# Patient Record
Sex: MALE | Race: WHITE | Employment: FULL TIME | URBAN - METROPOLITAN AREA
[De-identification: names, ages, dates, MRNs, and addresses within clinical notes are randomized per-mention and may not be internally consistent; named-entity substitution may affect disease eponyms.]

---

## 2017-11-11 ENCOUNTER — APPOINTMENT (OUTPATIENT)
Dept: CT IMAGING | Facility: CLINIC | Age: 62
DRG: 515 | End: 2017-11-11
Attending: STUDENT IN AN ORGANIZED HEALTH CARE EDUCATION/TRAINING PROGRAM
Payer: COMMERCIAL

## 2017-11-11 ENCOUNTER — HOSPITAL ENCOUNTER (INPATIENT)
Facility: CLINIC | Age: 62
LOS: 11 days | Discharge: HOME | DRG: 515 | End: 2017-11-22
Attending: NEUROLOGICAL SURGERY | Admitting: NEUROLOGICAL SURGERY
Payer: COMMERCIAL

## 2017-11-11 DIAGNOSIS — D49.6 BRAIN TUMOR (H): Primary | ICD-10-CM

## 2017-11-11 DIAGNOSIS — G93.9 BRAIN LESION: ICD-10-CM

## 2017-11-11 DIAGNOSIS — I10 HYPERTENSION, UNSPECIFIED TYPE: ICD-10-CM

## 2017-11-11 LAB
ABO + RH BLD: NORMAL
ABO + RH BLD: NORMAL
ANION GAP SERPL CALCULATED.3IONS-SCNC: 10 MMOL/L (ref 3–14)
APTT PPP: 23 SEC (ref 22–37)
BLD GP AB SCN SERPL QL: NORMAL
BLOOD BANK CMNT PATIENT-IMP: NORMAL
BUN SERPL-MCNC: 57 MG/DL (ref 7–30)
CALCIUM SERPL-MCNC: 7.9 MG/DL (ref 8.5–10.1)
CHLORIDE SERPL-SCNC: 113 MMOL/L (ref 94–109)
CO2 SERPL-SCNC: 17 MMOL/L (ref 20–32)
CREAT SERPL-MCNC: 2.33 MG/DL (ref 0.66–1.25)
ERYTHROCYTE [DISTWIDTH] IN BLOOD BY AUTOMATED COUNT: 12.6 % (ref 10–15)
GFR SERPL CREATININE-BSD FRML MDRD: 29 ML/MIN/1.7M2
GLUCOSE BLDC GLUCOMTR-MCNC: 210 MG/DL (ref 70–99)
GLUCOSE SERPL-MCNC: 234 MG/DL (ref 70–99)
HCT VFR BLD AUTO: 38.4 % (ref 40–53)
HGB BLD-MCNC: 12.8 G/DL (ref 13.3–17.7)
INR PPP: 1.03 (ref 0.86–1.14)
MCH RBC QN AUTO: 30.3 PG (ref 26.5–33)
MCHC RBC AUTO-ENTMCNC: 33.3 G/DL (ref 31.5–36.5)
MCV RBC AUTO: 91 FL (ref 78–100)
MRSA DNA SPEC QL NAA+PROBE: NEGATIVE
PLATELET # BLD AUTO: 245 10E9/L (ref 150–450)
POTASSIUM SERPL-SCNC: 4.4 MMOL/L (ref 3.4–5.3)
RADIOLOGIST FLAGS: ABNORMAL
RBC # BLD AUTO: 4.22 10E12/L (ref 4.4–5.9)
SODIUM SERPL-SCNC: 141 MMOL/L (ref 133–144)
SPECIMEN EXP DATE BLD: NORMAL
SPECIMEN SOURCE: NORMAL
WBC # BLD AUTO: 17.9 10E9/L (ref 4–11)

## 2017-11-11 PROCEDURE — 74176 CT ABD & PELVIS W/O CONTRAST: CPT

## 2017-11-11 PROCEDURE — 25000128 H RX IP 250 OP 636: Performed by: STUDENT IN AN ORGANIZED HEALTH CARE EDUCATION/TRAINING PROGRAM

## 2017-11-11 PROCEDURE — 85610 PROTHROMBIN TIME: CPT | Performed by: STUDENT IN AN ORGANIZED HEALTH CARE EDUCATION/TRAINING PROGRAM

## 2017-11-11 PROCEDURE — 20000004 ZZH R&B ICU UMMC

## 2017-11-11 PROCEDURE — 00000146 ZZHCL STATISTIC GLUCOSE BY METER IP

## 2017-11-11 PROCEDURE — 86900 BLOOD TYPING SEROLOGIC ABO: CPT | Performed by: STUDENT IN AN ORGANIZED HEALTH CARE EDUCATION/TRAINING PROGRAM

## 2017-11-11 PROCEDURE — 25000125 ZZHC RX 250: Performed by: STUDENT IN AN ORGANIZED HEALTH CARE EDUCATION/TRAINING PROGRAM

## 2017-11-11 PROCEDURE — 40000141 ZZH STATISTIC PERIPHERAL IV START W/O US GUIDANCE

## 2017-11-11 PROCEDURE — 87641 MR-STAPH DNA AMP PROBE: CPT | Performed by: INTERNAL MEDICINE

## 2017-11-11 PROCEDURE — 70450 CT HEAD/BRAIN W/O DYE: CPT

## 2017-11-11 PROCEDURE — 86901 BLOOD TYPING SEROLOGIC RH(D): CPT | Performed by: STUDENT IN AN ORGANIZED HEALTH CARE EDUCATION/TRAINING PROGRAM

## 2017-11-11 PROCEDURE — 36415 COLL VENOUS BLD VENIPUNCTURE: CPT | Performed by: STUDENT IN AN ORGANIZED HEALTH CARE EDUCATION/TRAINING PROGRAM

## 2017-11-11 PROCEDURE — 80048 BASIC METABOLIC PNL TOTAL CA: CPT | Performed by: STUDENT IN AN ORGANIZED HEALTH CARE EDUCATION/TRAINING PROGRAM

## 2017-11-11 PROCEDURE — 86850 RBC ANTIBODY SCREEN: CPT | Performed by: STUDENT IN AN ORGANIZED HEALTH CARE EDUCATION/TRAINING PROGRAM

## 2017-11-11 PROCEDURE — 87640 STAPH A DNA AMP PROBE: CPT | Performed by: INTERNAL MEDICINE

## 2017-11-11 PROCEDURE — 85027 COMPLETE CBC AUTOMATED: CPT | Performed by: STUDENT IN AN ORGANIZED HEALTH CARE EDUCATION/TRAINING PROGRAM

## 2017-11-11 PROCEDURE — 85730 THROMBOPLASTIN TIME PARTIAL: CPT | Performed by: STUDENT IN AN ORGANIZED HEALTH CARE EDUCATION/TRAINING PROGRAM

## 2017-11-11 RX ORDER — HYDRALAZINE HYDROCHLORIDE 20 MG/ML
5-10 INJECTION INTRAMUSCULAR; INTRAVENOUS EVERY 30 MIN PRN
Status: DISCONTINUED | OUTPATIENT
Start: 2017-11-11 | End: 2017-11-12

## 2017-11-11 RX ORDER — LABETALOL HYDROCHLORIDE 5 MG/ML
10-20 INJECTION, SOLUTION INTRAVENOUS EVERY 30 MIN PRN
Status: DISCONTINUED | OUTPATIENT
Start: 2017-11-11 | End: 2017-11-13

## 2017-11-11 RX ORDER — SODIUM CHLORIDE 9 MG/ML
INJECTION, SOLUTION INTRAVENOUS CONTINUOUS
Status: DISCONTINUED | OUTPATIENT
Start: 2017-11-11 | End: 2017-11-13

## 2017-11-11 RX ORDER — LIDOCAINE 40 MG/G
CREAM TOPICAL
Status: DISCONTINUED | OUTPATIENT
Start: 2017-11-11 | End: 2017-11-22 | Stop reason: HOSPADM

## 2017-11-11 RX ADMIN — Medication 10 MG/HR: at 16:01

## 2017-11-11 RX ADMIN — Medication 14 MG/HR: at 20:08

## 2017-11-11 RX ADMIN — HYDRALAZINE HYDROCHLORIDE 10 MG: 20 INJECTION INTRAMUSCULAR; INTRAVENOUS at 23:43

## 2017-11-11 RX ADMIN — SODIUM CHLORIDE: 9 INJECTION, SOLUTION INTRAVENOUS at 13:24

## 2017-11-11 RX ADMIN — Medication 15 MG/HR: at 23:32

## 2017-11-11 RX ADMIN — NICARDIPINE HYDROCHLORIDE 2.5 MG/HR: 25 INJECTION INTRAVENOUS at 11:44

## 2017-11-11 ASSESSMENT — ACTIVITIES OF DAILY LIVING (ADL)
FALL_HISTORY_WITHIN_LAST_SIX_MONTHS: YES
RETIRED_EATING: 0-->INDEPENDENT
AMBULATION: 0-->INDEPENDENT
COGNITION: 0 - NO COGNITION ISSUES REPORTED
DRESS: 0-->INDEPENDENT
SWALLOWING: 0-->SWALLOWS FOODS/LIQUIDS WITHOUT DIFFICULTY
TRANSFERRING: 0-->INDEPENDENT
BATHING: 0-->INDEPENDENT
RETIRED_COMMUNICATION: 0-->UNDERSTANDS/COMMUNICATES WITHOUT DIFFICULTY
NUMBER_OF_TIMES_PATIENT_HAS_FALLEN_WITHIN_LAST_SIX_MONTHS: 1
TOILETING: 0-->INDEPENDENT

## 2017-11-11 ASSESSMENT — VISUAL ACUITY
OU: NORMAL ACUITY

## 2017-11-11 NOTE — IP AVS SNAPSHOT
[address] [address]    Phone:  [phone]                                       After Visit Summary   11/11/2017    Boy Aguirre    MRN: [id number]           After Visit Summary Signature Page     I have received my discharge instructions, and my questions have been answered. I have discussed any challenges I see with this plan with the nurse or doctor.    ..........................................................................................................................................  Patient/Patient Representative Signature      ..........................................................................................................................................  Patient Representative Print Name and Relationship to Patient    ..................................................               ................................................  Date                                            Time    ..........................................................................................................................................  Reviewed by Signature/Title    ...................................................              ..............................................  Date                                                            Time

## 2017-11-11 NOTE — IP AVS SNAPSHOT
MRN:[id number]                      After Visit Summary   11/11/2017    Boy Aguirre    MRN: [id number]           Thank you!     Thank you for choosing [facility] for your care. Our goal is always to provide you with excellent care. Hearing back from our patients is one way we can continue to improve our services. Please take a few minutes to complete the written survey that you may receive in the mail after you visit with us. Thank you!        Patient Information     Date Of Birth          1955        Designated Caregiver       Most Recent Value    Caregiver    Will someone help with your care after discharge? yes    Name of designated caregiver Sheri (daughter)    Phone number of caregiver [phone]    Caregiver address [address]  [place] [id number]      About your hospital stay     You were admitted on:  November 11, 2017 You last received care in the:  [address] [facility]    You were discharged on:  November 22, 2017        Reason for your hospital stay       S/P right frontal brain biopsy                  Who to Call     For medical emergencies, please call 911.  For non-urgent questions about your medical care, please call your primary care provider or clinic, None  For questions related to your surgery, please call your surgery clinic        Attending Provider     Provider Specialty    Dami Lafleur MD Neurosurgery    [facility], Alexandr Sims MD Neurosurgery       Primary Care Provider Fax #    Provider Not In System [phone]      After Care Instructions     Activity       Your activity upon discharge: {  Do not do any bending, twisting, strenuous exercise, or heavy lifting (greater than 10 pounds) for 4-6 weeks. Be careful and ask for assistance when walking or going up and down stairs. Avoid any activities that could result in trauma to the surgical wound. Do not drive within 3 months of having your last seizure or while using narcotics or other sedating  medications, such as sleep aids, muscle relaxants, etc.            Diet       Follow this diet upon discharge: Orders Placed This Encounter      Regular Diet Adult            Discharge Instructions       You underwent surgery to have a brain tumor removed by Dami Lafleur MD   - If you have not received the results of the pathology (diagnosis) at the time of discharge, our office will call you with these results as soon as they become available, or we will discuss them with you during your clinic visit, whichever is first.     - If you are on a steroid medication (decadron or dexamethasone) please follow the detailed instructions with the prescription to slowly decrease the amount you are taking.         - You will have follow up scheduled with the physician assistants and/or nurse practitioners in our clinic 2 weeks after your surgery.  If you live far away, you may see your primary care doctor for a wound check at 2 weeks.     - If you have not heard from our clinic about your follow up visit by 3-4 days following your discharge, please call our clinic at [phone] to schedule an appointment with the Neurosurgery teams.     After discharge, your activity restrictions are:   -We encourage short frequent walks, increasing as tolerated.  - No driving until you are seen in clinic and cleared by your neurosurgeon.  If you have had a seizure, you may not drive for at least 3 months according to [place] law.    - No strenuous activity.  - No lifting more than 10 pounds until you are seen in clinic (a gallon of milk weighs approximately 8 pounds)    Wound cares after surgery  - You are ok to shower, but do not soak your incisions. Pat them dry if they get damp.   - Avoid coloring your hair or permanent styling until cleared by your surgeon  - No baths, hot tubs or pools for 4-6 weeks after surgery.   - No strenuous activity.  - No lifting more than 10 pounds until you are seen in clinic (a gallon of milk weighs  approximately 8 pounds)  - You are ok to shower, but do not soak your incisions. Pat them dry if they get damp.   - Avoid coloring your hair or permanent styling until cleared by your surgeon  - No baths, hot tubs or pools for 4-6 weeks after surgery.       Call if you have any of the followin. Temperature greater than 101.5 F.   2. Any redness, swelling or discharge from the wound.   3. Any new weakness, numbness or altered mental status.  4. Worsening pain that is not improving with the pain medications you were prescribed.     Call [phone] or after 5:00 pm or on weekends call [phone] and ask for the neurosurgery resident on call. Thank You.            Wound care and dressings       Instructions to care for your wound at home:   You should remove your dressings and bandages on post-operative day #2. You should then keep the wound undressed and open to air. You are allowed to take showers and get the wound wet starting on post-operative day #3 but you may not scrub or soak the wound or keep it submerged under water. If you do happen to get the wound wet, be sure to pat dry it rather than scrubbing it with a towel.                  Follow-up Appointments     Adult [facility] Follow-up and recommended labs and tests       Follow up with Dr Lafleur in Neurosurgery clinic in one month with MRI brain w/wo contrast    Follow up with Dr Jon of Neurology Stroke in one month    Follow up with your primary care provider within 7 days for blood pressure recheck and hospital follow up.  You will need labs drawn at that time as well in order to evaluate your renal function.     Follow up with Primary care provider in 2 weeks for wound check and suture removal    Appointments on [place] and/or [facility] (with [facility] provider or service). Call [phone] if you haven't heard regarding these appointments within 7 days of discharge.                  Additional Services     Neurology Adult Referral     "   Please schedule patient with Dr Jon in Neurology stroke, please )attempt to coordinate with visit with Dr Lafleur in one month (approx 12//                  Future tests that were ordered for you     MRI Brain w & w/o contrast                 Pending Results     Date and Time Order Name Status Description    11/21/2017 0607 Paraneoplastic antibody In process     11/19/2017 0917 Surgical pathology exam In process     11/15/2017 0005 US Renal Complete w Duplex Complete Preliminary     11/13/2017 1215 Anaerobic CSF culture Preliminary     11/13/2017 1119 Freeze Sample CSF Tube 3: Laboratory Miscellaneous Order In process     11/13/2017 1047 Anti-MOG (send to [facility]): Laboratory Miscellaneous Order In process             Statement of Approval     Ordered          11/22/17 0626  I have reviewed and agree with all the recommendations and orders detailed in this document.  EFFECTIVE NOW     Approved and electronically signed by:  Kym Rosen APRN CNP             Admission Information     Date & Time Provider Department Dept. Phone    11/11/2017 Alexandr Hauser MD [address] [facility] [place] [phone]      Your Vitals Were     Blood Pressure Pulse Temperature Respirations Height Weight    136/78 (BP Location: Left arm) 65 97.9  F (36.6  C) (Oral) 16 1.765 m (5' 9.5\") 79 kg (174 lb 2.6 oz)    Pulse Oximetry BMI (Body Mass Index)                96% 25.35 kg/m2          [employer] Information     [employer] lets you send messages to your doctor, view your test results, renew your prescriptions, schedule appointments and more. To sign up, go to [email or web address] . Click on \"Log in\" on the left side of the screen, which will take you to the Welcome page. Then click on \"Sign up Now\" on the right side of the page.     You will be asked to enter the access code listed below, as well as some personal information. Please follow the directions to create your username and password.     Your access code is: " [email or web address]  Expires: 2/15/2018  4:51 PM     Your access code will  in 90 days. If you need help or a new code, please call your [facility] clinic or [phone].        Care EveryWhere ID     This is your Care EveryWhere ID. This could be used by other organizations to access your [facility] medical records  [id number]        Equal Access to Services     [facility] GRACIE : Hadii saeed ku hadasho Soomaali, waaxda luqadaha, qaybta kaalmada adeegyada, waxviktoriya reyes haylyricn parminder estrellagillian rivera . So [facility] [phone].    ATENCIÓN: Si habla espdave, tiene a herman disposición servicios gratuitos de asistencia lingüística. Bertram al [phone].    We comply with applicable federal civil rights laws and [place] laws. We do not discriminate on the basis of race, color, national origin, age, disability, sex, sexual orientation, or gender identity.               Review of your medicines      START taking        Dose / Directions    aMILoride 5 MG tablet   Commonly known as:  MIDAMOR   Used for:  Hypertension, unspecified type        Dose:  10 mg   Take 2 tablets (10 mg) by mouth daily   Quantity:  30 tablet   Refills:  1       carvedilol 25 MG tablet   Commonly known as:  COREG   Used for:  Hypertension, unspecified type        Dose:  25 mg   Take 1 tablet (25 mg) by mouth 2 times daily (with meals)   Quantity:  60 tablet   Refills:  1       NIFEdipine ER 60 MG 24 hr tablet   Commonly known as:  ADALAT CC   Used for:  Hypertension, unspecified type        Dose:  60 mg   Take 1 tablet (60 mg) by mouth 2 times daily   Quantity:  60 tablet   Refills:  1       oxyCODONE IR 5 MG tablet   Commonly known as:  ROXICODONE        Dose:  5 mg   Take 1 tablet (5 mg) by mouth every 4 hours as needed for moderate to severe pain   Quantity:  30 tablet   Refills:  0       senna-docusate 8.6-50 MG per tablet   Commonly known as:  SENOKOT-S;PERICOLACE        Dose:  3 tablet   3 tablets by Oral or Feeding Tube route 2 times daily   Quantity:  30  tablet   Refills:  0            Where to get your medicines      These medications were sent to [facility] [place] - [address]  [address] [place] [address]     Phone:  [phone]     aMILoride 5 MG tablet    carvedilol 25 MG tablet    NIFEdipine ER 60 MG 24 hr tablet    senna-docusate 8.6-50 MG per tablet         Some of these will need a paper prescription and others can be bought over the counter. Ask your nurse if you have questions.     Bring a paper prescription for each of these medications     oxyCODONE IR 5 MG tablet                Protect others around you: Learn how to safely use, store and throw away your medicines at www.disposemymeds.org.             Medication List: This is a list of all your medications and when to take them. Check marks below indicate your daily home schedule. Keep this list as a reference.      Medications           Morning Afternoon Evening Bedtime As Needed    aMILoride 5 MG tablet   Commonly known as:  MIDAMOR   Take 2 tablets (10 mg) by mouth daily   Last time this was given:  10 mg on 11/21/2017  8:51 AM                                carvedilol 25 MG tablet   Commonly known as:  COREG   Take 1 tablet (25 mg) by mouth 2 times daily (with meals)   Last time this was given:  25 mg on 11/21/2017  6:27 PM                                NIFEdipine ER 60 MG 24 hr tablet   Commonly known as:  ADALAT CC   Take 1 tablet (60 mg) by mouth 2 times daily   Last time this was given:  60 mg on 11/21/2017  8:47 PM                                oxyCODONE IR 5 MG tablet   Commonly known as:  ROXICODONE   Take 1 tablet (5 mg) by mouth every 4 hours as needed for moderate to severe pain                                senna-docusate 8.6-50 MG per tablet   Commonly known as:  SENOKOT-S;PERICOLACE   3 tablets by Oral or Feeding Tube route 2 times daily   Last time this was given:  3 tablets on 11/16/2017  7:43 AM

## 2017-11-11 NOTE — PROGRESS NOTES
"SPIRITUAL HEALTH SERVICES  SPIRITUAL ASSESSMENT Progress Note  [facility]A     REFERRAL SOURCE: Hospital  Request    Met with Boy to offer spiritual and emotional support. Boy said \"i'm very tired right now maybe tomorrow.\"     PLAN: I will make the on call  for 11/12 aware of this request     Margo Chaudhary  Chaplain Resident  Pager [phone]    "

## 2017-11-12 LAB
ALBUMIN UR-MCNC: NEGATIVE MG/DL
ANION GAP SERPL CALCULATED.3IONS-SCNC: 11 MMOL/L (ref 3–14)
ANION GAP SERPL CALCULATED.3IONS-SCNC: 13 MMOL/L (ref 3–14)
APPEARANCE UR: CLEAR
BILIRUB UR QL STRIP: NEGATIVE
BUN SERPL-MCNC: 73 MG/DL (ref 7–30)
BUN SERPL-MCNC: 73 MG/DL (ref 7–30)
CALCIUM SERPL-MCNC: 8.1 MG/DL (ref 8.5–10.1)
CALCIUM SERPL-MCNC: 8.1 MG/DL (ref 8.5–10.1)
CHLORIDE SERPL-SCNC: 111 MMOL/L (ref 94–109)
CHLORIDE SERPL-SCNC: 112 MMOL/L (ref 94–109)
CO2 SERPL-SCNC: 13 MMOL/L (ref 20–32)
CO2 SERPL-SCNC: 16 MMOL/L (ref 20–32)
COLOR UR AUTO: ABNORMAL
CREAT SERPL-MCNC: 2.39 MG/DL (ref 0.66–1.25)
CREAT SERPL-MCNC: 2.54 MG/DL (ref 0.66–1.25)
CREAT UR-MCNC: 90 MG/DL
CRP SERPL-MCNC: <2.9 MG/L (ref 0–8)
ERYTHROCYTE [DISTWIDTH] IN BLOOD BY AUTOMATED COUNT: 12.9 % (ref 10–15)
ERYTHROCYTE [SEDIMENTATION RATE] IN BLOOD BY WESTERGREN METHOD: 15 MM/H (ref 0–20)
FRACT EXCRET NA UR+SERPL-RTO: 0.8 %
GFR SERPL CREATININE-BSD FRML MDRD: 26 ML/MIN/1.7M2
GFR SERPL CREATININE-BSD FRML MDRD: 28 ML/MIN/1.7M2
GLUCOSE BLDC GLUCOMTR-MCNC: 189 MG/DL (ref 70–99)
GLUCOSE SERPL-MCNC: 213 MG/DL (ref 70–99)
GLUCOSE SERPL-MCNC: 217 MG/DL (ref 70–99)
GLUCOSE UR STRIP-MCNC: 300 MG/DL
HCT VFR BLD AUTO: 37.1 % (ref 40–53)
HGB BLD-MCNC: 11.8 G/DL (ref 13.3–17.7)
HGB UR QL STRIP: NEGATIVE
KETONES UR STRIP-MCNC: NEGATIVE MG/DL
LACTATE BLD-SCNC: 2 MMOL/L (ref 0.7–2)
LEUKOCYTE ESTERASE UR QL STRIP: NEGATIVE
MCH RBC QN AUTO: 29.8 PG (ref 26.5–33)
MCHC RBC AUTO-ENTMCNC: 31.8 G/DL (ref 31.5–36.5)
MCV RBC AUTO: 94 FL (ref 78–100)
MUCOUS THREADS #/AREA URNS LPF: PRESENT /LPF
NITRATE UR QL: NEGATIVE
PH UR STRIP: 5 PH (ref 5–7)
PLATELET # BLD AUTO: 225 10E9/L (ref 150–450)
POTASSIUM SERPL-SCNC: 4.2 MMOL/L (ref 3.4–5.3)
POTASSIUM SERPL-SCNC: 4.4 MMOL/L (ref 3.4–5.3)
PROCALCITONIN SERPL-MCNC: 0.1 NG/ML
RBC # BLD AUTO: 3.96 10E12/L (ref 4.4–5.9)
RBC #/AREA URNS AUTO: 0 /HPF (ref 0–2)
SODIUM SERPL-SCNC: 138 MMOL/L (ref 133–144)
SODIUM SERPL-SCNC: 138 MMOL/L (ref 133–144)
SODIUM UR-SCNC: 37 MMOL/L
SOURCE: ABNORMAL
SP GR UR STRIP: 1.01 (ref 1–1.03)
UROBILINOGEN UR STRIP-MCNC: NORMAL MG/DL (ref 0–2)
WBC # BLD AUTO: 15.3 10E9/L (ref 4–11)
WBC #/AREA URNS AUTO: 1 /HPF (ref 0–2)

## 2017-11-12 PROCEDURE — 80048 BASIC METABOLIC PNL TOTAL CA: CPT | Performed by: STUDENT IN AN ORGANIZED HEALTH CARE EDUCATION/TRAINING PROGRAM

## 2017-11-12 PROCEDURE — 85027 COMPLETE CBC AUTOMATED: CPT | Performed by: STUDENT IN AN ORGANIZED HEALTH CARE EDUCATION/TRAINING PROGRAM

## 2017-11-12 PROCEDURE — 12000008 ZZH R&B INTERMEDIATE UMMC

## 2017-11-12 PROCEDURE — 81001 URINALYSIS AUTO W/SCOPE: CPT | Performed by: STUDENT IN AN ORGANIZED HEALTH CARE EDUCATION/TRAINING PROGRAM

## 2017-11-12 PROCEDURE — 86140 C-REACTIVE PROTEIN: CPT | Performed by: STUDENT IN AN ORGANIZED HEALTH CARE EDUCATION/TRAINING PROGRAM

## 2017-11-12 PROCEDURE — 25000128 H RX IP 250 OP 636: Performed by: STUDENT IN AN ORGANIZED HEALTH CARE EDUCATION/TRAINING PROGRAM

## 2017-11-12 PROCEDURE — 25000132 ZZH RX MED GY IP 250 OP 250 PS 637: Performed by: STUDENT IN AN ORGANIZED HEALTH CARE EDUCATION/TRAINING PROGRAM

## 2017-11-12 PROCEDURE — 84300 ASSAY OF URINE SODIUM: CPT | Performed by: STUDENT IN AN ORGANIZED HEALTH CARE EDUCATION/TRAINING PROGRAM

## 2017-11-12 PROCEDURE — 36415 COLL VENOUS BLD VENIPUNCTURE: CPT | Performed by: NEUROLOGICAL SURGERY

## 2017-11-12 PROCEDURE — 83605 ASSAY OF LACTIC ACID: CPT | Performed by: NEUROLOGICAL SURGERY

## 2017-11-12 PROCEDURE — 85652 RBC SED RATE AUTOMATED: CPT | Performed by: STUDENT IN AN ORGANIZED HEALTH CARE EDUCATION/TRAINING PROGRAM

## 2017-11-12 PROCEDURE — 36415 COLL VENOUS BLD VENIPUNCTURE: CPT | Performed by: STUDENT IN AN ORGANIZED HEALTH CARE EDUCATION/TRAINING PROGRAM

## 2017-11-12 PROCEDURE — 84145 PROCALCITONIN (PCT): CPT | Performed by: STUDENT IN AN ORGANIZED HEALTH CARE EDUCATION/TRAINING PROGRAM

## 2017-11-12 PROCEDURE — 83036 HEMOGLOBIN GLYCOSYLATED A1C: CPT | Performed by: STUDENT IN AN ORGANIZED HEALTH CARE EDUCATION/TRAINING PROGRAM

## 2017-11-12 PROCEDURE — 25000125 ZZHC RX 250: Performed by: STUDENT IN AN ORGANIZED HEALTH CARE EDUCATION/TRAINING PROGRAM

## 2017-11-12 PROCEDURE — 00000146 ZZHCL STATISTIC GLUCOSE BY METER IP

## 2017-11-12 PROCEDURE — 82570 ASSAY OF URINE CREATININE: CPT | Performed by: STUDENT IN AN ORGANIZED HEALTH CARE EDUCATION/TRAINING PROGRAM

## 2017-11-12 RX ORDER — AMOXICILLIN 250 MG
1 CAPSULE ORAL AT BEDTIME
Status: DISCONTINUED | OUTPATIENT
Start: 2017-11-12 | End: 2017-11-13

## 2017-11-12 RX ADMIN — SODIUM CHLORIDE 500 ML: 9 INJECTION, SOLUTION INTRAVENOUS at 12:56

## 2017-11-12 RX ADMIN — Medication 15 MG/HR: at 01:59

## 2017-11-12 RX ADMIN — Medication 10 MG: at 00:56

## 2017-11-12 RX ADMIN — Medication 20 MG: at 04:38

## 2017-11-12 RX ADMIN — SODIUM CHLORIDE: 9 INJECTION, SOLUTION INTRAVENOUS at 14:40

## 2017-11-12 RX ADMIN — Medication 15 MG/HR: at 03:54

## 2017-11-12 RX ADMIN — Medication 15 MG/HR: at 06:45

## 2017-11-12 RX ADMIN — SODIUM CHLORIDE: 9 INJECTION, SOLUTION INTRAVENOUS at 00:46

## 2017-11-12 RX ADMIN — SENNOSIDES AND DOCUSATE SODIUM 1 TABLET: 8.6; 5 TABLET ORAL at 22:33

## 2017-11-12 RX ADMIN — Medication 20 MG: at 02:05

## 2017-11-12 RX ADMIN — Medication 20 MG: at 03:50

## 2017-11-12 ASSESSMENT — VISUAL ACUITY
OU: NORMAL ACUITY

## 2017-11-12 NOTE — PROGRESS NOTES
[facility]  NeuroICU Daily ICU Note:          Assessment   Boy Aguirre is a 62 year old male who presented with Lt sided weakness LL>UL on 11/6 with headache, MRI Brain shows Rt sided Mass enhancing lesion with Lt hemispheric 2 small enhancing lesions. Went to outside hospital, has worsened headache & vomiting with new suspected bleed on CT head that is why he was transferred to us.          Subjective (24 hours events)     No acute events overnight.           Plan 1.   Neuro:    Continue frequent neuro exams while in ICU. Notify MD for acute changes in exam.    Pain control    Do CT chest abdomen & pelvis to exclude any primary mass    Repeat MRI head w/wo contrust  2. CVS: hemodynamically stable    Maintain SBP < 140    Continue nicardine drip    Hydralazine and labetolol PRN    Continuous cardiac monitoring while in ICU  3. Pulmonary: no issues    Continuous pulse oximetry    Supplemental oxygen PRN    Incentive spirometry Q1H while awake    Daily chest X ray as needed   4. GI:     Advance diet as tolerated to regular    Bowel regimen. PRN anti-emetics.    Protonix for ulcer ppx  5. Renal: CKD    mIVF -- TKO when patient is tolerating good PO intake    Electrolyte replacement protocol    Continue to monitor intake/output    Daily BMP  6. ID: afebrile, normal WBC count    Continue to monitor for fevers and/or signs of infection  7. Endocrine:    Continue glucose checks  8. Heme: no issues    Platelets > 100,000    INR < 1.5    Hemoglobin > 7    Daily CBC  9. Prophylaxis    DVT: SCDs while in bed    GI: Protonix  10. Disposition: Surgical ICU    PT/OT    Above patient and plan has been discussed with the neuro ICU Attending.                Objective   Temp:  [98.4  F (36.9  C)-98.8  F (37.1  C)] 98.8  F (37.1  C)  Pulse:  [94] 94  Heart Rate:  [] 93  Resp:  [14-22] 20  BP: (134-161)/() 147/79  SpO2:  [90 %-93 %] 92 %    No Data Recorded    Resp: 20    I/O last 3 completed  shifts:  In: 175 [I.V.:175]  Out: 300 [Urine:300]    Physical Exam  General: NAD, lying comfortably in bed  Incision: clean, dry, dressing intact  Neurologic    Mental Status:       -- Awake; Alert; oriented x 3      -- Follows commands       -- Speech fluent, spontaneous. No aphasia or dysarthria.      -- no gaze preference. No apparent hemineglect.    Cranial Nerves:      -- visual fields full to confrontation, PERRL 3-2mm bilat and brisk      -- extraocular movements intact      -- face symmetrical, tongue midline      -- sensory V1-V3 intact bilaterally      -- palate elevates symmetrically, uvula midline      -- hearing grossly intact bilat    Motor:    Delt Bi Tri WE WF    R 5 5 5 5 5 5   L 4 4 4 4 4 5    IP Quad Ham DF PF EHL   R 5 5 5 5 5 5   L 4 4 4 5 5 5     Sensory: symmetrically intact to light touch x4 extremities.     Reflexes: 2+ bilaterally and symmetric in biceps, triceps, brachioradialis, and patellae; no ankle clonus bilaterally; negative Babinski bilaterally; negative Yo's bilaterally      Labs and Imaging   BMP  Recent Labs  Lab 11/11/17  1313      POTASSIUM 4.4   CHLORIDE 113*   CO2 17*   BUN 57*   CR 2.33*   ALICIA 7.9*       CBC  Recent Labs  Lab 11/11/17  1313   WBC 17.9*   HGB 12.8*          COAGS  Recent Labs  Lab 11/11/17  1313   INR 1.03   PTT 23        ABGNo results for input(s): PH, PCO2, PO2, HCO3 in the last 168 hours.    CRP/ESRNo results for input(s): CRP in the last 168 hours.    Invalid input(s): ESR    CSFNo results for input(s): CGLU, CTP in the last 168 hours.    Invalid input(s): CCSF    MICRONo results for input(s): CULT in the last 168 hours.    IMAGING:   CT head 11/11: Small hyperdense focus in the periphery of the high right  frontal lobe may represent intraparenchymal hemorrhage. Confluent  edema in the high right frontal lobe.    CT chest Abdomen & pelvis 11/11: report is pending

## 2017-11-12 NOTE — H&P
"[facility]    History and Physical    HPI:  Mr. Aguirre is a 62 year old man who presented to OSH complaining of left-sided weakness s/p fall at home on 11/6, with a HA that developed later and elevated SBP in 220s.  Went to OSH with worsening HA & vomiting. Placed on nicardipine at [place], transferred due to repeat CT head concerning for suspected bleed along posterior superior right frontal lobe. Weaker on the left side (LLE>LUE), no other weakness. States this has been worsening the past couple of weeks.     PAST MEDICAL HISTORY: No past medical history on file.    PAST SURGICAL HISTORY: No past surgical history on file.    FAMILY HISTORY: No family history on file.    SOCIAL HISTORY:   Social History   Substance Use Topics     Smoking status: Not on file     Smokeless tobacco: Not on file     Alcohol use Not on file       MEDICATIONS:  No current outpatient prescriptions on file.         ROS: 10 point ROS of systems including Constitutional, Eyes, Respiratory, Cardiovascular, Gastroenterology, Genitourinary, Integumentary, Muscularskeletal, Psychiatric were all negative except for pertinent positives noted in my HPI.    PE:  Blood pressure 121/69, pulse 94, temperature 97.9  F (36.6  C), temperature source Oral, resp. rate 19, height 1.765 m (5' 9.5\"), weight 92.5 kg (203 lb 14.8 oz), SpO2 95 %.  CV RRR, no m/r/g  PULM CTAB, no wheezes or rubs  ABD NT/ND, BS + throughout   NEUROLOGIC    Awake, alert, oriented x3, Following commands, NAD  Cranial Nerves: PERRL b/l, EOMI b/l, facial sensation intact to light touch throught, face symmetric, hearing intact to finger rub bilaterally, tongue protrusion midline, uvula midline, sternocleidomastoid/trapezius strength 5/5 bilaterally  Gross Motor Examination: HUGGINS with 5/5 strength EXCEPT 4/5 LUE weakness (except 5/5 handgrip) and 4/5 LLE (except 5/5 DF/PF/EHL), no pronator drift, no finger-to-nose dysmetria.  Sensory " Examination: intact to light touch throught  Reflexes 2/4, Toe signs were downgoing.       LABS/IMAGING:   Most Recent 3 CBC's:  Recent Labs   Lab Test  11/11/17   1313   WBC  17.9*   HGB  12.8*   MCV  91   PLT  245       Most Recent 3 BMP's:  Recent Labs   Lab Test  11/12/17   0740  11/11/17   1313   NA  138  141   POTASSIUM  4.4  4.4   CHLORIDE  112*  113*   CO2  13*  17*   BUN  73*  57*   CR  2.54*  2.33*   ANIONGAP  13  10   ALCIIA  8.1*  7.9*   GLC  217*  234*       CT Chest Abdomen Pelvis w/o Contrast   Final Result   Impression:    1. Bilateral apical predominant perilymphatic distribution pulmonary   nodules and septal thickening can be seen in lymphangitic   carcinomatosis. Mixed groundglass and nodular opacities in the lung   bases and likely infectious or inflammatory in etiology and follow-up   to resolution is recommended with low-dose chest CT in 3 months.   2. Smooth interlobular septal thickening and scattered groundglass   opacities likely representing pulmonary edema.   3. Small bilateral pleural effusions and adjacent basilar atelectasis   and/or consolidation.   4. Small volume ascites of unclear etiology in this noncontrast   examination although may relate to volume overload given soft tissue   anasarca and pulmonary edema.      I have personally reviewed the examination and initial interpretation   and I agree with the findings.      GEMINI SILVA MD      CT Head w/o Contrast   Final Result   Abnormal   Impression: Small hyperdense focus in the periphery of the high right   frontal lobe may represent intraparenchymal hemorrhage. Confluent   edema in the high right frontal lobe. Comparison with previous imaging   studies would be likely useful, if available.      [Urgent Result: Possible intraparenchymal hemorrhage.]      Finding was identified on 11/11/2017 2:36 PM.       Dr. Fallon was contacted by Dr. Alexandr Caldwell at 11/11/2017 3:03 PM   and verbalized understanding of the urgent finding.        I have personally reviewed the examination and initial interpretation   and I agree with the findings.      DEEPA MCKOY MD      MR Brain w/o & w Contrast    (Results Pending)       ASSESSMENT:  62M with left sided weakness and HA, presenting to OSH with elevated SBP in 220s and imaging demonstrating an area of edema and contrast enhancement in right frontal lobe concerning for mass. CT CAP did not show any definitive masses elsewhere in the body.     PLAN:  - Admission to neurosurgery   - Neuro checks q 2 hours  - MRI brain with and without contrast today   - Hold steroids   - Vitals q 2 hours  - In ICU for nicardipine drip in order to reach SBP goal < 140. Also has labetalol prn available.   - Regular diet   - Activity: as tolerated    Kaye Napoles MD   Neurosurgery PGY3

## 2017-11-12 NOTE — PLAN OF CARE
Problem: Patient Care Overview  Goal: Individualization & Mutuality  Outcome: No Change  D: Boy is alert and oriented x 4, pupils 3 -4 equal, brisk, tracks, no field cuts. HUGGINS strongly, all pulses normal, all warm, with good cap refill, denies numb/tingle. L UE slight drift continued throughout the night. Stood at bedside to void and marched in place. Nicardipine at 15 mg/hour with sBP persistantly >140. Given hydralazine, when Boy stood 40 min later he flushed and felt weak, hydralazine dc'd. Labetolol 10 mg and 20 mg given with SBP responding. LS clear, O2 added at 2 L NC and now increased to 4 L to maintain sats >90. BS +, large amount of flatus now at 0615. Voids spontaneously in good amounts clear yellow urine.   P: MRI scheduled this am, check list sent. Continue neuro checks, maintain safety. Assist Boy as able through this frustrating time.

## 2017-11-12 NOTE — PLAN OF CARE
Problem: Patient Care Overview  Goal: Plan of Care/Patient Progress Review  Pt arrived from 4A approx 1450. Pt identifies as female, pt prefers to be called she/her. Bedside neuro done with 4A nurse, no changes. Pt settled. MIVF at 125ml/hr. BMP to be drawn at 1700. MRI depending on BMP. MRI checklist sent by 4A nurse. Cont with POC.

## 2017-11-12 NOTE — PROGRESS NOTES
"SPIRITUAL HEALTH SERVICES  [facility] ([place]) 4A  ON-CALL VISIT     REFERRAL SOURCE: referred to pt by Saturday on-call  - routine request for  support upon admission.     Pt declined  visit today, said \"I'm eating now, and then I'll be watching the [employer] game\". Pt said he would prefer a visit tomorrow.     PLAN: refer to unit  for visit on Monday.     Anant Clarke) Shree West M.Div., [facility]  Staff   Pager [phone]                                                                                          "

## 2017-11-12 NOTE — PLAN OF CARE
Problem: Patient Care Overview  Goal: Plan of Care/Patient Progress Review  Outcome: Improving  Pt transferred to  from  around 14:30 via w/c accompanied by RN. Reason for transfer: Pt no longer needing ICU care. Nicardipine gtt weaned off and maintaining SBP <180 (new parameter per Dr. Ross)  VSS upon transferring. Pt voiced no c/o pain/nausea. Pt tolerated regular diet. MRI on hold til GFR is increased. MD aware. NS 500cc bolus given and MIV increased to 125cc/hr (NS)   BMP scheduled to be checked at 17:00 per LAB. Report given to 6A nurse. Pt's belongings sent. Pt stated he will contact his family members later of the transfer.

## 2017-11-12 NOTE — PLAN OF CARE
Problem: Patient Care Overview  Goal: Plan of Care/Patient Progress Review  Outcome: No Change  Pt admitted to  from OSH (from [facility]) around 11:30 for ICH per report. Pt was admitted to OSH on 11/7 with H/A, falling at home, and pt called 911. MRI showed vasogenic edema of post Right frontal area, per report. No seizures noted also. On 11/8, pt had increased H/A and Head CT showed new ICH. Pt then went to ICU there and started on Nicardipine gtt. Upon arrival to , pt alert and oriented x 4, on room air, and no c/o pain.  Pt continues on Nicardipine gtt to keep sbp <140. Neuro assessment normal except slight weakness on left arm.  Head/chest/abd/pelvis CT completed. Bedside swallow evaluation completed, and pt passed the swallow evaluation.  MD ordered regular diet. Continue to monitor BP and titrate nicardipine gtt as needed to keep sbp <140. See flow sheet for details.

## 2017-11-13 ENCOUNTER — APPOINTMENT (OUTPATIENT)
Dept: ULTRASOUND IMAGING | Facility: CLINIC | Age: 62
DRG: 515 | End: 2017-11-13
Attending: STUDENT IN AN ORGANIZED HEALTH CARE EDUCATION/TRAINING PROGRAM
Payer: COMMERCIAL

## 2017-11-13 LAB
ALBUMIN SERPL-MCNC: 2.8 G/DL (ref 3.4–5)
ANION GAP SERPL CALCULATED.3IONS-SCNC: 10 MMOL/L (ref 3–14)
APPEARANCE CSF: CLEAR
APPEARANCE CSF: CLEAR
BUN SERPL-MCNC: 59 MG/DL (ref 7–30)
CALCIUM SERPL-MCNC: 7.7 MG/DL (ref 8.5–10.1)
CEA SERPL-MCNC: <0.5 UG/L (ref 0–2.5)
CHLORIDE SERPL-SCNC: 114 MMOL/L (ref 94–109)
CO2 SERPL-SCNC: 18 MMOL/L (ref 20–32)
COLOR CSF: COLORLESS
COLOR CSF: COLORLESS
COPATH REPORT: NORMAL
CREAT SERPL-MCNC: 2.12 MG/DL (ref 0.66–1.25)
GFR SERPL CREATININE-BSD FRML MDRD: 32 ML/MIN/1.7M2
GLUCOSE CSF-MCNC: 93 MG/DL (ref 40–70)
GLUCOSE SERPL-MCNC: 158 MG/DL (ref 70–99)
GRAM STN SPEC: NORMAL
HBA1C MFR BLD: 6.7 % (ref 4.3–6)
HIV 1+2 AB+HIV1 P24 AG SERPL QL IA: NONREACTIVE
LACTATE BLD-SCNC: 2.3 MMOL/L (ref 0.7–2)
LYMPH ABN NFR CSF MANUAL: 37 %
MONOS+MACROS NFR CSF MANUAL: 58 %
NEUTROPHILS NFR CSF MANUAL: 5 %
NT-PROBNP SERPL-MCNC: 5950 PG/ML (ref 0–900)
POTASSIUM SERPL-SCNC: 4.4 MMOL/L (ref 3.4–5.3)
PROT CSF-MCNC: 50 MG/DL (ref 15–60)
PSA SERPL-MCNC: 1 UG/L (ref 0–4)
RBC # CSF MANUAL: 51 /UL (ref 0–2)
RBC # CSF MANUAL: 60 /UL (ref 0–2)
SODIUM SERPL-SCNC: 142 MMOL/L (ref 133–144)
SPECIMEN SOURCE: NORMAL
TUBE # CSF: 1 #
TUBE # CSF: 3 #
WBC # CSF MANUAL: 4 /UL (ref 0–5)
WBC # CSF MANUAL: 7 /UL (ref 0–5)

## 2017-11-13 PROCEDURE — 83916 OLIGOCLONAL BANDS: CPT | Performed by: STUDENT IN AN ORGANIZED HEALTH CARE EDUCATION/TRAINING PROGRAM

## 2017-11-13 PROCEDURE — 87075 CULTR BACTERIA EXCEPT BLOOD: CPT | Performed by: STUDENT IN AN ORGANIZED HEALTH CARE EDUCATION/TRAINING PROGRAM

## 2017-11-13 PROCEDURE — 87070 CULTURE OTHR SPECIMN AEROBIC: CPT | Performed by: STUDENT IN AN ORGANIZED HEALTH CARE EDUCATION/TRAINING PROGRAM

## 2017-11-13 PROCEDURE — 76770 US EXAM ABDO BACK WALL COMP: CPT

## 2017-11-13 PROCEDURE — 009U3ZX DRAINAGE OF SPINAL CANAL, PERCUTANEOUS APPROACH, DIAGNOSTIC: ICD-10-PCS | Performed by: NEUROLOGICAL SURGERY

## 2017-11-13 PROCEDURE — 25000132 ZZH RX MED GY IP 250 OP 250 PS 637: Performed by: NURSE PRACTITIONER

## 2017-11-13 PROCEDURE — 25000128 H RX IP 250 OP 636: Performed by: STUDENT IN AN ORGANIZED HEALTH CARE EDUCATION/TRAINING PROGRAM

## 2017-11-13 PROCEDURE — 80048 BASIC METABOLIC PNL TOTAL CA: CPT | Performed by: STUDENT IN AN ORGANIZED HEALTH CARE EDUCATION/TRAINING PROGRAM

## 2017-11-13 PROCEDURE — 25000132 ZZH RX MED GY IP 250 OP 250 PS 637: Performed by: STUDENT IN AN ORGANIZED HEALTH CARE EDUCATION/TRAINING PROGRAM

## 2017-11-13 PROCEDURE — 82945 GLUCOSE OTHER FLUID: CPT | Performed by: STUDENT IN AN ORGANIZED HEALTH CARE EDUCATION/TRAINING PROGRAM

## 2017-11-13 PROCEDURE — 87205 SMEAR GRAM STAIN: CPT | Performed by: STUDENT IN AN ORGANIZED HEALTH CARE EDUCATION/TRAINING PROGRAM

## 2017-11-13 PROCEDURE — 89050 BODY FLUID CELL COUNT: CPT | Performed by: INTERNAL MEDICINE

## 2017-11-13 PROCEDURE — 87389 HIV-1 AG W/HIV-1&-2 AB AG IA: CPT | Performed by: INTERNAL MEDICINE

## 2017-11-13 PROCEDURE — 82784 ASSAY IGA/IGD/IGG/IGM EACH: CPT | Performed by: STUDENT IN AN ORGANIZED HEALTH CARE EDUCATION/TRAINING PROGRAM

## 2017-11-13 PROCEDURE — 40000556 ZZH STATISTIC PERIPHERAL IV START W US GUIDANCE

## 2017-11-13 PROCEDURE — 99221 1ST HOSP IP/OBS SF/LOW 40: CPT | Mod: GC | Performed by: INTERNAL MEDICINE

## 2017-11-13 PROCEDURE — 84999 UNLISTED CHEMISTRY PROCEDURE: CPT | Performed by: INTERNAL MEDICINE

## 2017-11-13 PROCEDURE — 86255 FLUORESCENT ANTIBODY SCREEN: CPT | Performed by: INTERNAL MEDICINE

## 2017-11-13 PROCEDURE — 84157 ASSAY OF PROTEIN OTHER: CPT | Performed by: STUDENT IN AN ORGANIZED HEALTH CARE EDUCATION/TRAINING PROGRAM

## 2017-11-13 PROCEDURE — 84153 ASSAY OF PSA TOTAL: CPT | Performed by: STUDENT IN AN ORGANIZED HEALTH CARE EDUCATION/TRAINING PROGRAM

## 2017-11-13 PROCEDURE — 00000102 ZZHCL STATISTIC CYTO WRIGHT STAIN TC: Performed by: STUDENT IN AN ORGANIZED HEALTH CARE EDUCATION/TRAINING PROGRAM

## 2017-11-13 PROCEDURE — 82040 ASSAY OF SERUM ALBUMIN: CPT | Performed by: STUDENT IN AN ORGANIZED HEALTH CARE EDUCATION/TRAINING PROGRAM

## 2017-11-13 PROCEDURE — 83880 ASSAY OF NATRIURETIC PEPTIDE: CPT | Performed by: STUDENT IN AN ORGANIZED HEALTH CARE EDUCATION/TRAINING PROGRAM

## 2017-11-13 PROCEDURE — 88108 CYTOPATH CONCENTRATE TECH: CPT | Performed by: STUDENT IN AN ORGANIZED HEALTH CARE EDUCATION/TRAINING PROGRAM

## 2017-11-13 PROCEDURE — 36415 COLL VENOUS BLD VENIPUNCTURE: CPT | Performed by: STUDENT IN AN ORGANIZED HEALTH CARE EDUCATION/TRAINING PROGRAM

## 2017-11-13 PROCEDURE — 25000125 ZZHC RX 250: Performed by: STUDENT IN AN ORGANIZED HEALTH CARE EDUCATION/TRAINING PROGRAM

## 2017-11-13 PROCEDURE — 83036 HEMOGLOBIN GLYCOSYLATED A1C: CPT | Performed by: NEUROLOGICAL SURGERY

## 2017-11-13 PROCEDURE — 89051 BODY FLUID CELL COUNT: CPT | Performed by: STUDENT IN AN ORGANIZED HEALTH CARE EDUCATION/TRAINING PROGRAM

## 2017-11-13 PROCEDURE — 82164 ANGIOTENSIN I ENZYME TEST: CPT | Performed by: INTERNAL MEDICINE

## 2017-11-13 PROCEDURE — 82378 CARCINOEMBRYONIC ANTIGEN: CPT | Performed by: STUDENT IN AN ORGANIZED HEALTH CARE EDUCATION/TRAINING PROGRAM

## 2017-11-13 PROCEDURE — 12000003 ZZH R&B CRITICAL UMMC

## 2017-11-13 PROCEDURE — 83605 ASSAY OF LACTIC ACID: CPT | Performed by: NEUROLOGICAL SURGERY

## 2017-11-13 PROCEDURE — 82042 OTHER SOURCE ALBUMIN QUAN EA: CPT | Performed by: STUDENT IN AN ORGANIZED HEALTH CARE EDUCATION/TRAINING PROGRAM

## 2017-11-13 RX ORDER — POLYETHYLENE GLYCOL 3350 17 G/17G
17 POWDER, FOR SOLUTION ORAL 3 TIMES DAILY
Status: DISCONTINUED | OUTPATIENT
Start: 2017-11-13 | End: 2017-11-22 | Stop reason: HOSPADM

## 2017-11-13 RX ORDER — LABETALOL HYDROCHLORIDE 5 MG/ML
20 INJECTION, SOLUTION INTRAVENOUS
Status: DISCONTINUED | OUTPATIENT
Start: 2017-11-13 | End: 2017-11-13

## 2017-11-13 RX ORDER — MAGNESIUM CARB/ALUMINUM HYDROX 105-160MG
296 TABLET,CHEWABLE ORAL ONCE
Status: COMPLETED | OUTPATIENT
Start: 2017-11-13 | End: 2017-11-13

## 2017-11-13 RX ORDER — CARVEDILOL 3.12 MG/1
12.5 TABLET ORAL 2 TIMES DAILY WITH MEALS
Status: DISCONTINUED | OUTPATIENT
Start: 2017-11-13 | End: 2017-11-14

## 2017-11-13 RX ORDER — HYDROMORPHONE HCL/0.9% NACL/PF 0.2MG/0.2
0.2 SYRINGE (ML) INTRAVENOUS EVERY 30 MIN PRN
Status: DISCONTINUED | OUTPATIENT
Start: 2017-11-13 | End: 2017-11-13

## 2017-11-13 RX ORDER — BISACODYL 10 MG
10 SUPPOSITORY, RECTAL RECTAL ONCE
Status: DISCONTINUED | OUTPATIENT
Start: 2017-11-13 | End: 2017-11-19

## 2017-11-13 RX ORDER — HYDRALAZINE HYDROCHLORIDE 20 MG/ML
10 INJECTION INTRAMUSCULAR; INTRAVENOUS
Status: DISCONTINUED | OUTPATIENT
Start: 2017-11-13 | End: 2017-11-14

## 2017-11-13 RX ORDER — HYDROMORPHONE HYDROCHLORIDE 1 MG/ML
0.3 INJECTION, SOLUTION INTRAMUSCULAR; INTRAVENOUS; SUBCUTANEOUS EVERY 30 MIN PRN
Status: DISCONTINUED | OUTPATIENT
Start: 2017-11-13 | End: 2017-11-13

## 2017-11-13 RX ORDER — NALOXONE HYDROCHLORIDE 0.4 MG/ML
.1-.4 INJECTION, SOLUTION INTRAMUSCULAR; INTRAVENOUS; SUBCUTANEOUS
Status: DISCONTINUED | OUTPATIENT
Start: 2017-11-13 | End: 2017-11-22 | Stop reason: HOSPADM

## 2017-11-13 RX ORDER — FUROSEMIDE 10 MG/ML
40 INJECTION INTRAMUSCULAR; INTRAVENOUS ONCE
Status: COMPLETED | OUTPATIENT
Start: 2017-11-13 | End: 2017-11-13

## 2017-11-13 RX ORDER — LABETALOL HYDROCHLORIDE 5 MG/ML
10 INJECTION, SOLUTION INTRAVENOUS
Status: DISCONTINUED | OUTPATIENT
Start: 2017-11-13 | End: 2017-11-14

## 2017-11-13 RX ORDER — AMOXICILLIN 250 MG
3 CAPSULE ORAL 2 TIMES DAILY
Status: DISCONTINUED | OUTPATIENT
Start: 2017-11-13 | End: 2017-11-22 | Stop reason: HOSPADM

## 2017-11-13 RX ADMIN — HYDRALAZINE HYDROCHLORIDE 10 MG: 20 INJECTION INTRAMUSCULAR; INTRAVENOUS at 14:56

## 2017-11-13 RX ADMIN — Medication 10 MG: at 19:45

## 2017-11-13 RX ADMIN — Medication 10 MG: at 17:32

## 2017-11-13 RX ADMIN — SODIUM CHLORIDE 1000 ML: 9 INJECTION, SOLUTION INTRAVENOUS at 02:12

## 2017-11-13 RX ADMIN — HYDRALAZINE HYDROCHLORIDE 10 MG: 20 INJECTION INTRAMUSCULAR; INTRAVENOUS at 22:41

## 2017-11-13 RX ADMIN — FUROSEMIDE 40 MG: 10 INJECTION, SOLUTION INTRAVENOUS at 21:36

## 2017-11-13 RX ADMIN — Medication 10 MG: at 22:06

## 2017-11-13 RX ADMIN — MAGNESIUM CITRATE 296 ML: 1.75 LIQUID ORAL at 13:30

## 2017-11-13 RX ADMIN — CARVEDILOL 12.5 MG: 3.12 TABLET, FILM COATED ORAL at 19:43

## 2017-11-13 RX ADMIN — Medication 10 MG: at 20:48

## 2017-11-13 RX ADMIN — POLYETHYLENE GLYCOL 3350 17 G: 17 POWDER, FOR SOLUTION ORAL at 10:22

## 2017-11-13 RX ADMIN — LIDOCAINE HYDROCHLORIDE 1 ML: 10 INJECTION, SOLUTION EPIDURAL; INFILTRATION; INTRACAUDAL; PERINEURAL at 13:29

## 2017-11-13 RX ADMIN — HYDRALAZINE HYDROCHLORIDE 10 MG: 20 INJECTION INTRAMUSCULAR; INTRAVENOUS at 19:57

## 2017-11-13 RX ADMIN — Medication 10 MG: at 23:34

## 2017-11-13 RX ADMIN — POLYETHYLENE GLYCOL 3350 17 G: 17 POWDER, FOR SOLUTION ORAL at 16:35

## 2017-11-13 RX ADMIN — HYDRALAZINE HYDROCHLORIDE 10 MG: 20 INJECTION INTRAMUSCULAR; INTRAVENOUS at 17:51

## 2017-11-13 RX ADMIN — HYDROMORPHONE HYDROCHLORIDE 0.3 MG: 1 INJECTION, SOLUTION INTRAMUSCULAR; INTRAVENOUS; SUBCUTANEOUS at 12:25

## 2017-11-13 RX ADMIN — HYDRALAZINE HYDROCHLORIDE 10 MG: 20 INJECTION INTRAMUSCULAR; INTRAVENOUS at 21:21

## 2017-11-13 RX ADMIN — Medication 10 MG: at 16:22

## 2017-11-13 RX ADMIN — SENNOSIDES AND DOCUSATE SODIUM 3 TABLET: 8.6; 5 TABLET ORAL at 10:21

## 2017-11-13 ASSESSMENT — VISUAL ACUITY
OU: NORMAL ACUITY

## 2017-11-13 NOTE — PLAN OF CARE
Problem: Patient Care Overview  Goal: Plan of Care/Patient Progress Review  Outcome: No Change     VSS except HTN, MD ordered labetalol/hydralazine for BP>160, hydralazine given x1, please recheck BP. Neuros intact, strong in all extremities but per pt feels slightly weaker on L side. Moderate edema in bilateral hands and wrists. MIVF stopped this am and pt now saline locked. Pt voiding spont in large amounts. Pt is transgendered, goes by Priya Vanegas (updated name on board). Reg diet, declines PO, states doesn't feel hungry d/t feeling so constipated. BS+, flatus+. Pt trying to have BM now. Had LP at bedside, pt laid flat for 2 hours afterward. Small amount serosang drainage from LP site. Mag citrate in cup at bedside, pt will drink if does not have BM. Pt may want suppository this evening. Gets up with SBA and walker. On list to get PT/OT ordered. Pt off unit to renal ultrasound this am so assessment done later in shift when pt  back. Continue POC.

## 2017-11-13 NOTE — PROGRESS NOTES
"Neurosurgery Daily Progress Note  11/13/2017    Overnight events/subjective: No acute events overnight. Patient was transferred to the floor yesterday. CT CAP completed.    O/ BP (!) 186/101 (BP Location: Left arm)  Pulse 97  Temp 96  F (35.6  C) (Oral)  Resp 16  Ht 1.765 m (5' 9.5\")  Wt 92.5 kg (203 lb 14.8 oz)  SpO2 97%  BMI 29.68 kg/m2  Exam:   Gen: Laying in bed, not in acute distress  MS: A&Ox3, Speech fluent and conversant   CN: Pupils round and reactive, extraocular movements intact, face symmetric, tongue midline, uvula & palate elevate symmetrically   Motor:     Delt Bi Tri WE WF    R 5 5 5 5 5 5   L 4 5 5 5 5 5     IP Quad Ham DF PF EHL   R 5 5 5 5 5 5   L 4 5 4+ 5 5 5     Sensory: intact to light touch   No drift    Non labored breathing    IMG: reviewed       A/P: Boy Aguirre is a 62 year old with left sided weakness and HA, presenting for OSH with elevated SBPs in the 220s and imaging demonstrating an area of edema with punctate areas of contrast enhancement and diffusion restriction in right frontal lobe. CT CAP demonstrating lymphangitic carcinomatosis.    - Hold steroids.  - MRI completed at OSH, will hold on repeating the study at this time.  - SBP < 140; labetalol PRN to meet the goal.  - Will request analysis of pulmonary effusion by Pulmonology.  - Neurology consult to review the case to provide additional insights into workup.  - LP today; fluid to be sent for cytology, oligoclonal bands, and ACE. Additional labs requested by Neurology will be added.  - Renal u/s.  - f/u CEA and PSA ordered from serum.  - Will test for immune modulating disorders.  - Medicine consult to assess for etiologies of apparently previously unrecognized CKD.  - Regular diet.  - SCDs for DVT ppx  - PO diet for GI ppx    Dispo: No discharge plan established, continuing medical workup at this time. Possible surgical biopsy.      Please contact the neurosurgery resident on call with questions by dialing * [phone] " [phone], then entering [phone] when prompted

## 2017-11-13 NOTE — PROGRESS NOTES
Care Coordinator Progress Note     Admission Date/Time:  11/11/2017  Attending MD:  Dr Dami Lafleur     Data  Chart reviewed, discussed with interdisciplinary team. In 6A Discharge Rounds Charlee Modi NP, reported plan is a lumbar puncture; Pulmonary & Medicine consults.   Per chart notes pt is transgender, male to female.     Patient was admitted for:  Right-sided mass enhancing lesion; left hemispheric enhancing lesions; suspected new bleed.   Pt fell at home on 11-06. Presented to OSH with left-sided weakness; HA & vomiting; elevated BPs. Transferred to [facility] with possible new head bleed.     Concerns with insurance coverage for discharge needs:  No insurance listed on the facesheet. Called Viki, Financial Counselor and she said they are just waiting to confirm pt will be covered by the [facility].     Coordination of Care and Referrals  Looked in paper chart for outside records but there weren't any (they may have been sent to Medical Records).    Called Financial Counselor.   Spoke with pt's nurse, Deisy; she reported pt is up with a walker and asked for assist with shaving today. Nursing will ask for PT/OT orders.      Assessment  Diagnostic testing continues. Pt now using a walker and asked for help with ADLs, will ask for PT/OT evaluations.      Plan  Anticipated Discharge Date:  TBD    Anticipated Discharge Plan:   TBD    --Financial Counseling will verify [facility] coverage.  --Nursing will ask for PT/OT consults.         Shilpa Gonzales, RN Care Coordinator  Unit 6A, [facility]

## 2017-11-13 NOTE — CONSULTS
[facility] Internal Medicine Consultation    Boy Aguirre MRN# [id number]   Age: 62 year old YOB: 1955   Date of Admission: 11/11/2017     Reason for consult: JALIL       Requesting physician Charlee Modi       Level of consult: Consult, follow and place orders           Assessment and Plan:   This patient is a 62 year old  male-to-female transgender without known history of medical issues who presented to OSH on 11/7 after a fall and was found to have L sided weakness and R frontal cerebral edema. Hospitalization complicated by intracranial bleed prompting transfer to [facility] for on-going management. Internal medicine consulted for evaluation of elevated creatinine.     # Non-oliguric JALIL on ?CKD  Likely JALIL, CKD cannot be ruled out given lack of known baseline Cr, although patient doesn't have a history of HTN (last bp in 5/17 which was 102/78) or DM2 (but does have HgbA1c of 6.5% in 2010). With FeNa < 0.8%, there is likely a large degree of JALIL, cannot rule out mild degree of ATN. UA unremarkable. JALIL is possibly a result of end-organ damage from malignant hypertension, potentiated by a hypervolemic state and on-going issues with hypertension. Peak Cr was 2.5 on 11/12, today the Cr is downtrending to 2.1. Renal US performed which is largely normal, again suggesting against CKD. Patient remains hypervolemic on exam and has known diastolic heart dysfunction with dilated IVC on bedside US. CT CAP also remarks on patient's anasarca.   -- Recommend discontinuing maintenance IVFs, she is taking in PO adequately at this point.   -- Would suggest gentle diuresis with lasix IV 40mg once and assess response, however, at this point if primary team is concerned about overly aggressive diuresis would be reasonable to hold additional intake and assess if patient is able to auto-diurese, especially as renal function is already improving.   -- Strict I's and O's, daily standing weights  -- Added on  HgbA1c  -- Daily BMP's     # Metabolic acidosis  Presumed non-anion gap (albumin added on for correction). Likely due to functional RTA from JALIL as above. Improving. CO2 today is 17 (from 13 yesterday). Continue to monitor.     # R frontal vasogenic edema  # R frontal hemorrhage  # CVA with improving L sided weakness  # Concern for malignant oncologic process  Neurosurgery is primary team, have consulted neurology. Given imaging studies there is concern for malignancy, also query hemorrhagic CVA from malignant hypertension (although patient doesn't have a history of hypertension and it would be unclear how the hypertension originated). TTE unremarkable for source of emboli. CT CAP concerning for pulmonary process with pleural effusions and pulmonary nodules, although patient has nearly negligible smoking history and no family history of cancer, no history of rheumatologic symptoms. PSA and CEA are negative.   -- Defer to neurosurgery, neurology and pulmonology for on-going management    # Malignant HTN   Acute onset, patient does not have history of HTN. Suspected due to increased intracranial pressure, however, continues to persist despite improvement in patient's symptoms. Renal US performed but without dopplers. Patient is not taking any estrogens, no adrenal incidentalomas to suggest an etiology of hypertension, however, if HTN is acute in onset in patient of this age, not thought to be related to intracranial pathology and refractory to management could consider additional work-up for causes of secondary HTN. Defer to primary team.     Discussed with Dr. Puentes.     Samantha Kirk DO   PGY-3 [phone]  Medicine consults and procedure service         Chief Complaint:   JALIL     History is obtained from the patient and from chart review.           History of Present Illness (Resident / Clinician):   This patient is a 62 year old  transgender female without history of medical issues who presented to  OSH on 11/7 after a fall and was found to have L sided weakness and R frontal cerebral edema. Hospitalization complicated by intracranial bleed prompting transfer to [facility] for on-going management. Internal medicine consulted for evaluation of elevated creatinine.      Prior to hospitalization, patient was not taking any medications and doctored at the [facility]. On 11/6 she developed some L sided weakness which lead to a fall (no LOC) which prompted evaluation at [facility]. There she also admitted to a headache and was found to be hypertensive to the 210's/139's. CT head there showed R frontal vasogenic edema and follow-up MRI confirmed the finding as well as associated petechial hemorrhage and subcentimeter foci suggestive of subacute infarcts. The differential at that time was venous infarction vs tumor, but venogram was normal. She was started on nitrolgycerin gtt and admitted for further evaluation. Labs on admission revealed Cr of 1.8 with BUN of 19 without history of known renal disease. TTE performed showing EF of 50-55% and grade 1 diastolic dysfunction. On 11/8 patient developed worsened headache and HTN and repeat head CT revealed interval development of 0.5cm focus of acute hemorrhage in the R frontal lobe. Patient was started on steroids and subsequently transferred to [facility] for tertiary care.      On arrival she was admitted to the neurosurgical unit but quickly transferred to the floors. Her admission labs notable for Cr of 2.3, CO2 of 17, BUN of 57. FeNa of 0.8%, UA largely unremarkable with the exception of glucosuria. She has been continued on maintenance IVFs with occasional boluses. Her baseline weight she approximates is 178-184 lbs and her weight today is 204.           Past Medical History:   None          Past Surgical History:     Past Surgical History:   Procedure Laterality Date     TONSILLECTOMY      As a child             Social History:     Social History   Substance Use Topics      Smoking status: Former Smoker     Packs/day: 1.00     Years: 5.00     Types: Cigarettes     Smokeless tobacco: Former User     Quit date: 11/13/1976      Comment: Smoked from age [age], but not since     Alcohol use Yes      Comment: Very rare beer       HEIDI who works with [employer]          Family History:     Family History   Problem Relation Age of Onset     Hypertension Mother      Alzheimer Disease Mother      DIABETES Other      CANCER No family hx of      Family history reviewed and updated in EPIC          Allergies:     Allergies   Allergen Reactions     Insulin Swelling     Face and throat swelling             Medications:     Current Facility-Administered Medications   Medication     senna-docusate (SENOKOT-S;PERICOLACE) 8.6-50 MG per tablet 3 tablet     polyethylene glycol (MIRALAX/GLYCOLAX) Packet 17 g     magnesium citrate solution 296 mL     bisacodyl (DULCOLAX) Suppository 10 mg     HYDROmorphone (PF) (DILAUDID) injection 0.3 mg     lidocaine 1 % 1 mL     lidocaine (LMX4) kit     sodium chloride (PF) 0.9% PF flush 3 mL     sodium chloride (PF) 0.9% PF flush 3 mL     labetalol (NORMODYNE/TRANDATE) injection 10-20 mg     influenza quadrivalent (PF) vacc age 3 yrs and older (FLUZONE or Flulaval) injection 0.5 mL             Review of Systems:   The Review of Systems is negative other than noted in the HPI         Physical Exam (Resident / Clinician):   Vitals were reviewed  Temp: 96  F (35.6  C) Temp src: Oral BP: (!) 190/107 Pulse: 97 Heart Rate: 99 Resp: 16 SpO2: 97 % O2 Device: None (Room air)      General: Pleasant, disheveled appearing   HEENT: PERRL, EOMI, no facial droop  CV: Mildly tachycardic without murmurs or gallops. JVP elevated 12cm  Lungs: Crackles bilaterally noted at lung bases, dullness to percussion at b/l lung bases up 1/3 of thorax  Abdomen: Soft, distended but nontender  Extremities: Warm, mild nonpitting edema present  Neuro: A&O x 4, CN's intact, mild LUE and LLE weakness (4/5)  compared to R.      Bedside US: IVC dilated with mild respiratory variation           Data:     Results for orders placed or performed during the hospital encounter of 17 (from the past 24 hour(s))   Fractional excretion of sodium   Result Value Ref Range    Creatinine Urine 90 mg/dL    Sodium Urine mmol/L 37 mmol/L    %FENA 0.8 %   UA with Microscopic reflex to Culture   Result Value Ref Range    Color Urine Light Yellow     Appearance Urine Clear     Glucose Urine 300 (A) NEG^Negative mg/dL    Bilirubin Urine Negative NEG^Negative    Ketones Urine Negative NEG^Negative mg/dL    Specific Gravity Urine 1.014 1.003 - 1.035    Blood Urine Negative NEG^Negative    pH Urine 5.0 5.0 - 7.0 pH    Protein Albumin Urine Negative NEG^Negative mg/dL    Urobilinogen mg/dL Normal 0.0 - 2.0 mg/dL    Nitrite Urine Negative NEG^Negative    Leukocyte Esterase Urine Negative NEG^Negative    Source Clean catch urine     WBC Urine 1 0 - 2 /HPF    RBC Urine 0 0 - 2 /HPF    Mucous Urine Present (A) NEG^Negative /LPF   Neurology General Adult IP Consult: Patient to be seen: Routine within 24 hrs; Call back #: [id number]; right cerebral edema; Consultant may enter orders: Yes    Narrative    Estrellita Lopez MD     2017 10:44 AM  [facility]  Neurology Consultation    Patient Name:  Boy Aguirre  MRN:  [id number]    :  1955  Date of Service:  2017  Primary care provider:  None      CC/Reason for consult: L sided weakness with R frontal vasogenic   edema    HPI: Ms. Aguirre is a 61 yo transgender female without   significant PMH transferred from [place] for further   evaluation of L sided weakness and MRI findings of R frontal   vasogenic edema with possible petechial hemorrhages.  We are   asked to assist with workup/management thereof.  Ms. Aguirre   initially presented on  after sustaining a fall at home.    Vague about whether she had been having any  "symptoms in the weeks   prior to that, saying she would miss some stairs occasionally but   denying any gilberto weakness prior to 11/6.  She does report she   was normal one month ago and not having any stair difficulties.    Workup at [place] was notable for MRI with vasogenic edema in   posterior right frontal lobe (see CareEverywhere) as well as a   few subacute infarcts and other findings as below.  MRA/MRV were   unrevealing.  On 11/8, she became markedly hypertensive with   associated headache and a follow up head CT showed new hemorrhage   compared to prior MRI.  She was started on a nicardipine drip at   that time. She also had an elevated creatinine there with no   recent prior for comparison; renal ultrasound was unremarkable   but did not include dopplers.  She was receiving high dose   steroids (6mg dexamethasone IV QID) prior to transfer.    Nicardipine has been off for ~24 hours now.    Today, her main complaint is constipation.  Still passing gas and   did have a BM yesterday but states belly still feels \"full of   poop\".  No bladder symptoms.  L sided weakness is nearly resolved   and she is now able to ambulate again.  Left arm back to   baseline.  Denies HA today.    She has no personal or family history of malignancy.  Gets cares   through [facility] (service connected, 0% per his report);   last saw PCP there in May and reports BP was fine at the time   (102/78 she tells me).  [facility] and   showed \"benign polyps\" by her report.  She is unsure if kidney   function was ever checked there and is not sure if she has been   screened for diabetes.  She has been gaining weight lately and   denies any fevers, sweats, chills, or night sweats.  No new   cough.  No concerning skin lesions.  Smoked from ages [age], but   quit upon joining the [employer] and hasn't smoked since.  Very rare   etoh use.    ROS:  Negative except as stated above.    PMHx/PSHx:  PMH:  Childhood " "asthma    PSH:  Tonsilectomy as child    Medications:  Progesterone    Allergies:  Has listed allergy to insulin.    Social Hx:   Social History     Social History     Marital status: Single     Spouse name: N/A     Number of children: N/A     Years of education: N/A     Social History Main Topics     Smoking status: Former Smoker     Packs/day: 1.00     Years: 5.00     Types: Cigarettes     Smokeless tobacco: Never Used      Comment: Smoked from age [age], but not since     Alcohol use Yes      Comment: Very rare beer     Drug use: None     Sexual activity: Not Asked     Other Topics Concern     None     Social History Narrative    [employer] , served 1977 - 1979 then in [employer] from 1979 -   1989.  Remote former smoking history.  Very rare etoh.       Family Hx:   No family history of malignancy, rheumatologic disease.  No   neurologic disease aside from Alzheimer's in mother (onset in   70s).    Exam:  BP (!) 186/101 (BP Location: Left arm)  Pulse 97  Temp 96  F   (35.6  C) (Oral)  Resp 16  Ht 1.765 m (5' 9.5\")  Wt 92.5 kg   (203 lb 14.8 oz)  SpO2 97%  BMI 29.68 kg/m2    Gen: NAD, resting comfortably in bed  Neck: no meningismus, full ROM  Res: no labored breathing  Abd: soft, mildly distended, no clear fluid wave.  Mild dullness   to percussion.  Ext: 1+ b/l LE edema    Neuro:   Mental status: awake, fluent speech, follows commands. 5/5   mini-cog.  Cranial nerves: PERRL, EOMI, no nystagmus, VF intact, sensation   to LT intact in V1-3 distribution, symmetric facial expressions,   no dysarthria, tongue protrusion midline, symmetric shoulder   shrug.  Motor: normal tone, 5/5 strength throughout except for L hip   extension which is 4+/5, no tremor or asterixis.   Sensory: intact & symmetric to LT throughout, no extinction.   Reflexes: 2+ symmetric throughout, down-going toes, no clonus  Coordination: FNF without dysmetria.  Gait: deferred    Pertinent Data:  MRI Brain with and without contrast " 11/7/2017:  CONCLUSION:  1.  Broad zone of vasogenic edema in the posterior right frontal   lobe with edema centered along the cortex of the right precentral   sulcus and subcortical white matter. There is scattered   associated susceptibility signal or petechial hemorrhage. No   definite enhancing underlying enhancing lesion is identified.   This is nonspecific, but may be related to a cortical vein   thrombosis. Unusual neoplasm with associated vasogenic edema is   also possible. Follow-up is recommended.  2.  There are a couple of subcentimeter foci of restricted   diffusion with enhancement within the deep right parietal and   left frontal white matter most suggestive of subacute infarcts.   Small enhancing mass lesions are felt to be less likely.  3.  Mild age-related changes.    MRA Brain 11/7/2017:  CONCLUSION:  1.  No high-grade stenosis or occlusion of the major intracranial   arteries and the proximal intracranial arteries have normal   caliber. No [beading] to suggest vasculitis.  2.  No intracranial aneurysms.    MRV Brain 11/7/2017:  CONCLUSION:  The head MRV is within normal limits.    Echo 11/7/2017:  Interpretation Summary  There is no evidence of a mass or vegetation. This does not rule   out endocarditis. ====  1. Normal LV size and function. LVEF 50 -55%  2. Normal RV size and function  3. No significant valvular disease  4. Normal pericardium.    Renal US 11/8/2017 (no dopplers):  IMPRESSION:   1.  No hydronephrosis regarding the kidneys. Kidneys normal in   size.  2.  Question some mild bladder wall thickening    CT Head 11/10/2017:  CONCLUSION:  1.  Stable exam with stable vasogenic edema involving the   posterior right frontal white matter and stable size of the small   focus of hemorrhage along the posterior superior right frontal   lobe.    CT Head 11/11/2017 (here):  Impression: Small hyperdense focus in the periphery of the high   right  frontal lobe may represent intraparenchymal  hemorrhage. Confluent  edema in the high right frontal lobe. Comparison with previous   imaging  studies would be likely useful, if available.    CT CAP wo contrast 11/11/2017   Impression:   1. Bilateral apical predominant perilymphatic distribution   pulmonary  nodules and septal thickening can be seen in lymphangitic  carcinomatosis. Mixed groundglass and nodular opacities in the   lung  bases and likely infectious or inflammatory in etiology and   follow-up  to resolution is recommended with low-dose chest CT in 3 months.  2. Smooth interlobular septal thickening and scattered   groundglass  opacities likely representing pulmonary edema.  3. Small bilateral pleural effusions and adjacent basilar   atelectasis  and/or consolidation.  4. Small volume ascites of unclear etiology in this noncontrast  examination although may relate to volume overload given soft   tissue  anasarca and pulmonary edema.    WBC: 13.55 (11/7) -> 21.6 (11/9) -> 15.3  Cr: 2.1 (unk baseline)  CRP: <2.9  ESR: 15  Procal: 0.1  PSA: 1.0      Impression:  63 yo transgender female without PMH with LLE > LUE weakness and   found to have R frontal vasogenic edema by MRI with small   hemorrhage by follow up CT.  Clinically improving, but etiology   remains elusive.  Malignancy is certainly on differential, with   consideration for either primary CNS vs metastatic.  No clear   symptoms to point to primary, but CT c/a/p does have some   findings concerning for pulmonary process.  Unremarkable   colonoscopy in 2007.  PSA normal. Overall clinical picture is   generally not consistent with vasculitis and inflammatory markers   are entirely normal, but this could also be considered if workup   remains unrevealing.   Aside from leukocytosis she does not have   other evidence for infection; notably, this leukocytosis does   precede steroid administration.  While very unlikely, LIBERTY virus   should be checked.  No clinical evidence for seizure per   available  records.  There is no mass effect on CT or MRI, and LP   would not be of higher risk and we would therefore recommend it.    Depending on workup, may need PET to evaluate for occult   malignancy.      Recommendations:  -Consider pulmonary consult for possible biopsy sites  -High volume LP and send CSF for:  --Usual studies (cell counts, protein, glucose)  --LIBERTY Virus  --Freeze extra tube for possible f/u viral studies  --Oligoclonal bands  --Paraneoplastic Panel  --Cytology  -Additional Blood Studies: HIV, anti-MOG, aquaporin-4 antibody,   ACE (we have ordered these for you)    Thank you for involving us in the care of this patient.  Please   do not hesitate to page with any questions or concerns.  We will   continue to follow.    Patient seen & discussed with Neurology Staff, Dr. Giovanni Lopez MD, PhD  PGY-2, Internal Medicine  Pager: [phone]     Basic metabolic panel   Result Value Ref Range    Sodium 138 133 - 144 mmol/L    Potassium 4.2 3.4 - 5.3 mmol/L    Chloride 111 (H) 94 - 109 mmol/L    Carbon Dioxide 16 (L) 20 - 32 mmol/L    Anion Gap 11 3 - 14 mmol/L    Glucose 213 (H) 70 - 99 mg/dL    Urea Nitrogen 73 (H) 7 - 30 mg/dL    Creatinine 2.39 (H) 0.66 - 1.25 mg/dL    GFR Estimate 28 (L) >60 mL/min/1.7m2    GFR Estimate If Black 34 (L) >60 mL/min/1.7m2    Calcium 8.1 (L) 8.5 - 10.1 mg/dL   Lactic acid level STAT   Result Value Ref Range    Lactic Acid 2.0 0.7 - 2.0 mmol/L   Basic metabolic panel   Result Value Ref Range    Sodium 142 133 - 144 mmol/L    Potassium 4.4 3.4 - 5.3 mmol/L    Chloride 114 (H) 94 - 109 mmol/L    Carbon Dioxide 18 (L) 20 - 32 mmol/L    Anion Gap 10 3 - 14 mmol/L    Glucose 158 (H) 70 - 99 mg/dL    Urea Nitrogen 59 (H) 7 - 30 mg/dL    Creatinine 2.12 (H) 0.66 - 1.25 mg/dL    GFR Estimate 32 (L) >60 mL/min/1.7m2    GFR Estimate If Black 39 (L) >60 mL/min/1.7m2    Calcium 7.7 (L) 8.5 - 10.1 mg/dL   CEA   Result Value Ref Range    CEA <0.5 0 - 2.5 ug/L   PSA tumor marker    Result Value Ref Range    PSA 1.00 0 - 4 ug/L   Nt probnp inpatient   Result Value Ref Range    N-Terminal Pro BNP Inpatient 5950 (H) 0 - 900 pg/mL   US Renal Complete    Narrative    Exam: Renal ultrasound,  11/13/2017 9:37 AM     Comparison: Exam: Renal ultrasound,  11/13/2017 9:37 AM     Comparison: CT chest/abdomen/pelvis dated 11/11/2017    History: New JALLI & brain tumor, unclear etiology;     Findings:   The right kidney measures 9.9 cm, demonstrates normal echotexture  without evidence of cystic lesion or mass. There is no hydronephrosis  or hydroureter. Trace extrarenal fluid collection.    The left kidney measures 10.0 cm, demonstrates normal echotexture  without evidence of cystic lesion or mass. There is no hydronephrosis  or hydroureter.     Bladder:  Unremarkable.       Impression    Impression: Trace right perinephric fluid. No hydronephrosis.    I have personally reviewed the examination and initial interpretation  and I agree with the findings.    JOSHUA VELÁZQUEZ MD

## 2017-11-13 NOTE — PROGRESS NOTES
SPIRITUAL HEALTH SERVICES  SPIRITUAL ASSESSMENT Progress Note  [facility] ([place]) 6A    REFERRAL SOURCE: On-call , in response to hospital  request at admission.    Attempted. Patient had been visited by the on-call  on 11/11 and 11/12. In both cases, the visit was declined by the patient. I attempted twice on 11/13 -- the pt was away from the room on the first, and sleeping on the second.    PLAN: I will follow up with the patient on my next shift (Sat 11/18), if he remains on 6A.                                                                                                                                           Grabiel Winn   Intern  Pager [phone]

## 2017-11-13 NOTE — CONSULTS
"[facility]  Neurology Consultation    Patient Name:  Boy Aguirre  MRN:  [id number]    :  1955  Date of Service:  2017  Primary care provider:  None      CC/Reason for consult: L sided weakness with R frontal vasogenic edema    HPI: Ms. Aguirre is a 63 yo transgender female without significant PMH transferred from [place] for further evaluation of L sided weakness and MRI findings of R frontal vasogenic edema with possible petechial hemorrhages.  We are asked to assist with workup/management thereof.  Ms. Aguirre initially presented on  after sustaining a fall at home.  Vague about whether she had been having any symptoms in the weeks prior to that, saying she would miss some stairs occasionally but denying any gilberto weakness prior to .  She does report she was normal one month ago and not having any stair difficulties.  Workup at [place] was notable for MRI with vasogenic edema in posterior right frontal lobe (see CareEverywhere) as well as a few subacute infarcts and other findings as below.  MRA/MRV were unrevealing.  On , she became markedly hypertensive with associated headache and a follow up head CT showed new hemorrhage compared to prior MRI.  She was started on a nicardipine drip at that time. She also had an elevated creatinine there with no recent prior for comparison; renal ultrasound was unremarkable but did not include dopplers.  She was receiving high dose steroids (6mg dexamethasone IV QID) prior to transfer.  Nicardipine has been off for ~24 hours now.    Today, her main complaint is constipation.  Still passing gas and did have a BM yesterday but states belly still feels \"full of poop\".  No bladder symptoms.  L sided weakness is nearly resolved and she is now able to ambulate again.  Left arm back to baseline.  Denies HA today.    She has no personal or family history of malignancy.  Gets cares through [facility] " "(service connected, 0% per his report); last saw PCP there in May and reports BP was fine at the time (102/78 she tells me).  [facility] ([facility]) and showed \"benign polyps\" by her report.  She is unsure if kidney function was ever checked there and is not sure if she has been screened for diabetes.  She has been gaining weight lately and denies any fevers, sweats, chills, or night sweats.  No new cough.  No concerning skin lesions.  Smoked from ages [age], but quit upon joining the [employer] and hasn't smoked since.  Very rare etoh use.    ROS:  Negative except as stated above.    PMHx/PSHx:  PMH:  Childhood asthma    PSH:  Tonsilectomy as child    Medications:  Progesterone    Allergies:  Has listed allergy to insulin.    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  Remote former smoking history.  Very rare etoh.       Family Hx:   No family history of malignancy, rheumatologic disease.  No neurologic disease aside from Alzheimer's in mother (onset in 70s).    Exam:  BP (!) 186/101 (BP Location: Left arm)  Pulse 97  Temp 96  F (35.6  C) (Oral)  Resp 16  Ht 1.765 m (5' 9.5\")  Wt 92.5 kg (203 lb 14.8 oz)  SpO2 97%  BMI 29.68 kg/m2    Gen: NAD, resting comfortably in bed  Neck: no meningismus, full ROM  Res: no labored breathing  Abd: soft, mildly distended, no clear fluid wave.  Mild dullness to percussion.  Ext: 1+ b/l LE edema    Neuro:   Mental status: awake, " fluent speech, follows commands. 5/5 mini-cog.  Cranial nerves: PERRL, EOMI, no nystagmus, VF intact, sensation to LT intact in V1-3 distribution, symmetric facial expressions, no dysarthria, tongue protrusion midline, symmetric shoulder shrug.  Motor: normal tone, 5/5 strength throughout except for L hip extension which is 4+/5, no tremor or asterixis.   Sensory: intact & symmetric to LT throughout, no extinction.   Reflexes: 2+ symmetric throughout, down-going toes, no clonus  Coordination: FNF without dysmetria.  Gait: deferred    Pertinent Data:  MRI Brain with and without contrast 11/7/2017:  CONCLUSION:  1.  Broad zone of vasogenic edema in the posterior right frontal lobe with edema centered along the cortex of the right precentral sulcus and subcortical white matter. There is scattered associated susceptibility signal or petechial hemorrhage. No definite enhancing underlying enhancing lesion is identified. This is nonspecific, but may be related to a cortical vein thrombosis. Unusual neoplasm with associated vasogenic edema is also possible. Follow-up is recommended.  2.  There are a couple of subcentimeter foci of restricted diffusion with enhancement within the deep right parietal and left frontal white matter most suggestive of subacute infarcts. Small enhancing mass lesions are felt to be less likely.  3.  Mild age-related changes.    MRA Brain 11/7/2017:  CONCLUSION:  1.  No high-grade stenosis or occlusion of the major intracranial arteries and the proximal intracranial arteries have normal caliber. No [beading] to suggest vasculitis.  2.  No intracranial aneurysms.    MRV Brain 11/7/2017:  CONCLUSION:  The head MRV is within normal limits.    Echo 11/7/2017:  Interpretation Summary  There is no evidence of a mass or vegetation. This does not rule out endocarditis. ====  1. Normal LV size and function. LVEF 50 -55%  2. Normal RV size and function  3. No significant valvular disease  4. Normal  pericardium.    Renal US 11/8/2017 (no dopplers):  IMPRESSION:   1.  No hydronephrosis regarding the kidneys. Kidneys normal in size.  2.  Question some mild bladder wall thickening    CT Head 11/10/2017:  CONCLUSION:  1.  Stable exam with stable vasogenic edema involving the posterior right frontal white matter and stable size of the small focus of hemorrhage along the posterior superior right frontal lobe.    CT Head 11/11/2017 (here):  Impression: Small hyperdense focus in the periphery of the high right  frontal lobe may represent intraparenchymal hemorrhage. Confluent  edema in the high right frontal lobe. Comparison with previous imaging  studies would be likely useful, if available.    CT CAP wo contrast 11/11/2017   Impression:   1. Bilateral apical predominant perilymphatic distribution pulmonary  nodules and septal thickening can be seen in lymphangitic  carcinomatosis. Mixed groundglass and nodular opacities in the lung  bases and likely infectious or inflammatory in etiology and follow-up  to resolution is recommended with low-dose chest CT in 3 months.  2. Smooth interlobular septal thickening and scattered groundglass  opacities likely representing pulmonary edema.  3. Small bilateral pleural effusions and adjacent basilar atelectasis  and/or consolidation.  4. Small volume ascites of unclear etiology in this noncontrast  examination although may relate to volume overload given soft tissue  anasarca and pulmonary edema.    WBC: 13.55 (11/7) -> 21.6 (11/9) -> 15.3  Cr: 2.1 (unk baseline)  CRP: <2.9  ESR: 15  Procal: 0.1  PSA: 1.0      Impression:  63 yo transgender female without PMH with LLE > LUE weakness and found to have R frontal vasogenic edema by MRI with small hemorrhage by follow up CT.  Clinically improving, but etiology remains elusive.  Malignancy is certainly on differential, with consideration for either primary CNS vs metastatic.  No clear symptoms to point to primary, but CT c/a/p does  have some findings concerning for pulmonary process.  Unremarkable colonoscopy in 2007.  PSA normal. Overall clinical picture is generally not consistent with vasculitis and inflammatory markers are entirely normal, but this could also be considered if workup remains unrevealing.   Aside from leukocytosis she does not have other evidence for infection; notably, this leukocytosis does precede steroid administration.  While very unlikely, LIBERTY virus should be checked.  No clinical evidence for seizure per available records.  There is no mass effect on CT or MRI, and LP would not be of higher risk and we would therefore recommend it.  Depending on workup, may need PET to evaluate for occult malignancy.      Recommendations:  -Consider pulmonary consult for possible biopsy sites  -High volume LP and send CSF for:  --Usual studies (cell counts, protein, glucose)  --LIBERTY Virus  --Freeze extra tube for possible f/u viral studies  --Oligoclonal bands  --Paraneoplastic Panel  --Cytology  -Additional Blood Studies: HIV, anti-MOG, aquaporin-4 antibody, ACE (we have ordered these for you)    Thank you for involving us in the care of this patient.  Please do not hesitate to page with any questions or concerns.  We will continue to follow.    Patient seen & discussed with Neurology Staff, Dr. Giovanni Lopez MD, PhD  PGY-2, Internal Medicine  Pager: [phone]

## 2017-11-13 NOTE — PLAN OF CARE
Problem: Patient Care Overview  Goal: Plan of Care/Patient Progress Review  Outcome: No Change  Pt here for left sided weakness; which per pt is improving. AVSS - HTN with in parameters and tachy at times HR in 90 s-105 per pt is his norm. Neuros improving:  LUE 5/5 and LLE 5/5 but slighty weaker than right side, no numbness nor tingling, and tremors in LUE. Moderate edema noted in both hands and wrists - per pt occurred a couple of days ago after IVs infiltrated also has mild edema in bilat. Feet and trace in legs from knees down bilat. Dr. Gongora updated. Bolus of 1,000ml of NS given d/t low GFR and MRI is needed. MIVF at 125ml/hr. Pt VDSP in large amounts.  Denied H/A. Pt is transgendered. Tolerating regular diet well. Pt states he has only had a BM a couple of times this month; last BM yesterday but still feels constipated - Mag. Citrate ordered but pt would like to wait until he is awake for the day to take. Up with SBA. Will continue to monitor and follow with POC.

## 2017-11-13 NOTE — PROCEDURES
[facility] Procedure Note          Lumbar Puncture:      Time: 12:07 PM  Performed by: Kaye Mckeon  Authorized by: Kaye Mckeon    Indications: concern for brain tumor    Consent given by: Patient who states understanding of the procedure being performed after discussing the risks, benefits and alternatives.    Prior to the start of the procedure and with procedural staff participation, I verbally confirmed the patient s identity using two indicators, relevant allergies, that the procedure was appropriate and matched the consent or emergent situation, and that the correct equipment/implants were available. Immediately prior to starting the procedure I conducted the Time Out with the procedural staff and re-confirmed the patient s name, procedure, and site/side. (The Joint Commission universal protocol was followed.) Yes    Under sterile conditions the patient was positioned L Lateral decubitus with knees drawn up. Betadine solution and sterile drapes were utilized.  Local anesthetic at the site: 2 ml of lidocaine 1% without epinephrine from the LP tray  A 21 G  spinal needle was inserted at the L 3-4 interspace.  Opening [id number] cm H2O pressure.  A total of 12mL of clear and colorless spinal fluid was obtained and sent to the laboratory.   After the needle was removed, a bandaid and pressure were applied and the patient was instructed to stay horizontal until the results were back.    Complications:  None    Patient tolerance: Patient tolerated the procedure well with no immediate complications.

## 2017-11-13 NOTE — PLAN OF CARE
Problem: Patient Care Overview  Goal: Plan of Care/Patient Progress Review  Outcome: No Change  Pt intermittently tachy (100-105), elevated SBP (180s-160s) but w/in parameters. Sepsis triggered w/ 2000 VS, lactic draw was 2.0. A&Ox4. L side slightly weaker than R (greatly improved per pt), otherwise intact. Slight tremors in LUE. Denied pain/HA this shift. Unable to complete MRI d/t low GFR. MIVF @ 125mL/hr thru PIV. Tolerating regular diet. Up SBA. Voiding spont. Pt c/o abdominal discomfort and reports not having many BMs within the last month, MDs aware. On scheduled senna but no other bowel meds available currently. Continue with POC.

## 2017-11-13 NOTE — CONSULTS
Pulmonary Consult Note    Assessment and Recommendations:  62M with PMHx of asthma present to an OSH after a fall at home on 11/7/17. He denies hitting his head. He complains of worsening left sided weakness (LL>ROGERIO) over the past few weeks. Had an MRI which showed evidence of vasogenic edema in the posterior right frontal lobe. While on the floor, he had onset of severe HA and worsening BP so they repeated a CT of the head which showed a new intracranial hemorrhage. He was transferred to [facility] for further work up and management by neurosurgery.     #b/l pleural effusions, possibly malignant  -thoracentesis not recommended at this time   -recommend PET scan for assessment of mass    #intracranial hemorrhage  #vasogenic edema of posterior right frontal lobe  -managed by neurology/neurosurgery    Summary:  62M with PMHx of asthma present to an OSH after a fall at home on 11/7/17. He denies hitting his head. He complains of worsening left sided weakness (LL>ROGERIO) over the past few weeks. Had an MRI which showed evidence of vasogenic edema in the posterior right frontal lobe. While on the floor, he had onset of severe HA and worsening BP so they repeated a CT of the head which showed a new intracranial hemorrhage.     Today is doing well. Denies SOB, cough, wheezing. Smoking pack year of 4. Denies any history of pulmonary diseases or family history of lung disease. No known chemical or environmental toxins.     10 point review of systems negative, aside from that mentioned above    Past Medical History:   Diagnosis Date     Male-to-female transgender person        Past Surgical History:   Procedure Laterality Date     TONSILLECTOMY      As a child       Family History   Problem Relation Age of Onset     Hypertension Mother      Alzheimer Disease Mother      DIABETES Other      CANCER No family hx of        Social History     Social History     Marital status: Single     Spouse name: N/A     Number of children: N/A     Years  "of education: N/A     Occupational History     Not on file.     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  Remote former smoking history.  Very rare etoh.       BP (!) 185/106 (BP Location: Left arm)  Pulse 97  Temp 96.7  F (35.9  C) (Oral)  Resp 16  Ht 1.765 m (5' 9.5\")  Wt 92.5 kg (203 lb 14.8 oz)  SpO2 97%  BMI 29.68 kg/m2  Alert, NAD, Ax4  Anicteric, MM  RRR, no m/g/r  CTA-B, no wheezes or crackles  Ab soft, NTND    Labs: personally reviewed  Cr 2.39, BUN 73, CO2 16, WBC 15.3, Albumin 2.8  LA 2.0, Procalcitonin 0.10, CRP <2.9  BNP 5950  CEA <0.5    Imaging: personally reviewed  CT of the Chest, Abdomen and Pelvis dated 11/11/17  Impression:   1. Bilateral apical predominant perilymphatic distribution pulmonary  nodules and septal thickening can be seen in lymphangitic  carcinomatosis. Mixed groundglass and nodular opacities in the lung  bases and likely infectious or inflammatory in etiology and follow-up  to resolution is recommended with low-dose chest CT in 3 months.  2. Smooth interlobular septal thickening and scattered groundglass  opacities likely representing pulmonary edema.  3. Small bilateral pleural effusions and adjacent basilar atelectasis  and/or consolidation.  4. Small volume ascites of unclear etiology in this noncontrast  examination although may relate to volume overload given soft tissue  anasarca and pulmonary edema.    CT of the Head dated 11/11/17  Impression: Small hyperdense focus in the periphery of the high right  frontal lobe may represent intraparenchymal " hemorrhage. Confluent  edema in the high right frontal lobe.    Venkatesh Adame MD  Pulmonary and Critical Care Medicine  [phone]

## 2017-11-13 NOTE — PROGRESS NOTES
[facility]  NeuroICU Daily ICU Note:          Assessment   Boy Aguirre is a 62 year old male who presented with Lt sided weakness LL>UL on 11/6 with headache, MRI Brain shows Rt sided Mass enhancing lesion with Lt hemispheric 2 small enhancing lesions. Went to outside hospital, has worsened headache & vomiting with new suspected bleed on CT head that is why he was transferred to us.          Subjective (24 hours events)     No acute events overnight.           Plan 1.   Neuro:    Continue frequent neuro exams while in ICU. Notify MD for acute changes in exam.    Pain control    Repeat MRI head w/wo contrust    If stable will transferee to [facility]  2. CVS: hemodynamically stable    Maintain SBP < 180    Continue nicardine drip    Hydralazine and labetolol PRN    Continuous cardiac monitoring while in ICU  3. Pulmonary: no issues    Continuous pulse oximetry    Supplemental oxygen PRN    Incentive spirometry Q1H while awake    Daily chest X ray as needed     Consult palmonary for mass work up  4. GI:     Advance diet as tolerated to regular    Bowel regimen. PRN anti-emetics.    Protonix for ulcer ppx  5. Renal: CKD    mIVF -- TKO when patient is tolerating good PO intake    Electrolyte replacement protocol    Continue to monitor intake/output    Daily BMP    Get better history of his primary care physicain  6. ID: afebrile, normal WBC count    Continue to monitor for fevers and/or signs of infection  7. Endocrine: Insulin allergy??    Continue glucose checks  8. Heme: no issues    Platelets > 100,000    INR < 1.5    Hemoglobin > 7    Daily CBC  9. Prophylaxis    DVT: SCDs while in bed    GI: Protonix  10. Disposition: Surgical ICU    PT/OT    Above patient and plan has been discussed with the neuro ICU Attending.                Objective   Temp:  [96.1  F (35.6  C)-97.9  F (36.6  C)] 96.1  F (35.6  C)  Pulse:  [97] 97  Heart Rate:  [] 100  Resp:  [12-34] 20  BP: (117-180)/(65-91)  180/83  SpO2:  [85 %-96 %] 94 %    No Data Recorded    Resp: 20    I/O last 3 completed shifts:  In: 4145.75 [P.O.:400; I.V.:3245.75; IV Piggyback:500]  Out: 1905 [Urine:1905]    Physical Exam  General: NAD, lying comfortably in bed  Incision: clean, dry, dressing intact  Neurologic    Mental Status:       -- Awake; Alert; oriented x 3      -- Follows commands       -- Speech fluent, spontaneous. No aphasia or dysarthria.      -- no gaze preference. No apparent hemineglect.    Cranial Nerves:      -- visual fields full to confrontation, PERRL 3-2mm bilat and brisk      -- extraocular movements intact      -- face symmetrical, tongue midline      -- sensory V1-V3 intact bilaterally      -- palate elevates symmetrically, uvula midline      -- hearing grossly intact bilat    Motor:    Delt Bi Tri WE WF    R 5 5 5 5 5 5   L 4 4 4 4 4 5    IP Quad Ham DF PF EHL   R 5 5 5 5 5 5   L 4 4 4 5 5 5     Sensory: symmetrically intact to light touch x4 extremities.     Reflexes: 2+ bilaterally and symmetric in biceps, triceps, brachioradialis, and patellae; no ankle clonus bilaterally; negative Babinski bilaterally; negative Yo's bilaterally      Labs and Imaging   BMP    Recent Labs  Lab 11/12/17  1645 11/12/17  0740 11/11/17  1313    138 141   POTASSIUM 4.2 4.4 4.4   CHLORIDE 111* 112* 113*   CO2 16* 13* 17*   BUN 73* 73* 57*   CR 2.39* 2.54* 2.33*   ALICIA 8.1* 8.1* 7.9*       CBC    Recent Labs  Lab 11/12/17  0740 11/11/17  1313   WBC 15.3* 17.9*   HGB 11.8* 12.8*    245       COAGS    Recent Labs  Lab 11/11/17  1313   INR 1.03   PTT 23        ABGNo results for input(s): PH, PCO2, PO2, HCO3 in the last 168 hours.    CRP/ESR    Recent Labs  Lab 11/12/17  0740   CRP <2.9       CSFNo results for input(s): CGLU, CTP in the last 168 hours.    Invalid input(s): CCSF    MICRONo results for input(s): CULT in the last 168 hours.    IMAGING:   CT head 11/11: Small hyperdense focus in the periphery of the high  right  frontal lobe may represent intraparenchymal hemorrhage. Confluent  edema in the high right frontal lobe.    CT chest Abdomen & pelvis 11/11:  shows Lymphamatosis carcinomatosa of the Lung

## 2017-11-14 ENCOUNTER — APPOINTMENT (OUTPATIENT)
Dept: OCCUPATIONAL THERAPY | Facility: CLINIC | Age: 62
DRG: 515 | End: 2017-11-14
Attending: STUDENT IN AN ORGANIZED HEALTH CARE EDUCATION/TRAINING PROGRAM
Payer: COMMERCIAL

## 2017-11-14 ENCOUNTER — APPOINTMENT (OUTPATIENT)
Dept: PET IMAGING | Facility: CLINIC | Age: 62
DRG: 515 | End: 2017-11-14
Attending: NURSE PRACTITIONER
Payer: COMMERCIAL

## 2017-11-14 LAB
ACE SERPL-CCNC: 22 U/L (ref 9–67)
ANION GAP SERPL CALCULATED.3IONS-SCNC: 11 MMOL/L (ref 3–14)
BUN SERPL-MCNC: 50 MG/DL (ref 7–30)
CALCIUM SERPL-MCNC: 8.2 MG/DL (ref 8.5–10.1)
CHLORIDE SERPL-SCNC: 107 MMOL/L (ref 94–109)
CO2 SERPL-SCNC: 23 MMOL/L (ref 20–32)
CREAT SERPL-MCNC: 2.17 MG/DL (ref 0.66–1.25)
GFR SERPL CREATININE-BSD FRML MDRD: 31 ML/MIN/1.7M2
GLUCOSE BLDC GLUCOMTR-MCNC: 183 MG/DL (ref 70–99)
GLUCOSE SERPL-MCNC: 163 MG/DL (ref 70–99)
POTASSIUM SERPL-SCNC: 3.8 MMOL/L (ref 3.4–5.3)
SODIUM SERPL-SCNC: 140 MMOL/L (ref 133–144)

## 2017-11-14 PROCEDURE — A9552 F18 FDG: HCPCS | Performed by: NEUROLOGICAL SURGERY

## 2017-11-14 PROCEDURE — 40000133 ZZH STATISTIC OT WARD VISIT

## 2017-11-14 PROCEDURE — 97165 OT EVAL LOW COMPLEX 30 MIN: CPT | Mod: GO

## 2017-11-14 PROCEDURE — 78816 PET IMAGE W/CT FULL BODY: CPT | Mod: PI

## 2017-11-14 PROCEDURE — 25000132 ZZH RX MED GY IP 250 OP 250 PS 637: Performed by: STUDENT IN AN ORGANIZED HEALTH CARE EDUCATION/TRAINING PROGRAM

## 2017-11-14 PROCEDURE — 99233 SBSQ HOSP IP/OBS HIGH 50: CPT | Mod: GC | Performed by: PEDIATRICS

## 2017-11-14 PROCEDURE — 25000132 ZZH RX MED GY IP 250 OP 250 PS 637: Performed by: INTERNAL MEDICINE

## 2017-11-14 PROCEDURE — 25000128 H RX IP 250 OP 636: Performed by: STUDENT IN AN ORGANIZED HEALTH CARE EDUCATION/TRAINING PROGRAM

## 2017-11-14 PROCEDURE — 00000146 ZZHCL STATISTIC GLUCOSE BY METER IP

## 2017-11-14 PROCEDURE — 36415 COLL VENOUS BLD VENIPUNCTURE: CPT | Performed by: NEUROLOGICAL SURGERY

## 2017-11-14 PROCEDURE — 97535 SELF CARE MNGMENT TRAINING: CPT | Mod: GO

## 2017-11-14 PROCEDURE — 25000128 H RX IP 250 OP 636: Performed by: NURSE PRACTITIONER

## 2017-11-14 PROCEDURE — 34300033 ZZH RX 343: Performed by: NEUROLOGICAL SURGERY

## 2017-11-14 PROCEDURE — 80048 BASIC METABOLIC PNL TOTAL CA: CPT | Performed by: NEUROLOGICAL SURGERY

## 2017-11-14 PROCEDURE — 25000128 H RX IP 250 OP 636: Performed by: INTERNAL MEDICINE

## 2017-11-14 PROCEDURE — 12000001 ZZH R&B MED SURG/OB UMMC

## 2017-11-14 PROCEDURE — 25000132 ZZH RX MED GY IP 250 OP 250 PS 637: Performed by: NURSE PRACTITIONER

## 2017-11-14 PROCEDURE — 97530 THERAPEUTIC ACTIVITIES: CPT | Mod: GO

## 2017-11-14 RX ORDER — PROCHLORPERAZINE 25 MG
25 SUPPOSITORY, RECTAL RECTAL EVERY 12 HOURS PRN
Status: DISCONTINUED | OUTPATIENT
Start: 2017-11-14 | End: 2017-11-22 | Stop reason: HOSPADM

## 2017-11-14 RX ORDER — CARVEDILOL 25 MG/1
25 TABLET ORAL 2 TIMES DAILY WITH MEALS
Status: DISCONTINUED | OUTPATIENT
Start: 2017-11-14 | End: 2017-11-22 | Stop reason: HOSPADM

## 2017-11-14 RX ORDER — AMILORIDE HYDROCHLORIDE 5 MG/1
10 TABLET ORAL DAILY
Status: DISCONTINUED | OUTPATIENT
Start: 2017-11-14 | End: 2017-11-22 | Stop reason: HOSPADM

## 2017-11-14 RX ORDER — HYDRALAZINE HYDROCHLORIDE 20 MG/ML
20 INJECTION INTRAMUSCULAR; INTRAVENOUS
Status: DISCONTINUED | OUTPATIENT
Start: 2017-11-14 | End: 2017-11-14

## 2017-11-14 RX ORDER — SODIUM CHLORIDE 9 MG/ML
INJECTION, SOLUTION INTRAVENOUS CONTINUOUS
Status: DISCONTINUED | OUTPATIENT
Start: 2017-11-14 | End: 2017-11-15

## 2017-11-14 RX ORDER — LABETALOL HYDROCHLORIDE 5 MG/ML
20 INJECTION, SOLUTION INTRAVENOUS
Status: DISCONTINUED | OUTPATIENT
Start: 2017-11-14 | End: 2017-11-15

## 2017-11-14 RX ORDER — ONDANSETRON 2 MG/ML
4-8 INJECTION INTRAMUSCULAR; INTRAVENOUS EVERY 6 HOURS PRN
Status: DISCONTINUED | OUTPATIENT
Start: 2017-11-14 | End: 2017-11-22 | Stop reason: HOSPADM

## 2017-11-14 RX ORDER — AMLODIPINE BESYLATE 5 MG/1
5 TABLET ORAL DAILY
Status: DISCONTINUED | OUTPATIENT
Start: 2017-11-14 | End: 2017-11-15

## 2017-11-14 RX ORDER — AMLODIPINE BESYLATE 5 MG/1
5 TABLET ORAL DAILY
Status: DISCONTINUED | OUTPATIENT
Start: 2017-11-15 | End: 2017-11-14

## 2017-11-14 RX ORDER — LABETALOL HYDROCHLORIDE 5 MG/ML
20 INJECTION, SOLUTION INTRAVENOUS
Status: DISCONTINUED | OUTPATIENT
Start: 2017-11-14 | End: 2017-11-14

## 2017-11-14 RX ORDER — PROCHLORPERAZINE MALEATE 5 MG
10 TABLET ORAL EVERY 6 HOURS PRN
Status: DISCONTINUED | OUTPATIENT
Start: 2017-11-14 | End: 2017-11-22 | Stop reason: HOSPADM

## 2017-11-14 RX ORDER — LABETALOL HYDROCHLORIDE 5 MG/ML
10 INJECTION, SOLUTION INTRAVENOUS ONCE
Status: DISCONTINUED | OUTPATIENT
Start: 2017-11-14 | End: 2017-11-14

## 2017-11-14 RX ORDER — ONDANSETRON 4 MG/1
4-8 TABLET, ORALLY DISINTEGRATING ORAL EVERY 6 HOURS PRN
Status: DISCONTINUED | OUTPATIENT
Start: 2017-11-14 | End: 2017-11-22 | Stop reason: HOSPADM

## 2017-11-14 RX ADMIN — CARVEDILOL 25 MG: 25 TABLET, FILM COATED ORAL at 17:44

## 2017-11-14 RX ADMIN — HYDRALAZINE HYDROCHLORIDE 10 MG: 20 INJECTION INTRAMUSCULAR; INTRAVENOUS at 00:25

## 2017-11-14 RX ADMIN — Medication 10 MG: at 01:01

## 2017-11-14 RX ADMIN — Medication 20 MG: at 17:46

## 2017-11-14 RX ADMIN — AMILORIDE HYDROCLORIDE 10 MG: 5 TABLET ORAL at 19:54

## 2017-11-14 RX ADMIN — HYDRALAZINE HYDROCHLORIDE 10 MG: 20 INJECTION INTRAMUSCULAR; INTRAVENOUS at 12:19

## 2017-11-14 RX ADMIN — Medication 20 MG: at 15:45

## 2017-11-14 RX ADMIN — SODIUM CHLORIDE: 9 INJECTION, SOLUTION INTRAVENOUS at 17:44

## 2017-11-14 RX ADMIN — CARVEDILOL 25 MG: 25 TABLET, FILM COATED ORAL at 08:08

## 2017-11-14 RX ADMIN — Medication 20 MG: at 19:42

## 2017-11-14 RX ADMIN — AMLODIPINE BESYLATE 5 MG: 5 TABLET ORAL at 15:44

## 2017-11-14 RX ADMIN — FLUDEOXYGLUCOSE F-18 12.06 MCI.: 500 INJECTION, SOLUTION INTRAVENOUS at 15:00

## 2017-11-14 RX ADMIN — Medication 20 MG: at 21:45

## 2017-11-14 RX ADMIN — Medication 10 MG: at 02:42

## 2017-11-14 RX ADMIN — HYDRALAZINE HYDROCHLORIDE 10 MG: 20 INJECTION INTRAMUSCULAR; INTRAVENOUS at 01:52

## 2017-11-14 RX ADMIN — HYDRALAZINE HYDROCHLORIDE 10 MG: 20 INJECTION INTRAMUSCULAR; INTRAVENOUS at 08:18

## 2017-11-14 ASSESSMENT — VISUAL ACUITY
OU: NORMAL ACUITY

## 2017-11-14 NOTE — PLAN OF CARE
Problem: Patient Care Overview  Goal: Plan of Care/Patient Progress Review  Patient admitted with left sided weakness & headache. Hypertensive, was given PRN labetolol and hydralazine q1h - most recent /88. MDs notified about the need for level of care with the amount of BP meds given, day team will reassess & Coreg increased in the morning. A&Ox4. Neuros include left sided weakness. Low CHO diet. PIV SL. Up SBA and walker. Patient received 40mg IV Lasix last evening, peeing frequently in large quantities through the night. BUE extremities edematous. Denied the need for pain medication. Will continue to monitor.

## 2017-11-14 NOTE — PROGRESS NOTES
Internal Medicine Progress Note  Patient: Boy Aguirre  MRN:  [id number]  November 14, 2017    Interval History:   Overnight patient received lasix and significant diuresis, per chart review patient was net -2.9L yesterday. Had LP at bedside yesterday as well, preliminary reports do not provide additional information. Continued to have elevated blood pressures and was started on coreg. Nursing frequently has had to give prn antihypertensives to meet blood pressure goals. She also finally had a BM.     Today she states she feels tired from having been up urinating all night, but otherwise much improved following diuresis and bowel movement. Also states that her breathing with exertion has dramatically improved. Is currently NPO for PET scan today.     Confirmed with patient; she is very clear that has never had high BP readings; monitored this quite attentively due to diabetes in family.  Attempted to reach [facility] to review previous Cr levels    4 point review of systems is negative.    Pertinent Medications  Current Facility-Administered Medications   Medication     ondansetron (ZOFRAN-ODT) ODT tab 4-8 mg    Or     ondansetron (ZOFRAN) injection 4-8 mg     prochlorperazine (COMPAZINE) injection 10 mg    Or     prochlorperazine (COMPAZINE) tablet 10 mg    Or     prochlorperazine (COMPAZINE) Suppository 25 mg     carvedilol (COREG) tablet 25 mg     senna-docusate (SENOKOT-S;PERICOLACE) 8.6-50 MG per tablet 3 tablet     polyethylene glycol (MIRALAX/GLYCOLAX) Packet 17 g     bisacodyl (DULCOLAX) Suppository 10 mg     naloxone (NARCAN) injection 0.1-0.4 mg     hydrALAZINE (APRESOLINE) injection 10 mg     labetalol (NORMODYNE/TRANDATE) injection 10 mg     lidocaine 1 % 1 mL     lidocaine (LMX4) kit     sodium chloride (PF) 0.9% PF flush 3 mL     sodium chloride (PF) 0.9% PF flush 3 mL     influenza quadrivalent (PF) vacc age 3 yrs and older (FLUZONE or Flulaval) injection 0.5 mL       Exam:  Blood pressure  "169/89, pulse 96, temperature 98.6  F (37  C), temperature source Oral, resp. rate 18, height 1.765 m (5' 9.5\"), weight 90.2 kg (198 lb 13.7 oz), SpO2 97 %.    Intake/Output Summary (Last 24 hours) at 11/14/17 0956  Last data filed at 11/14/17 0659   Gross per 24 hour   Intake              820 ml   Output             5950 ml   Net            -5130 ml     General appearance: Clean shaven, well-groomed today.   Neck: No JVP seen today while sitting upright at 90 degrees.   CV: Mildly tachycardic but regular, no murmurs or gallops.   Pulm/Chest: CTA b/l, however, not significant effort to breathe deeply  Abdomen: Soft, nondistended, mildly tender in R periumbilical area (states this has been present for some time)  Extremities: Warm, still with 1+ pitting edema in lower extremities, dorsum of feet and hands puffy.     Lines/Tubes: None    Labs/Imaging   Results for orders placed or performed during the hospital encounter of 11/11/17 (from the past 24 hour(s))   Glucose CSF: Tube 1   Result Value Ref Range    Glucose CSF 93 (H) 40 - 70 mg/dL   Protein total CSF: Tube 1   Result Value Ref Range    Protein Total CSF 50 15 - 60 mg/dL   Gram stain   Result Value Ref Range    Specimen Description Cerebrospinal fluid     Gram Stain No organisms seen     Gram Stain Rare  WBC'S seen  No PMNs seen       Gram Stain       Gram stain and culture performed on concentrated specimen   CSF Culture Aerobic Bacterial   Result Value Ref Range    Specimen Description Cerebrospinal fluid     Culture Micro Culture negative monitoring continues    Cell count with differential CSF: Tube 1   Result Value Ref Range    WBC CSF 7 (H) 0 - 5 /uL    RBC CSF 51 (H) 0 - 2 /uL    Tube Number 1 #    Color CSF Colorless CLRL^Colorless    Appearance CSF Clear CLER^Clear    % Neutrophils CSF 5 %    % Lymphocytes CSF 37 %    % Mono/Macros CSF 58 %   Cytology non gyn Tube 4   Result Value Ref Range    Copath Report       Patient Name: PARISH JERNIGAN  MR#: " [id number]  Specimen #: [id number]  Collected: 11/13/2017  Received: 11/13/2017  Reported: 11/13/2017 16:42  Ordering Phy(s): CINTHYA SHEETS  Additional Phy(s): KATRINA JARVIS    For improved result formatting, select 'View Enhanced Report Format'  under Linked Documents section.    SPECIMEN/STAIN PROCESS:  Cerebrospinal Fluid       Pap-Cyto x 1, Zapata's stain-cyto x 1    ----------------------------------------------------------------    CYTOLOGIC INTERPRETATION:    Cerebrospinal Fluid:   Negative for malignancy  Red blood cells are noted.  Specimen Adequacy: Satisfactory for evaluation.    I have personally reviewed all specimens and/or slides, including the  listed special stains, and used them with my medical judgement to  determine or confirm the final diagnosis.    Electronically signed out by:  Russ Harry M.D., Physicians    Processed and screened at [facility]    CLINICAL  HISTORY:  The patient is a 62-year-old male who presented with complaint of  left-sided weakness.    The CSF is clear and colorless, WBC 4, RBC 60    ,    GROSS:  Cerebrospinal Fluid: Received 3.2 ml of colorless, clear fluid,  processed as 1 Pap stained cytospin and 1 Zapata stained cytospin.    MICROSCOPIC:  Microscopic examination is performed.    Franck Palomino MD, Cytopathology Fellow, and Russ Hrary MD    CPT Codes:  A: 66192-OBA, 41736-FOFQDCJ    TESTING LAB LOCATION:  [facility], [address]   [id number] [phone]    COLLECTION SITE:  Client:  [facility]  [facility]:  [facility]    Resident  [email or web address]     Cell count with differential CSF: Tube 4   Result Value Ref Range    WBC CSF 4 0 - 5 /uL    RBC CSF 60 (H) 0 - 2 /uL    Tube Number 3 #    Color CSF Colorless CLRL^Colorless    Appearance CSF Clear CLER^Clear   Anaerobic CSF culture   Result  Value Ref Range    Specimen Description Cerebrospinal fluid     Culture Micro Culture negative monitoring continues    Lactic acid level STAT   Result Value Ref Range    Lactic Acid 2.3 (H) 0.7 - 2.0 mmol/L   Basic metabolic panel   Result Value Ref Range    Sodium 140 133 - 144 mmol/L    Potassium 3.8 3.4 - 5.3 mmol/L    Chloride 107 94 - 109 mmol/L    Carbon Dioxide 23 20 - 32 mmol/L    Anion Gap 11 3 - 14 mmol/L    Glucose 163 (H) 70 - 99 mg/dL    Urea Nitrogen 50 (H) 7 - 30 mg/dL    Creatinine 2.17 (H) 0.66 - 1.25 mg/dL    GFR Estimate 31 (L) >60 mL/min/1.7m2    GFR Estimate If Black 38 (L) >60 mL/min/1.7m2    Calcium 8.2 (L) 8.5 - 10.1 mg/dL       IMPRESSION & PLAN  This patient is a 62 year old  male-to-female transgender without known history of medical issues who presented to OSH on 11/7 after a fall and was found to have L sided weakness and R frontal cerebral edema. Hospitalization complicated by intracranial bleed prompting transfer to [facility] for on-going management. Internal medicine consulted for evaluation of elevated creatinine.      # Non-oliguric JALIL on ?CKD  Likely JALIL, CKD cannot be ruled out given lack of known baseline Cr, although patient doesn't have a history of HTN (last bp in 5/17 which was 102/78) or DM2 (but does have HgbA1c of 6.5% in 2010). With FeNa < 0.8%, there is likely a large degree of JALIL, cannot rule out mild degree of ATN. UA unremarkable. Renal US performed which is largely normal, again suggesting against CKD. JALIL is possibly a result of end-organ damage from malignant hypertension, potentiated by a hypervolemic state and on-going issues with hypertension. Peak Cr was 2.5 on 11/12, downtrended to 2.1 on 11/13 and stable overnight with aggressive diuresis.   -- Today mildly hypervolemic but ultimately much improved  -- We note echo placed by primary today team today; will f/u tomorrow.  -- Renal function [id number] today  -- Patient appears to be auto-diuresing  well, will not recommend additional diuretics today but continue to monitor I's and O's and daily weights.  Consider further diuresis after echo  -- Daily BMP's      # Malignant HTN   Per patient, he has never had a diagnosis of HTN before, therefore would presume this is acute onset and may have driven patient's overall presentation. Have reached out to patient's primary care office and am awaiting a call back to confirm the lack of elevated bp readings in the past. If this is confirmed, would pursue a work-up for secondary causes of HTN and consider input from nephrology. For direct management, patient was started on carvedilol 25mg BID on 11/13 and appears to be tolerated this degree of beta-blockade despite being in a mild degree of heart failure, that said, would not increase further at this point.   We note that the primary neurosurgery team has established goals of SBP < 140 in her particular case.  -- For second agent, would start amlodipine 5mg (will order to begin today, 2 pm)  -- Will need close monitoring of blood pressure and may need to come down off of amlodipine once fluid status improves  -- As BP goal is now < 140 for intracranial hemorrhage per neurosurgery, would continue IV antihypertensives as needed. Would discontinue hydralazine due to tachycardia and patient's lack of response to this medication, instead would use labetalol and increase dose and space out frequency for nursing purposes (have implemented these orders for you).   -- appreciate cross cover notification that goals still not being reached; if cannot reach goals, could consider nicard drip; however, this would require ICU tx (neuro ICU likely ideal)    # Non-anion gap metabolic acidosis- resolved  Likely due to functional RTA from JALIL as above. Improving. CO2 today is 23 (from estrella of 13). Continue to monitor.      # R frontal vasogenic edema  # R frontal hemorrhage  # CVA with improving L sided weakness  # Concern for malignant  oncologic process  Neurosurgery is primary team, have consulted neurology. Given imaging studies there is concern for malignancy, also query hemorrhagic CVA from malignant hypertension (although patient doesn't have a history of hypertension and it would be unclear how the hypertension originated). TTE unremarkable for source of emboli. CT CAP concerning for pulmonary process with pleural effusions and pulmonary nodules, although patient has nearly negligible smoking history and no family history of cancer, no history of rheumatologic symptoms. PSA and CEA are negative.   -- PET scheduled for on-going evaluation of potential biopsy sites  -- Defer to neurosurgery, neurology and pulmonology for on-going management    This plan of care was staffed with Dr. Gonzales.    Samantha Kirk,   Internal Medicine PGY-3   [phone]  Consults and Procedures Service    I have seen this patient with the resident teaching team,  examined patient independently, and agree with above note and exam.  Edits made in text.    Jm Gonzales MD  Med-Peds Hospitalist, pager [phone]

## 2017-11-14 NOTE — PROGRESS NOTES
"Neurosurgery Daily Progress Note  11/14/2017    Overnight events/subjective: Hypertension control overnight still challenging. Seen by Neurology and Medicine teams.    O/ /89 (BP Location: Left arm)  Pulse 96  Temp 98.6  F (37  C) (Oral)  Resp 18  Ht 1.765 m (5' 9.5\")  Wt 90.2 kg (198 lb 13.7 oz)  SpO2 97%  BMI 28.94 kg/m2  Exam:   Gen: Laying in bed, not in acute distress  MS: A&Ox3, Speech fluent and conversant   CN: Pupils round and reactive, extraocular movements intact, face symmetric, tongue midline, uvula & palate elevate symmetrically   Motor:     Delt Bi Tri WE WF    R 5 5 5 5 5 5   L 4 5 5 5 5 5     IP Quad Ham DF PF EHL   R 5 5 5 5 5 5   L 4+ 5 5 5 5 5     Sensory: intact to light touch   No drift    Non labored breathing    IMG: reviewed       A/P: Boy Aguirre is a 62 year old with left sided weakness and HA, presenting for OSH with elevated SBPs in the 220s and imaging demonstrating an area of edema with punctate areas of contrast enhancement and diffusion restriction in right frontal lobe. CT CAP demonstrating lymphangitic carcinomatosis.    - Hold steroids.  - MRI completed at OSH, will hold on repeating the study at this time.  - SBP < 140; labetalol PRN to meet the goal.  - Pulmonology requesting PET scan to help and identify a biopsy target.  - Neurology consult: additional serum and CSF labs were added on.  - LP completed: no significant concerns.  - Renal u/s completed: normal  - f/u CEA and PSA (serum): normal  - Will test for immune modulating disorders.  - Medicine consult to assess for etiologies of apparently previously unrecognized CKD and HTN. Please consider the HTN to be unrelated to any possible elevated intracranial pressures  - Regular diet.  - SCDs for DVT ppx  - PO diet for GI ppx    Dispo: No discharge plan established, continuing medical workup at this time. Possible surgical biopsy.      Please contact the neurosurgery resident on call with questions by dialing " [phone], then entering [phone] when prompted

## 2017-11-14 NOTE — PROGRESS NOTES
11/14/17 1109   Quick Adds   Type of Visit Initial Occupational Therapy Evaluation   Living Environment   Lives With alone   Living Arrangements apartment   Home Accessibility bed and bath are not on the first floor   Number of Stairs to Enter Home 30  (apt on 3rd floor, no elevator)   Number of Stairs Within Home 0   Transportation Available car   Self-Care   Dominant Hand right   Usual Activity Tolerance good   Current Activity Tolerance moderate   Regular Exercise yes   Activity/Exercise Type walking   Exercise Amount/Frequency daily   Equipment Currently Used at Home none   Functional Level Prior   Ambulation 0-->independent   Transferring 0-->independent   Toileting 0-->independent   Bathing 0-->independent   Dressing 0-->independent   Eating 0-->independent   Communication 0-->understands/communicates without difficulty   Swallowing 0-->swallows foods/liquids without difficulty   Cognition 0 - no cognition issues reported   Fall history within last six months yes   Number of times patient has fallen within last six months 1   Which of the above functional risks had a recent onset or change? ambulation   General Information   Onset of Illness/Injury or Date of Surgery - Date 11/06/17   Referring Physician Demetrio Gongora MD   Patient/Family Goals Statement to return to home and work   Additional Occupational Profile Info/Pertinent History of Current Problem 62 year old male who presented with Lt sided weakness LL>UL on 11/6 with headache, MRI Brain shows Rt sided Mass enhancing lesion with Lt hemispheric 2 small enhancing lesions   General Observations no activity orders - up w/ SBA per nurse report   Cognitive Status Examination   Orientation orientation to person, place and time   Level of Consciousness alert   Able to Follow Commands WNL/WFL   Personal Safety (Cognitive) WNL/WFL   Memory intact   Attention No deficits were identified   Organization/Problem Solving No deficits were identified    Executive Function No deficits were identified   Cognitive Comment slightly impulsive; no formal screen completed   Visual Perception   Visual Perception Wears glasses  (glasses left at home)   Visual Perception Comments no changes reported   Sensory Examination   Sensory Comments no changes   Pain Assessment   Patient Currently in Pain No   Integumentary/Edema   Integumentary/Edema no deficits were identifed   Posture   Posture not impaired   Range of Motion (ROM)   ROM Comment WNL   Strength   Strength Comments L sided weakness - 4/5   Hand Strength   Hand Strength Comments L weakness    Muscle Tone Assessment   Muscle Tone Quick Adds No deficits were identified   Coordination   Fine Motor Coordination slowed FMC   Mobility   Bed Mobility Comments IND   Transfer Skill: Sit to Stand   Level of [place]: Sit/Stand stand-by assist  (transferred quickly, slightly impulsive )   Assistive Device for Transfer: Sit/Stand standard walker   Transfer Skill: Toilet Transfer   Level of [place]: Toilet stand-by assist   Assistive Device standard walker;grab bars   Balance   Balance Comments used FWW, slowed gait   Toileting   Level of [place]: Toilet independent   Grooming   Level of [place]: Grooming stand-by assist   Instrumental Activities of Daily Living (IADL)   Previous Responsibilities meal prep;housekeeping;laundry;shopping;work;finances;driving   Activities of Daily Living Analysis   Impairments Contributing to Impaired Activities of Daily Living balance impaired;coordination impaired;strength decreased   General Therapy Interventions   Planned Therapy Interventions fine motor coordination training;strengthening;balance training   Clinical Impression   Criteria for Skilled Therapeutic Interventions Met yes, treatment indicated   OT Diagnosis decreased independence with ADL/IADLs   Influenced by the following impairments FMC, strength, balance   Assessment of Occupational Performance 3-5 Performance  "Deficits   Identified Performance Deficits functional mobility, ADLs, IADLs   Clinical Decision Making (Complexity) Low complexity   Therapy Frequency daily   Predicted Duration of Therapy Intervention (days/wks) 2-3 days   Anticipated Discharge Disposition Home with Assist   Risks and Benefits of Treatment have been explained. Yes   Patient, Family & other staff in agreement with plan of care Yes   [facility] TM \"6 Clicks\"   2016, Trustees of [facility], under license to [employer].  All rights reserved.   6 Clicks Short Forms Daily Activity Inpatient Short Form   [facility]  \"6 Clicks\" Daily Activity Inpatient Short Form   1. Putting on and taking off regular lower body clothing? 3 - A Little   2. Bathing (including washing, rinsing, drying)? 3 - A Little   3. Toileting, which includes using toilet, bedpan or urinal? 3 - A Little   4. Putting on and taking off regular upper body clothing? 3 - A Little   5. Taking care of personal grooming such as brushing teeth? 3 - A Little   6. Eating meals? 4 - None   Daily Activity Raw Score (Score out of 24.Lower scores equate to lower levels of function) 19   Total Evaluation Time   Total Evaluation Time (Minutes) 10     "

## 2017-11-14 NOTE — PROGRESS NOTES
Brief Neurology Progress Note    Chart reviewed, patient not seen today.  Cell counts from CSF without evidence for infection and protein was normal.  Elevated glucose likely due to systemic hyperglycemia.  Remains hypertensive but other vitals reassuring.    Pending labs: oligoclonal bands, LIBERTY virus, NMO ab, anti-MOG, paraneoplastic panel.    Agree with obtaining PET scan.  Will continue to follow peripherally for results of requested/pending testing, but please do not hesitate to page with any questions or concerns.  Discussed with attending physician, Dr. Davila.

## 2017-11-14 NOTE — PLAN OF CARE
Problem: Patient Care Overview  Goal: Plan of Care/Patient Progress Review  AVSS except persistent HTN not responding to PRN HT meds. Tolerating activity to bathroom , voids good volume, and walk out in jesús. Seen by team, PRN HTN dosage increased, PET scan at 1330, when returns will resume BP monitoring and treatment.

## 2017-11-14 NOTE — PLAN OF CARE
Problem: Patient Care Overview  Goal: Plan of Care/Patient Progress Review  Discharge Planner OT   Patient plan for discharge: Home   Current status: Eval completed, treatment initiated. Pt IND with bed mobility. He transferred STS, into bathroom, and on/off toilet with SBA and FWW, with slight impulsivity. Pt completed g/h tasks while standing at sink with SBA, holding on to sink for balance. He ambulated in hallway at a household distance at a slow speed. Pt returned to bedside chair.   Barriers to return to prior living situation: Decreased LUE strength, slowed FMC, balance   Recommendations for discharge: Home with assist as needed   Rationale for recommendations: Pt is below baseline for strength, endurance, and activity tolerance, impacting independence with ADL/IADLs       Entered by: Lacey Avalos 11/14/2017 12:34 PM

## 2017-11-14 NOTE — PLAN OF CARE
Problem: Patient Care Overview  Goal: Plan of Care/Patient Progress Review  Outcome: No Change  BP elevated (200s-180s/100s-90s), MDs aware. Given prn labetalol and hydralazine when available. Pt also started on coreg BID. Other vitals stable, sepsis triggered d/t high WBC. LA came back at 2.3, MDs paged. Pt A&Ox4. Neuros intact ex generalized weakness and some slight tremors in LUE. Denies pain/HA. Pt had difficulty voiding, straight cathed x1 this shift for 700ccs. Able to void on own later for 400ccs. After being constipated for a few days pt had one large, loose BM. Reports feeling much better. +2 edema to BUE, given 40mg IV Lasix. On low CHO diet, no appetite. PIV SL. Up SBA w/ walker. Calls appropriately. Continue with POC.

## 2017-11-15 ENCOUNTER — APPOINTMENT (OUTPATIENT)
Dept: PHYSICAL THERAPY | Facility: CLINIC | Age: 62
DRG: 515 | End: 2017-11-15
Attending: NEUROLOGICAL SURGERY
Payer: COMMERCIAL

## 2017-11-15 ENCOUNTER — APPOINTMENT (OUTPATIENT)
Dept: CARDIOLOGY | Facility: CLINIC | Age: 62
DRG: 515 | End: 2017-11-15
Attending: NURSE PRACTITIONER
Payer: COMMERCIAL

## 2017-11-15 ENCOUNTER — APPOINTMENT (OUTPATIENT)
Dept: ULTRASOUND IMAGING | Facility: CLINIC | Age: 62
DRG: 515 | End: 2017-11-15
Attending: PEDIATRICS
Payer: COMMERCIAL

## 2017-11-15 ENCOUNTER — APPOINTMENT (OUTPATIENT)
Dept: OCCUPATIONAL THERAPY | Facility: CLINIC | Age: 62
DRG: 515 | End: 2017-11-15
Attending: NEUROLOGICAL SURGERY
Payer: COMMERCIAL

## 2017-11-15 LAB
ANION GAP SERPL CALCULATED.3IONS-SCNC: 8 MMOL/L (ref 3–14)
BUN SERPL-MCNC: 42 MG/DL (ref 7–30)
CALCIUM SERPL-MCNC: 7.6 MG/DL (ref 8.5–10.1)
CHLORIDE SERPL-SCNC: 111 MMOL/L (ref 94–109)
CO2 SERPL-SCNC: 23 MMOL/L (ref 20–32)
CREAT SERPL-MCNC: 2.09 MG/DL (ref 0.66–1.25)
GFR SERPL CREATININE-BSD FRML MDRD: 33 ML/MIN/1.7M2
GLUCOSE SERPL-MCNC: 133 MG/DL (ref 70–99)
POTASSIUM SERPL-SCNC: 4.1 MMOL/L (ref 3.4–5.3)
POTASSIUM UR-SCNC: 6 MMOL/L
SODIUM SERPL-SCNC: 141 MMOL/L (ref 133–144)

## 2017-11-15 PROCEDURE — 36415 COLL VENOUS BLD VENIPUNCTURE: CPT | Performed by: INTERNAL MEDICINE

## 2017-11-15 PROCEDURE — 97162 PT EVAL MOD COMPLEX 30 MIN: CPT | Mod: GP

## 2017-11-15 PROCEDURE — 25000132 ZZH RX MED GY IP 250 OP 250 PS 637: Performed by: PHYSICIAN ASSISTANT

## 2017-11-15 PROCEDURE — 25000128 H RX IP 250 OP 636: Performed by: PEDIATRICS

## 2017-11-15 PROCEDURE — 90686 IIV4 VACC NO PRSV 0.5 ML IM: CPT | Performed by: NEUROLOGICAL SURGERY

## 2017-11-15 PROCEDURE — 84244 ASSAY OF RENIN: CPT | Performed by: INTERNAL MEDICINE

## 2017-11-15 PROCEDURE — 80048 BASIC METABOLIC PNL TOTAL CA: CPT | Performed by: NEUROLOGICAL SURGERY

## 2017-11-15 PROCEDURE — 82088 ASSAY OF ALDOSTERONE: CPT | Performed by: INTERNAL MEDICINE

## 2017-11-15 PROCEDURE — 36415 COLL VENOUS BLD VENIPUNCTURE: CPT | Performed by: NEUROLOGICAL SURGERY

## 2017-11-15 PROCEDURE — 12000001 ZZH R&B MED SURG/OB UMMC

## 2017-11-15 PROCEDURE — 93975 VASCULAR STUDY: CPT | Mod: TC

## 2017-11-15 PROCEDURE — 97530 THERAPEUTIC ACTIVITIES: CPT | Mod: GO | Performed by: OCCUPATIONAL THERAPIST

## 2017-11-15 PROCEDURE — 25000132 ZZH RX MED GY IP 250 OP 250 PS 637: Performed by: INTERNAL MEDICINE

## 2017-11-15 PROCEDURE — 25000128 H RX IP 250 OP 636: Performed by: NEUROLOGICAL SURGERY

## 2017-11-15 PROCEDURE — 25000128 H RX IP 250 OP 636: Performed by: NURSE PRACTITIONER

## 2017-11-15 PROCEDURE — 93306 TTE W/DOPPLER COMPLETE: CPT | Mod: 26 | Performed by: INTERNAL MEDICINE

## 2017-11-15 PROCEDURE — 93306 TTE W/DOPPLER COMPLETE: CPT

## 2017-11-15 PROCEDURE — 97535 SELF CARE MNGMENT TRAINING: CPT | Mod: GO | Performed by: OCCUPATIONAL THERAPIST

## 2017-11-15 PROCEDURE — 25000128 H RX IP 250 OP 636: Performed by: INTERNAL MEDICINE

## 2017-11-15 PROCEDURE — P9047 ALBUMIN (HUMAN), 25%, 50ML: HCPCS | Performed by: PEDIATRICS

## 2017-11-15 PROCEDURE — 40000193 ZZH STATISTIC PT WARD VISIT

## 2017-11-15 PROCEDURE — 99233 SBSQ HOSP IP/OBS HIGH 50: CPT | Mod: GC | Performed by: PEDIATRICS

## 2017-11-15 PROCEDURE — 97116 GAIT TRAINING THERAPY: CPT | Mod: GP

## 2017-11-15 PROCEDURE — 84133 ASSAY OF URINE POTASSIUM: CPT | Performed by: PEDIATRICS

## 2017-11-15 PROCEDURE — 40000133 ZZH STATISTIC OT WARD VISIT: Performed by: OCCUPATIONAL THERAPIST

## 2017-11-15 PROCEDURE — 25000132 ZZH RX MED GY IP 250 OP 250 PS 637: Performed by: STUDENT IN AN ORGANIZED HEALTH CARE EDUCATION/TRAINING PROGRAM

## 2017-11-15 PROCEDURE — 25000132 ZZH RX MED GY IP 250 OP 250 PS 637: Performed by: NURSE PRACTITIONER

## 2017-11-15 RX ORDER — LABETALOL HYDROCHLORIDE 5 MG/ML
20 INJECTION, SOLUTION INTRAVENOUS
Status: DISCONTINUED | OUTPATIENT
Start: 2017-11-15 | End: 2017-11-19

## 2017-11-15 RX ORDER — ALBUMIN (HUMAN) 12.5 G/50ML
50 SOLUTION INTRAVENOUS ONCE
Status: COMPLETED | OUTPATIENT
Start: 2017-11-15 | End: 2017-11-15

## 2017-11-15 RX ORDER — AMLODIPINE BESYLATE 5 MG/1
5 TABLET ORAL ONCE
Status: COMPLETED | OUTPATIENT
Start: 2017-11-15 | End: 2017-11-15

## 2017-11-15 RX ORDER — AMLODIPINE BESYLATE 10 MG/1
10 TABLET ORAL DAILY
Status: DISCONTINUED | OUTPATIENT
Start: 2017-11-16 | End: 2017-11-15

## 2017-11-15 RX ADMIN — INFLUENZA A VIRUS A/MICHIGAN/45/2015 X-275 (H1N1) ANTIGEN (FORMALDEHYDE INACTIVATED), INFLUENZA A VIRUS A/HONG KONG/4801/2014 X-263B (H3N2) ANTIGEN (FORMALDEHYDE INACTIVATED), INFLUENZA B VIRUS B/PHUKET/3073/2013 ANTIGEN (FORMALDEHYDE INACTIVATED), AND INFLUENZA B VIRUS B/BRISBANE/60/2008 ANTIGEN (FORMALDEHYDE INACTIVATED) 0.5 ML: 15; 15; 15; 15 INJECTION, SUSPENSION INTRAMUSCULAR at 13:30

## 2017-11-15 RX ADMIN — AMILORIDE HYDROCLORIDE 10 MG: 5 TABLET ORAL at 07:48

## 2017-11-15 RX ADMIN — ALBUMIN HUMAN 50 G: 0.25 SOLUTION INTRAVENOUS at 17:45

## 2017-11-15 RX ADMIN — AMLODIPINE BESYLATE 5 MG: 5 TABLET ORAL at 09:22

## 2017-11-15 RX ADMIN — CARVEDILOL 25 MG: 25 TABLET, FILM COATED ORAL at 17:44

## 2017-11-15 RX ADMIN — Medication 20 MG: at 15:12

## 2017-11-15 RX ADMIN — NIFEDIPINE 60 MG: 60 TABLET, EXTENDED RELEASE ORAL at 22:22

## 2017-11-15 RX ADMIN — Medication 20 MG: at 20:18

## 2017-11-15 RX ADMIN — Medication 20 MG: at 16:47

## 2017-11-15 RX ADMIN — AMLODIPINE BESYLATE 5 MG: 5 TABLET ORAL at 07:48

## 2017-11-15 RX ADMIN — SODIUM CHLORIDE: 9 INJECTION, SOLUTION INTRAVENOUS at 07:09

## 2017-11-15 RX ADMIN — Medication 20 MG: at 00:02

## 2017-11-15 RX ADMIN — Medication 20 MG: at 13:30

## 2017-11-15 RX ADMIN — Medication 20 MG: at 21:54

## 2017-11-15 RX ADMIN — CARVEDILOL 25 MG: 25 TABLET, FILM COATED ORAL at 07:48

## 2017-11-15 ASSESSMENT — VISUAL ACUITY
OU: NORMAL ACUITY

## 2017-11-15 NOTE — CONSULTS
Nephrology Initial Consult  November 15, 2017      Boy Aguirre MRN:[id number] YOB: 1955  Date of Admission:11/11/2017  Primary care provider: System, Provider Not In  Requesting physician: Dami Lafleur MD    ASSESSMENT AND RECOMMENDATIONS:   Boy Aguirre is a 62 year old male to female transgender with no known PMH presented with acute left side weakness and rt cerebral edema with punctate hemorrhage associated with HTN he was transferred to [facility] for further management. Nephrology consulted for resistant HTN    Non oliguric JALIL with unknown bl  HTN emergency with cerebral edema and hemorrhage and JALIL  Creatinine increased to 2.3 on admission and is trending down now with improved BP control  JALIL is likely sec to ATN sec to probable chronic HTN changes in the kidney with possible arteriolosclerosis and arteriosleroses    - agree with the current regime for now  - If BP are still not well controlled can switch amlodipine with nifedipine XR 60mg Bid to start  - having a diuretic on board will help agree with amiloride for now  - awaiting aldosterone and renin levels, vinicius with the duplex ? Left renal artery occlusion   - continue to monitor IO and renal functions    Noted the Renal duplex --> if the BP is uncontrolled resistant to treatment, renal functions does not improve/ persistent neurological deficits can consider MRA. There was no disparity in size in the kidneys in the 11/13 [facility]     Electrolytes   Stable  Keep on low Sodium diet <2gm for improved control    Hypervolemic  Responding well with diuresis    AGMA possible with low bicarb on admission  Improved   Was likely sec to JALIL    Recommendations were communicated to primary team     Seen and discussed with Dr. Elena Mayer MD   [phone]      REASON FOR CONSULT: Hypertension    HISTORY OF PRESENT ILLNESS:  Boy Aguirre is a 62 year old male to female transgender with no known PMH presented with acute left side weakness and  rt cerebral edema with punctate hemorrhage associated with HTN he was transferred to [facility] for further management. Nephrology consulted for resistant HTN.  She presented with this acute weakness to the OSH with creatinine of 1.8 and that increased to 2.2 at the time of transfer HTN to 240s SBP. Her renal function peaked creatinine at 2.3 and now have been improving.   Her has an [facility] OSH that showed Rt kidney 9.9cm and left 10cm with no evidence of hydro and he had an ECHO that showed LVH with DD grade I.  Her has been followed by neurosurgery her at [facility]  Her BP have been elevted to 170-180 until addition of carvedilol 25mg Bid 11/13 and yesterday started on amlodipine 5mg and amiloride 10mg. BP are still not at goal but most of the reads are at 160/90s  She feels much improved and lower ext strength has improved    She has been on progesterone for the Tg treatment upto 7 yrs back and has been off them now  DM with HBA1c at 6.5% on no treatment        PAST MEDICAL HISTORY:  Reviewed with patient on 11/15/2017     Past Medical History:   Diagnosis Date     Male-to-female transgender person        Past Surgical History:   Procedure Laterality Date     TONSILLECTOMY      As a child        MEDICATIONS:  PTA Meds  Prior to Admission medications    Not on File      Current Meds    [START ON 11/16/2017] amLODIPine  10 mg Oral Daily     albumin human  50 g Intravenous Once     carvedilol  25 mg Oral BID w/meals     aMILoride  10 mg Oral Daily     senna-docusate  3 tablet Oral or Feeding Tube BID     polyethylene glycol  17 g Oral TID     bisacodyl  10 mg Rectal Once     sodium chloride (PF)  3 mL Intravenous Q8H     Infusion Meds       ALLERGIES:    Allergies   Allergen Reactions     Insulin Swelling     Face and throat swelling       REVIEW OF SYSTEMS:  A comprehensive of systems was negative except as noted above.    SOCIAL HISTORY:   Social History     Social History     Marital status: Single     Spouse name: N/A      "Number of children: N/A     Years of education: N/A     Occupational History     Not on file.     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  Remote former smoking history.  Very rare etoh.     Reviewed with patient       FAMILY MEDICAL HISTORY:   Family History   Problem Relation Age of Onset     Hypertension Mother      Alzheimer Disease Mother      DIABETES Other      CANCER No family hx of      Reviewed with patient     PHYSICAL EXAM:   Temp  Av  F (36.1  C)  Min: 95.3  F (35.2  C)  Max: 98.8  F (37.1  C)      Pulse  Av.9  Min: 74  Max: 98 Resp  Av.9  Min: 12  Max: 34  SpO2  Av.5 %  Min: 85 %  Max: 99 %       BP (!) 149/96  Pulse 74  Temp 98  F (36.7  C) (Oral)  Resp 16  Ht 1.765 m (5' 9.5\")  Wt 90.2 kg (198 lb 13.7 oz)  SpO2 95%  BMI 28.94 kg/m2   Date 11/15/17 0700 - 17 0659   Shift 0901-4871 [id number] 24 Hour Total   I  N  T  A  K  E   P.O. 120   120    Shift Total  (mL/kg) 120  (1.33)   120  (1.33)   O  U  T  P  U  T   Urine 500 500  1000    Shift Total  (mL/kg) 500  (5.54) 500  (5.54)  1000  (11.09)   Weight (kg) 90.2 90.2 90.2 90.2        Admit Weight: 90.2 kg (198 lb 13.7 oz)     GENERAL APPEARANCE: no acute  distress,  awake  EYES: - scleral icterus, pupils equal  Endo: - goiter, - moon facies  Lymphatics: no cervical or supraclavicular LAD  Pulmonary: lungs clear to auscultation with equal breath sounds bilaterally, - clubbing  CV: regular rhythm, normal rate, no rub   - JVD -   - Edema +  GI: soft, nontender, normal bowel sounds  MS: no " evidence of inflammation in joints, no muscle tenderness  : - alma delia  SKIN: no rash, warm, dry, no cyanosis  NEURO: face symmetric, - asterixis     LABS:   CMP  Recent Labs  Lab 11/15/17  0942 11/14/17  1145 11/13/17  0812 11/12/17  1645    140 142 138   POTASSIUM 4.1 3.8 4.4 4.2   CHLORIDE 111* 107 114* 111*   CO2 23 23 18* 16*   ANIONGAP 8 11 10 11   * 163* 158* 213*   BUN 42* 50* 59* 73*   CR 2.09* 2.17* 2.12* 2.39*   GFRESTIMATED 33* 31* 32* 28*   GFRESTBLACK 39* 38* 39* 34*   ALICIA 7.6* 8.2* 7.7* 8.1*   ALBUMIN  --   --  2.8*  --      CBC  Recent Labs  Lab 11/12/17  0740 11/11/17  1313   HGB 11.8* 12.8*   WBC 15.3* 17.9*   RBC 3.96* 4.22*   HCT 37.1* 38.4*   MCV 94 91   MCH 29.8 30.3   MCHC 31.8 33.3   RDW 12.9 12.6    245     INR  Recent Labs  Lab 11/11/17  1313   INR 1.03   PTT 23     ABGNo lab results found in last 7 days.   URINE STUDIES  Recent Labs   Lab Test  11/12/17   1339   COLOR  Light Yellow   APPEARANCE  Clear   URINEGLC  300*   URINEBILI  Negative   URINEKETONE  Negative   SG  1.014   UBLD  Negative   URINEPH  5.0   PROTEIN  Negative   NITRITE  Negative   LEUKEST  Negative   RBCU  0   WBCU  1       IMAGING:  All imaging studies reviewed by me.     Nicole Mayer MD      I, Santino Parker, saw this patient with Dr. Mayer and agree with the findings and plan of care as documented in the note. Per patient, BP had always been in the low normal range; recent marked hypertension and LVH findings, along with renal arterial findings and abnormal GFR compatible with HTN secondary to renal artery stenosis; GFR findings suggests pathology of the (R) kidney without overt renal artery stenosis. Would aim for medical Rx of HTN and aggressive management of other vascular risk factors. Based on multiple clinical trials, would not prioritize revascularization in the first instance. When creatinine levels have stabilized, would consider ACE or ARB, with careful monitoring of renal function.

## 2017-11-15 NOTE — PROGRESS NOTES
"Neurosurgery Daily Progress Note  11/15/2017    Overnight events/subjective: Hypertension control overnight still challenging.     O/ BP (!) 164/95 (BP Location: Left arm)  Pulse 79  Temp 96.8  F (36  C) (Oral)  Resp 16  Ht 1.765 m (5' 9.5\")  Wt 90.2 kg (198 lb 13.7 oz)  SpO2 95%  BMI 28.94 kg/m2  Exam:   Gen: Laying in bed, not in acute distress  MS: A&Ox3, Speech fluent and conversant   CN: Pupils round and reactive, extraocular movements intact, face symmetric, tongue midline, uvula & palate elevate symmetrically   Motor:     Delt Bi Tri WE WF    R 5 5 5 5 5 5   L 4 5 5 5 5 5     IP Quad Ham DF PF EHL   R 5 5 5 5 5 5   L 4+ 5 5 5 5 5     Sensory: intact to light touch   No drift    Non labored breathing    IMG: reviewed       A/P: Boy Aguirre is a 62 year old with left sided weakness and HA, presenting for OSH with elevated SBPs in the 220s and imaging demonstrating an area of edema with punctate areas of contrast enhancement and diffusion restriction in right frontal lobe. CT CAP demonstrating lymphangitic carcinomatosis.    - Hold steroids.  - Repeat MRI w/ and w/o contrast today  - SBP < 140; labetalol PRN to meet the goal.  - PET scan w/o identifiable location of hypermetabolic activity as target. Will discuss findings with Pulmonology.  - Neurology consult: additional serum and CSF labs were added on.  - LP completed: no significant concerns.  - Renal u/s completed: normal  - Cardiac echo: pending  - f/u CEA and PSA (serum): normal  - Medicine consult, appreciate continued recommendations.  - Nephrology consult  - Regular diet  - SCDs for DVT ppx  - PO diet for GI ppx    Dispo: No discharge plan established, continuing medical workup at this time. Possible surgical biopsy.      Please contact the neurosurgery resident on call with questions by dialing * [phone], then entering [phone] when prompted          "

## 2017-11-15 NOTE — PLAN OF CARE
Problem: Patient Care Overview  Goal: Plan of Care/Patient Progress Review  Outcome: No Change  Pt on 6A s/p admission for L sided weakness and HA. HA and L sided weakness resolved. VSS except HTN, not responsive to labetalol Q2hrs, [employer] Cover aware BP unresolved w/prn medications. Per MD, BP <140 a long term goal; will be titrating oral meds today. A&Ox4. Neurologically intact except for generalized weakness. NPO overnight for renal ultrasound this AM. IVMF @75. Voiding without difficulty. No BM this shift. Up SBA w/walker. Denies pain and nausea. Will continue to monitor and follow plan of care.

## 2017-11-15 NOTE — PROGRESS NOTES
"Internal Medicine Progress Note  Patient: Boy Aguirre  MRN:  [id number]  November 15, 2017    Interval History:   Overnight on-going issues with blood pressures not meeting goal of <140 systolically despite q2h labetalol prn. Patient was net negative 2.5L for the last 24 hours. She states overall today she feels 'better', and clarifies this as 'I feel like I've gotten a few more pounds of water off of me'. States she slept better and continues to feel her breathing improve.     4 point review of systems is negative.    Pertinent Medications  Current Facility-Administered Medications   Medication     [START ON 11/16/2017] amLODIPine (NORVASC) tablet 10 mg     labetalol (NORMODYNE/TRANDATE) injection 20 mg     albumin human 25 % injection 50 g     ondansetron (ZOFRAN-ODT) ODT tab 4-8 mg    Or     ondansetron (ZOFRAN) injection 4-8 mg     prochlorperazine (COMPAZINE) injection 10 mg    Or     prochlorperazine (COMPAZINE) tablet 10 mg    Or     prochlorperazine (COMPAZINE) Suppository 25 mg     carvedilol (COREG) tablet 25 mg     aMILoride (MIDAMOR) tablet 10 mg     senna-docusate (SENOKOT-S;PERICOLACE) 8.6-50 MG per tablet 3 tablet     polyethylene glycol (MIRALAX/GLYCOLAX) Packet 17 g     bisacodyl (DULCOLAX) Suppository 10 mg     naloxone (NARCAN) injection 0.1-0.4 mg     lidocaine 1 % 1 mL     lidocaine (LMX4) kit     sodium chloride (PF) 0.9% PF flush 3 mL     sodium chloride (PF) 0.9% PF flush 3 mL       Exam:  Blood pressure (!) 149/96, pulse 74, temperature 98  F (36.7  C), temperature source Oral, resp. rate 16, height 1.765 m (5' 9.5\"), weight 90.2 kg (198 lb 13.7 oz), SpO2 95 %.    Intake/Output Summary (Last 24 hours) at 11/15/17 1625  Last data filed at 11/15/17 1500   Gross per 24 hour   Intake          1593.75 ml   Output             2200 ml   Net          -606.25 ml     General appearance: Pleasant, appears comfortable  Neck: No JVP seen today while sitting upright at 90 degrees.   CV: RRR  Pulm/Chest: " CTA b/l  Abdomen: Soft, nondistended  Extremities: Warm, still with 1+ pitting edema in lower extremities    Labs/Imaging   Results for orders placed or performed during the hospital encounter of 17 (from the past 24 hour(s))   Echocardiogram Complete    Narrative    [id number]  [id number]  [id number]  [id number]^SINAN^DENNISE^RANI CHAVIS           [facility],[place]  Echocardiography Laboratory  [address] [place] [address]     Name: PARISH JERNIGAN  MRN: [id number]  : 1955  Study Date: 11/15/2017 07:01 AM  Age: 62 yrs  Gender: Male  Patient Location: [facility]  Reason For Study: Heart Failure  Ordering Physician: DENNISE MENON  Performed By: Katiana Woo RDCS     BSA: 2.1 m2  Height: 70 in  Weight: 198 lb  BP: 165/85 mmHg  _____________________________________________________________________________  __        Procedure  Complete Portable Echo Adult. Technically difficult study.  _____________________________________________________________________________  __        Interpretation Summary  Borderline (EF 50-55%) reduced left ventricular function is present. EF traced  at 53%. No regional wall motion abnormalities are seen.  Mild concentric wall thickening consistent with left ventricular hypertrophy  is present.Left ventricular diastolic function is indeterminate.  Right ventricular function, chamber size, wall motion, and thickness are  normal.  Pulmonary artery systolic pressure cannot be assessed.  Dilation of the inferior vena cava is present with normal respiratory  variation in diameter. Estimated mean right atrial pressure is 8 mmHg.  No pericardial effusion is present.A left pleural effusion is present.  Previous study not available for comparison.        _____________________________________________________________________________  __        Left Ventricle  Left ventricular size is normal. Mild concentric wall thickening consistent  with left ventricular  hypertrophy is present. Borderline (EF 50-55%) reduced  left ventricular function is present. EF traced at 53%. Left ventricular  diastolic function is indeterminate. No regional wall motion abnormalities are  seen.     Right Ventricle  Right ventricular function, chamber size, wall motion, and thickness are  normal.     Atria  The right atria appears normal. Mild left atrial enlargement is present.        Mitral Valve  The mitral valve is normal. Trace mitral insufficiency is present.     Aortic Valve  Aortic valve is normal in structure and function.     Tricuspid Valve  Trace tricuspid insufficiency is present. The peak velocity of the tricuspid  regurgitant jet is not obtainable. Pulmonary artery systolic pressure cannot  be assessed.     Pulmonic Valve  Trace pulmonic insufficiency is present.     Vessels  The thoracic aorta is normal. Ascending aorta 2.7 cm. Dilation of the inferior  vena cava is present with normal respiratory variation in diameter. Estimated  mean right atrial pressure is 8 mmHg.     Pericardium  No pericardial effusion is present. Prominent epicardial fat is noted.        Miscellaneous  A left pleural effusion is present.     Compared to Previous Study  Previous study not available for comparison.     Attestation  I have personally viewed the imaging and agree with the interpretation and  report as documented by the fellow, Geronimo Myers MD, and/or edited by me.  _____________________________________________________________________________  __     MMode/2D Measurements & Calculations  IVSd: 0.97 cm  LVIDd: 5.2 cm  LVIDs: 3.9 cm  LVPWd: 1.3 cm  FS: 26.2 %  EDV(Teich): 131.4 ml  ESV(Teich): 64.5 ml  LV mass(C)d: 228.3 grams  LV mass(C)dI: 109.9 grams/m2  asc Aorta Diam: 2.7 cm  LVOT diam: 2.4 cm  LVOT area: 4.5 cm2     EF(MOD-bp): 53.3 %  RWT: 0.49  TAPSE: 2.1 cm           Doppler Measurements & Calculations  MV E max roma: 72.8 cm/sec  MV A max roma: 92.4 cm/sec  MV E/A: 0.79  MV dec slope:  527.4 cm/sec2  MV dec time: 0.14 sec  E/E' avg: 10.1  Lateral E/e': 8.3  Medial E/e': 12.0     _____________________________________________________________________________  __           Report approved by: Carlos Correa 11/15/2017 08:24 AM      Basic metabolic panel   Result Value Ref Range    Sodium 141 133 - 144 mmol/L    Potassium 4.1 3.4 - 5.3 mmol/L    Chloride 111 (H) 94 - 109 mmol/L    Carbon Dioxide 23 20 - 32 mmol/L    Anion Gap 8 3 - 14 mmol/L    Glucose 133 (H) 70 - 99 mg/dL    Urea Nitrogen 42 (H) 7 - 30 mg/dL    Creatinine 2.09 (H) 0.66 - 1.25 mg/dL    GFR Estimate 33 (L) >60 mL/min/1.7m2    GFR Estimate If Black 39 (L) >60 mL/min/1.7m2    Calcium 7.6 (L) 8.5 - 10.1 mg/dL   Potassium random urine   Result Value Ref Range    Potassium Urine mmol/L 6 mmol/L   US Renal Complete w Duplex Complete   Result Value Ref Range    Radiologist flags Possible arterial occlusion in the left kidney. (Urgent)     Narrative    Duplex Ultrasound bilateral renal artereis dated 11/15/2017 11:32 AM    Comparison Study: Ultrasound 11/13/2017, PET/CT from 11/14/2017    Clinical Information: Malignant hypertension    Technique: Gray-scale evaluation in addition to Doppler assessment of  the abdominal aorta and renal arteris was performed.     Findings:  Right renal artery:  132 cm/sec at the origin, 46 cm/sec in the mid artery, and 41 cm/sec  at the hilum. Resistive indices in the arcuate arteries vary between  0.55 and 0.68 .    Left renal artery:  Flow in the left renal artery and arcuate arteries is not well seen.  There is left renal venous flow, with normal phasic waveforms.      Impression    Impression:  No definite arterial waveforms are noted in the left renal artery or  left arcuate arteries.  This finding taken alone is concerning for an  arterial occlusion.  However, given the presence of normal venous  waveforms, and recent imaging showing relatively normal function (i.e.  FDG uptake) and size of the left  kidney, an arterial occlusion as a  cause for the patient's malignant hypertension seems less likely.   Consider a non contrast MRA for further evaluation.  Alternatively, a  repeat ultrasound could be considered.      [Urgent Result: Possible arterial occlusion in the left kidney.]    Finding was identified on 11/15/2017 2:22 PM.     Dr. Gonzales was contacted by Dr. Alonso at 11/15/2017 2:33 PM and  verbalized understanding of the urgent finding.            IMPRESSION & PLAN  This patient is a 62 year old  male-to-female transgender without known history of medical issues who presented to OSH on 11/7 after a fall and was found to have L sided weakness and R frontal cerebral edema. Hospitalization complicated by intracranial bleed prompting transfer to [facility] for on-going management. Internal medicine consulted for evaluation of elevated creatinine.       # Non-oliguric JALIL/ATN on ?CKD  Likely JALIL, CKD cannot be ruled out given lack of known baseline Cr, FeNa < 0.8%, there is likely a large degree of JALIL, cannot rule out mild degree of ATN. UA unremarkable. Renal US performed by OSH shows somewhat smaller kidneys, now arguing for some degree of chronic kidney disease. JALIL is possibly a result of end-organ damage from malignant hypertension, potentiated by a hypervolemic state. Now trend of Cr appears to be more in the pattern of ATN than JALIL.  Autodiuresing appropriately.   -- Today mildly hypervolemic but ultimately much improved  -- TTE completed which shows on-going dilation of IVC, but with respiratory variation   -- Renal function [id number] today  -- Continue to monitor I's and O's and daily weights.    -- Nephrology consulted today  -- Daily BMP's       # Malignant HTN   Per patient, he has never had a diagnosis of HTN before, therefore would presume this is acute onset and may have driven patient's overall presentation. Have reached out to patient's primary care office and am awaiting a  call back to confirm the lack of elevated bp readings in the past. However, TTE does demonstrate some LVH which would suggest more chronic hypertension. Nephrology consulted by primary team who notes that this is more likely an unappreciated chronic process rather than acute secondary hypertension.   -- Per neurosurgery, goal BP <140 systolic  -- Cont carvedilol 25mg BID  -- Increased amlodipine to 10mg today (orders implemented for you)  -- Will wait for these medications to increase to steady state prior to adding additional medications at this time  -- Cont labetalol, but increased to 20mg IV q1h prn to meet BP goals  -- Renin/angiotensin levels pending  -- Renal US with dopplers done today, unable to appreciate L sided arterial flow, but given normal venous waveforms, normal PET and otherwise improving/stable renal function, less likely a complete blockage. Discussed with nephrology and would not go ahead with additional imaging at this point unless indicated by significantly abnormal renin/angiotensin levels.      # Non-anion gap metabolic acidosis- resolved  Likely due to functional RTA from JALIL as above. Improving. CO2 today is 23 (from estrella of 13). Continue to monitor.       # R frontal vasogenic edema  # R frontal hemorrhage  # CVA with improving L sided weakness  # Concern for malignant oncologic process  Neurosurgery is primary team, have consulted neurology. Given imaging studies there is concern for malignancy, also query hemorrhagic CVA from malignant hypertension (although patient doesn't have a history of hypertension, see discussion above). TTE unremarkable for source of emboli. CT CAP concerning for pulmonary process with pleural effusions and pulmonary nodules, although patient has nearly negligible smoking history and no family history of cancer, no history of rheumatologic symptoms. PSA and CEA are negative. PET scan unremarkable.   -- Defer to neurosurgery, neurology and pulmonology for  on-going management     This plan of care was staffed with Dr. Gonzales.     Samantha Kirk,   Internal Medicine PGY-3   [phone]  Consults and Procedures Service    I have seen this patient with the resident teaching team,  examined patient independently, and agree with above note and exam.  Appreciate help from nephrology.  Communicated with NS to make sure understand goals (SBP < 140 desired).   Nephrology and NS discussed US findings; I will defer to their decision concerning findings.    Jm Gonzales MD  Med-Peds Hospitalist, pager [phone]

## 2017-11-15 NOTE — PROGRESS NOTES
11/15/17 1548   Quick Adds   Type of Visit Initial PT Evaluation   Living Environment   Lives With alone   Living Arrangements apartment   Home Accessibility bed and bath are not on the first floor   Number of Stairs to Enter Home 30  (apt on 3rd floor, no elevator)   Number of Stairs Within Home 0   Living Environment Comment Is unsure who can be a caregiver once back home.  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  Recently gained 20lbs in 2 mos, would like to lose this weight   Functional Level Prior   Ambulation 0-->independent   Transferring 0-->independent   Toileting 0-->independent   Bathing 0-->independent   Dressing 0-->independent   Eating 0-->independent   Communication 0-->understands/communicates without difficulty   Swallowing 0-->swallows foods/liquids without difficulty   Cognition 0 - no cognition issues reported   Fall history within last six months yes   Number of times patient has fallen within last six months 1   General Information   Onset of Illness/Injury or Date of Surgery - Date 11/11/17   Referring Physician Demetrio Gongora MD   Patient/Family Goals Statement lose 20lbs; return home directly from hospital   Pertinent History of Current Problem (include personal factors and/or comorbidities that impact the POC) 62 year old with new ICH at right posterior frontal lobe. CT CAP demonstrating lymphangitic carcinomatosis.  Difficulty managing BP's requiring meds hourly to maintain SBP <140. PMH:  asthma.   Precautions/Limitations fall precautions   General Observations See vitals flowsheet.  Hypertensive throughout session, however also with  "significant systolic drop  without associated HR response with position change.   General Info Comments Pt is transgender male to female.  Prefers being addressed as \"Priya\"   Cognitive Status Examination   Orientation orientation to person, place and time   Level of Consciousness alert   Follows Commands and Answers Questions 100% of the time   Personal Safety and Judgment intact   Memory intact   Pain Assessment   Patient Currently in Pain No   Integumentary/Edema   Integumentary/Edema Comments 1+ pitting edema B dorsal feet, nonpitting knees to ankles   Posture    Posture Not impaired   Range of Motion (ROM)   ROM Comment WFL BLE   Strength   Strength Comments 5/5 BLE exception 4+/5 L hip flexors and ankle DF   Bed Mobility   Bed Mobility Comments (I)   Transfer Skills   Transfer Comments (I)   Gait   Gait Comments Prefers FWW for stability, and demo's symmetric steps, functional gait speed, good stability.  Without FWW has narrrower VANDA and mild decrease L foot clearance.  No LOB throughout.  CGA on stairs using rail   Balance   Balance Comments Not formally assessed.  Mild trunk sway noted with 180- and 90- degrees turns   Sensory Examination   Sensory Perception no deficits were identified   General Therapy Interventions   Planned Therapy Interventions balance training;gait training;home program guidelines;progressive activity/exercise;neuromuscular re-education   Clinical Impression   Criteria for Skilled Therapeutic Intervention yes, treatment indicated   PT Diagnosis impaired functional mobility   Influenced by the following impairments impaired strength, balance, cardiopulm system   Functional limitations due to impairments decreased (I) gait, stair negotiation community mobility   Clinical Presentation Evolving/Changing   Clinical Presentation Rationale Poorly controlled BP requiring frequent med management.   Clinical Decision Making (Complexity) Moderate complexity   Therapy Frequency` 5 times/week " "  Predicted Duration of Therapy Intervention (days/wks) 11/22/2017   Anticipated Discharge Disposition Home with Assist;Home with Home Therapy   Risk & Benefits of therapy have been explained Yes   Patient, Family & other staff in agreement with plan of care Yes   Clinical Impression Comments Pt will benefit from skilled PT to maximize (I) functional mobility   [facility] AM-PAC  \"6 Clicks\" V.2 Basic Mobility Inpatient Short Form   1. Turning from your back to your side while in a flat bed without using bedrails? 4 - None   2. Moving from lying on your back to sitting on the side of a flat bed without using bedrails? 4 - None   3. Moving to and from a bed to a chair (including a wheelchair)? 4 - None   4. Standing up from a chair using your arms (e.g., wheelchair, or bedside chair)? 4 - None   5. To walk in hospital room? 4 - None   6. Climbing 3-5 steps with a railing? 3 - A Little   Basic Mobility Raw Score (Score out of 24.Lower scores equate to lower levels of function) 23   Total Evaluation Time   Total Evaluation Time (Minutes) 12     "

## 2017-11-15 NOTE — PLAN OF CARE
Problem: Patient Care Overview  Goal: Plan of Care/Patient Progress Review  Discharge Planner OT   Patient plan for discharge: Home  Current status: Pt completed  strength, pinch strength, and 9-hole peg activity to increase strength and FMC. Pt scored below norms for all tasks, indicating decreased UE strength and FMC. He transferred in hallway at a household/community distance to increase activity tolerance with FWW and SBA. Min A required to complete dressing task.   Barriers to return to prior living situation: Generalized deconditioning   Recommendations for discharge: Home with assist  Rationale for recommendations: Pt below baseline for activity tolerance, impacting independence with ADL/IADLs       Entered by: Lacey Avalos 11/15/2017 4:21 PM

## 2017-11-15 NOTE — PROGRESS NOTES
Pulmonary Consult Note    Assessment and Recommendations:  62M with PMHx of asthma present to an OSH after a fall at home on 11/7/17. He denies hitting his head. He complains of worsening left sided weakness (LL>ROGERIO) over the past few weeks. Had an MRI which showed evidence of vasogenic edema in the posterior right frontal lobe. While on the floor, he had onset of severe HA and worsening BP so they repeated a CT of the head which showed a new intracranial hemorrhage. He was transferred to [facility] for further work up and management by neurosurgery.     #b/l pleural effusions - PET scan w/o enhancement throughout thorax   -thoracentesis not recommended at this time   -bronch would not be indicated or helpful at this time    #intracranial hemorrhage  #vasogenic edema of posterior right frontal lobe  -managed by neurology/neurosurgery    Summary:  -PET showed no suspicious FDG uptake in chest and brain  -CSF negative for malignancy  -ECHO showed EF of 50-55%  -On room air, O2 at 95%    10 point review of systems negative, aside from that mentioned above    Past Medical History:   Diagnosis Date     Male-to-female transgender person        Past Surgical History:   Procedure Laterality Date     TONSILLECTOMY      As a child       Family History   Problem Relation Age of Onset     Hypertension Mother      Alzheimer Disease Mother      DIABETES Other      CANCER No family hx of        Social History     Social History     Marital status: Single     Spouse name: N/A     Number of children: N/A     Years of education: N/A     Occupational History     Not on file.     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  Remote former smoking history.  Very rare etoh.       BP (!) 164/95 (BP Location: Left arm)  Pulse 79  Temp 96.8  F (36  C) (Oral)  Resp 16  Ht 1.765 m (5' 9.5\")  Wt 90.2 kg (198 lb 13.7 oz)  SpO2 95%  BMI 28.94 kg/m2  Alert, NAD, Ax4  Anicteric, MM  RRR, no m/g/r  CTA-B, no wheezes or crackles  Ab soft, NTND    Labs: personally reviewed  Cr 2.17, BUN 50, CO2 23, WBC 15.3, Albumin 2.8  LA 2.3, Procalcitonin 0.10, CRP <2.9  BNP 5950  CEA <0.5    Imaging: personally reviewed  PET of Neck, Chest, Abdomen and Pelvis dated 11/14/17  IMPRESSION:   1. There is no abnormal metabolic activity in the region of focal  hyperdensity demonstrated in the high right frontal lobe of the brain  on CT dated 11/11/2017. Slightly decreased metabolic activity in this  region may be secondary to edema versus encephalomalacia, which is  better characterized on dedicated CT.   2. Perilymphatic pulmonary nodularity seen on CT dated 11/11/2017 is  not well appreciated on the current exam, possibly due in part to  differences in technique. There is no associated increased metabolic  activity in the area of this previously demonstrated nodularity.   3. New mixed pulmonary opacities and intralobular septal thickening  along anterior right and left superior lobes with mild associated  increased FDG avidity, maximum SUV 2.55, possibly representing  aspiration/infection. Recommend short-term follow-up.  4. Small bilateral pleural effusions and trace ascites are not  significantly changed from prior exam.  5. Limited evaluation of the remainder of the abdomen is not optimized  given lack of dedicated CT, but does not appear significantly changed  from prior exam    ECHO dated 11/15/17 showed an EF of 50-55%   Venkatesh Adame MD  Pulmonary and Critical Care Medicine  [phone]    "

## 2017-11-15 NOTE — PLAN OF CARE
Problem: Patient Care Overview  Goal: Plan of Care/Patient Progress Review  PT 6A:  Eval completed and treatment initiated.    Discharge Planner PT   Patient plan for discharge: Unsure; wants to go home, but unable to identify consistent caregivers to assist  Current status: SBA all gait and transfers, progressed to walking without FWW.  Occasional scissoring of steps noted. Negotiated x6 steps with 1-2 rails, CGA.    Barriers to return to prior living situation: Mild gait instability, 30steps to access 3rd level apartment  Recommendations for discharge: Home with assist, possibly HHPT pending progress  Rationale for recommendations: Pt progressing himself cautiously with mobility, but seems to be performing better than he realizes.        Entered by: Bonnie Marks 11/15/2017 4:30 PM

## 2017-11-15 NOTE — PLAN OF CARE
Problem: Patient Care Overview  Goal: Plan of Care/Patient Progress Review  Outcome: No Change  Pt HTN majority of shift (SBP 200s-150s/100s-80s), given prn labetalol 20mg q2hrs. Pt also on scheduled Norvasc and coreg. Medicine following and aware of BPs. All other vitals stable. Pt A&Ox4. Neuros intact ex generalized weakness. Pt denies pain/HA. On low consistent CHO diet, fair appetite. MIVF @ 75mL/hr. Up SBA w/ walker. Voiding spont. No BM this shift. Calls appropriately. Unsure of plan at this time. Continue with POC.

## 2017-11-15 NOTE — PLAN OF CARE
Problem: Patient Care Overview  Goal: Individualization & Mutuality  Outcome: No Change  Alert and oriented x 4. Receiving schedule and PRN blood pressure medications. On room air. Denies pain. Had renal ultrasound this morning. NPO for pending  MRI. MRI personnel requested instruction whether the patient can receive contrast for concern of patient's decreased renal function and this message relayed to Charge RN/MD. Plan: Continue care.

## 2017-11-16 ENCOUNTER — APPOINTMENT (OUTPATIENT)
Dept: OCCUPATIONAL THERAPY | Facility: CLINIC | Age: 62
DRG: 515 | End: 2017-11-16
Attending: NEUROLOGICAL SURGERY
Payer: COMMERCIAL

## 2017-11-16 LAB
ALB CSF/SERPL: 8.9 RATIO (ref 0–9)
ALBUMIN CSF-MCNC: 28 MG/DL (ref 0–35)
ALBUMIN SERPL-MCNC: 3160 MG/DL (ref 3500–5200)
ALDOST SERPL-MCNC: 24.9 NG/DL
ANION GAP SERPL CALCULATED.3IONS-SCNC: 9 MMOL/L (ref 3–14)
BUN SERPL-MCNC: 35 MG/DL (ref 7–30)
CALCIUM SERPL-MCNC: 8.2 MG/DL (ref 8.5–10.1)
CHLORIDE SERPL-SCNC: 105 MMOL/L (ref 94–109)
CO2 SERPL-SCNC: 24 MMOL/L (ref 20–32)
CREAT SERPL-MCNC: 1.88 MG/DL (ref 0.66–1.25)
GFR SERPL CREATININE-BSD FRML MDRD: 37 ML/MIN/1.7M2
GLUCOSE SERPL-MCNC: 167 MG/DL (ref 70–99)
IGG CSF-MCNC: 6.1 MG/DL (ref 0–6)
IGG SERPL-MCNC: 1180 MG/DL (ref 768–1632)
IGG SYNTH RATE SER+CSF CALC-MRATE: 3.3 MG/D
IGG/ALB CLEAR SER+CSF-RTO: 0.58 RATIO (ref 0.28–0.66)
IGG/ALB CSF: 0.22 RATIO (ref 0.09–0.25)
OLIGOCLONAL BANDS CSF ELPH-IMP: ABNORMAL
OLIGOCLONAL BANDS CSF ELPH-IMP: NEGATIVE
OLIGOCLONAL BANDS CSF IEF: 0 BANDS (ref 0–1)
POTASSIUM SERPL-SCNC: 3.8 MMOL/L (ref 3.4–5.3)
RESULT: NORMAL
SEND OUTS MISC TEST CODE: NORMAL
SEND OUTS MISC TEST SPECIMEN: NORMAL
SODIUM SERPL-SCNC: 138 MMOL/L (ref 133–144)
TEST NAME: NORMAL

## 2017-11-16 PROCEDURE — 93010 ELECTROCARDIOGRAM REPORT: CPT | Performed by: INTERNAL MEDICINE

## 2017-11-16 PROCEDURE — 40000133 ZZH STATISTIC OT WARD VISIT: Performed by: OCCUPATIONAL THERAPIST

## 2017-11-16 PROCEDURE — 40000065 ZZH STATISTIC EKG NON-CHARGEABLE

## 2017-11-16 PROCEDURE — 25000132 ZZH RX MED GY IP 250 OP 250 PS 637: Performed by: NURSE PRACTITIONER

## 2017-11-16 PROCEDURE — 97110 THERAPEUTIC EXERCISES: CPT | Mod: GO | Performed by: OCCUPATIONAL THERAPIST

## 2017-11-16 PROCEDURE — 99233 SBSQ HOSP IP/OBS HIGH 50: CPT | Performed by: PEDIATRICS

## 2017-11-16 PROCEDURE — 36415 COLL VENOUS BLD VENIPUNCTURE: CPT | Performed by: NEUROLOGICAL SURGERY

## 2017-11-16 PROCEDURE — 97530 THERAPEUTIC ACTIVITIES: CPT | Mod: GO | Performed by: OCCUPATIONAL THERAPIST

## 2017-11-16 PROCEDURE — 25000128 H RX IP 250 OP 636: Performed by: INTERNAL MEDICINE

## 2017-11-16 PROCEDURE — 25000132 ZZH RX MED GY IP 250 OP 250 PS 637: Performed by: STUDENT IN AN ORGANIZED HEALTH CARE EDUCATION/TRAINING PROGRAM

## 2017-11-16 PROCEDURE — 12000008 ZZH R&B INTERMEDIATE UMMC

## 2017-11-16 PROCEDURE — 80048 BASIC METABOLIC PNL TOTAL CA: CPT | Performed by: NEUROLOGICAL SURGERY

## 2017-11-16 PROCEDURE — 97535 SELF CARE MNGMENT TRAINING: CPT | Mod: GO | Performed by: OCCUPATIONAL THERAPIST

## 2017-11-16 PROCEDURE — 25000132 ZZH RX MED GY IP 250 OP 250 PS 637: Performed by: PHYSICIAN ASSISTANT

## 2017-11-16 RX ADMIN — NIFEDIPINE 60 MG: 60 TABLET, EXTENDED RELEASE ORAL at 19:30

## 2017-11-16 RX ADMIN — NIFEDIPINE 60 MG: 60 TABLET, EXTENDED RELEASE ORAL at 07:43

## 2017-11-16 RX ADMIN — SENNOSIDES AND DOCUSATE SODIUM 3 TABLET: 8.6; 5 TABLET ORAL at 07:43

## 2017-11-16 RX ADMIN — CARVEDILOL 25 MG: 25 TABLET, FILM COATED ORAL at 07:43

## 2017-11-16 RX ADMIN — Medication 20 MG: at 04:59

## 2017-11-16 RX ADMIN — CARVEDILOL 25 MG: 25 TABLET, FILM COATED ORAL at 19:30

## 2017-11-16 RX ADMIN — AMILORIDE HYDROCLORIDE 10 MG: 5 TABLET ORAL at 07:43

## 2017-11-16 ASSESSMENT — VISUAL ACUITY
OU: NORMAL ACUITY

## 2017-11-16 NOTE — PLAN OF CARE
Problem: Patient Care Overview  Goal: Plan of Care/Patient Progress Review  Discharge Planner OT   Patient plan for discharge: Home  Current status: Pt completed all transfers with SBA. Pt transferred into cardiac gym at a household distance to increase endurance and activity tolerance. UE theraband exercises completed for strength and endurance purposes. Pt returned to room and completed cognitive task while seated in bedside chair. Pt scored 24/30, indicating potential cognitive impairment. Deficits noted in visuospatial skills, language, abstraction, and short term memory.   Barriers to return to prior living situation: Decreased strength, endurance, and activity tolerance   Recommendations for discharge: Home with assist, possibly HHOT pending progress  Rationale for recommendations: Pt is below baseline for strength and activity tolerance. Daughters live nearby, but unknown how much support they are able to provide.        Entered by: Lacey Avalos 11/16/2017 1:02 PM

## 2017-11-16 NOTE — PLAN OF CARE
Problem: Patient Care Overview  Goal: Plan of Care/Patient Progress Review  Outcome: No Change  Pt on 6A s/p admission for L sided weakness and HA. HA and L sided weakness resolved. VSS except HTN. BP in 120-130s until 0300post-nefidipine admin at 2230, Labetolol given x1. A&Ox4. Neurologically intact except for generalized weakness. Consistent carb diet. PIV SL. Voiding without difficulty. No BM this shift. Up SBA w/walker. Denies pain and nausea. Possible MRI today. Will continue to monitor and follow plan of care.   MD paged to change BP order to Q4hrs.

## 2017-11-16 NOTE — PLAN OF CARE
Problem: Patient Care Overview  Goal: Individualization & Mutuality  Outcome: Improving  Alert and oriented x 4.Vital signs stable, did not require PRN BP medication during morning shift.  On room air.Good oral intake. Voiding adequate amount.There is improvement on kidney function.Fair oral intake. Ambulated in hallways with assist of one person and uses a walker.Sat on chair. Call light within reach. Plan:Continue care.

## 2017-11-16 NOTE — PLAN OF CARE
Problem: Patient Care Overview  Goal: Individualization & Mutuality  Outcome: No Change  Took over pt care at 1900. AVSS except HTN  in 140 s 0 150 s- Lopressor given x2, Medicine and NSG updated. Nifedipine ordered RN just received from pharmacy will in 15 minutes since IV Lopressor has just been given. Per MD continue to monitor q hr to see if effective and update if not able to keep BP below 140. Neuros intact except generalized weakness. PIV SL. VDSP. Declined bowel meds. Up with SBA and walker. Denies pain. On low carb diet. Plan for MRI without contrast. Will continue to monitor and follow with POC.

## 2017-11-16 NOTE — PROGRESS NOTES
[facility] [place] Internal Medicine Progress Note        IMPRESSION & PLAN  This patient is a 62 year old  male-to-female transgender without known history of medical issues who presented to OSH on 11/7 after a fall and was found to have L sided weakness and R frontal cerebral edema. Hospitalization complicated by intracranial bleed prompting transfer to [facility] for on-going management.   Internal medicine consulted for evaluation of elevated creatinine; nephrology now on board.  BP improved        #Pre-operative mgmt: asked to pre-op for potential bx on 11/17:  Pt does have some increased risk factors, including HTN, mild diastolic HF (without current exacerbation, likely at baseline, euvolemic intravscularly with some extra extra-vascular water), and CKD.   However, each appears currently optimized; and there is no obvious testing which is indicated nor management which would reduce risks.  Given importance of current workup/ biopsy, there does not appear to be a clear medical reason to defer procedure.     # Non-oliguric JALIL/ATN on ?CKD  On 11/16, [facility] 1.5 [facility] (likely baseline).   Workup has included  , FeNa < 0.8%, there is likely a large degree of JALIL, cannot rule out mild degree of ATN. UA unremarkable. Renal US performed by OSH shows somewhat smaller kidneys, now arguing for some degree of chronic kidney disease. JALIL is possibly a result of end-organ damage from malignant hypertension, potentiated by a hypervolemic state. Now trend of Cr appears to be more in the pattern of ATN than JALIL.  Autodiuresing appropriately.     -- Renal function slightly improved today  -- Continue to monitor I's and O's and daily weights.    -- Nephrology consulted --- given that BP is now controlled and specialists now on board, we will defer further consultation  -- Daily BMP's       # Malignant HTN   Per patient, he has never had a diagnosis of HTN before (noted to have SBPs in  130s-140s at [facility]), therefore would presume this is acute onset and may have driven patient's overall presentation. H. However, TTE does demonstrate some LVH which would suggest more chronic hypertension. Nephrology consulted by primary team who notes that this is more likely an unappreciated chronic process rather than acute secondary hypertension.   -  Nephrology now managing  -- Per neurosurgery, goal BP <140 systolic  -- Cont carvedilol 25mg BID  -- Increased amlodipine to 10mg today (orders implemented for you)  -- Will wait for these medications to increase to steady state prior to adding additional medications at this time  -- Cont labetalol, but increased to 20mg IV q1h prn to meet BP goals  -- Renin/angiotensin levels pending    # Non-anion gap metabolic acidosis- resolved  Likely due to functional RTA from JALIL as above. Improving. CO2 today is 23 (from estrella of 13). Continue to monitor.       # R frontal vasogenic edema  # R frontal hemorrhage  # CVA with improving L sided weakness  # Concern for malignant oncologic process  Neurosurgery is primary team, have consulted neurology. Given imaging studies there is concern for malignancy, also query hemorrhagic CVA from malignant hypertension (although patient doesn't have a history of hypertension, see discussion above). TTE unremarkable for source of emboli. CT CAP concerning for pulmonary process with pleural effusions and pulmonary nodules, although patient has nearly negligible smoking history and no family history of cancer, no history of rheumatologic symptoms. PSA and CEA are negative. PET scan unremarkable.   -- Defer to neurosurgery, neurology and pulmonology for on-going management        I discussed with NS resident today by phone; given that now pre-oped, and nephrology now on board for same issues we were managing we will sign off.  I can be reached for next 4 days if further consultation needed between 8a-5pm-- [phone].  Other hours or after 5pm  "please call medicine triage doctor.          Able to obtain records from [facility]-- chronic Cr appears to be in 1.5 range  Asked by NS to pre-op patient for potential biopsy.    Reviewed pt's hx, with extra focus on cardiac, pulm, bleeding, anesthesia risk.    No known history of chest pain, known MIs.   HF recently did show mild diastolic dysfcn (outside imaging), nl IVC variability (RAP estimated to be 8) on recent echo.  No PND, orthopnea.  No check pain with six flights of stairs.  Can walk 5 miles without any chest pain or shortness of breath. No known abnl heart rhythyms.    HTN as note din this chart.  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  Had asthma as child, last inhaler appx age 9.  No cough, sputum production    No recent steroids  No personal or family h/o anesethesia complications, sudden death  No episodes of unusual bleeding, including past dental procedures, not sure of circumcision, no usual bleeding           /78 (BP Location: Left arm)  Pulse 74  Temp 97.2  F (36.2  C) (Oral)  Resp 16  Ht 1.765 m (5' 9.5\")  Wt 90.2 kg (198 lb 13.7 oz)  SpO2 94%  BMI 28.94 kg/m2  General appearance: Pleasant, appears comfortable  Neck: No JVP seen today while sitting upright at 45 degrees.   CV: RRR, no r/g/m  Pulm/Chest: CTA b/l  Abdomen: Soft, nondistended  Extremities: Tr pedal edema          Data:     Results for orders placed or performed during the hospital encounter of 11/11/17 (from the past 24 hour(s))   Basic metabolic panel   Result Value Ref Range    Sodium 138 133 - 144 mmol/L    Potassium 3.8 3.4 - 5.3 mmol/L    Chloride 105 94 - 109 mmol/L    Carbon Dioxide 24 20 - 32 mmol/L    Anion Gap 9 3 - 14 mmol/L    Glucose 167 (H) 70 - 99 mg/dL    Urea Nitrogen 35 (H) 7 - 30 mg/dL    Creatinine 1.88 (H) 0.66 - 1.25 mg/dL    GFR Estimate 37 (L) >60 mL/min/1.7m2    GFR Estimate If Black 44 (L) >60 mL/min/1.7m2    Calcium 8.2 (L) 8.5 - 10.1 mg/dL   EKG 12-lead, " tracing only   Result Value Ref Range    Interpretation ECG Click View Image link to view waveform and result    Ekg reviewed; NSR, nl axis, flipped T waves in v1-v3, no BBB,     Jmyanira Gonzales MD

## 2017-11-16 NOTE — PLAN OF CARE
Problem: Patient Care Overview  Goal: Individualization & Mutuality  Outcome: No Change  AVSS. Neuros unchanged: right field cut in R eye and generalized weakness  A&Ox4 except auditory hallucinations pt states that he hears music even when he turns the TV off. Despite education pt believes that these hallucinations have started d/t taking Zyprexa. VDSP. Up SBA. No PIV access, ok per MD order. Plan is for discharge to [facility] and get emergency guardianship. Will continue to monitor and follow with POC.

## 2017-11-16 NOTE — PROGRESS NOTES
Nephrology Progress Note  11/16/2017         Assessment & Recommendations:     Boy Aguirre is a 62 year old male to female transgender with no known PMH presented with acute left side weakness and rt cerebral edema with punctate hemorrhage associated with HTN he was transferred to [facility] for further management. Nephrology consulted for resistant HTN     Non oliguric JALIL with baseline CKD III with creat 1.5   HTN emergency with cerebral edema and hemorrhage and JALIL  Creatinine increased to 2.3 on admission and is trending down now with improved BP control  JALIL is likely sec to ATN sec to probable chronic HTN changes in the kidney with possible arteriolosclerosis and arteriosleroses     - agree with the current regime -- nifedipine 60mg XR Bid , Coreg 25mg Bid , Amiloride 10mg  - pending aldosterone and renin levels  - once JALIL is improved ACE or ARB should be introduced vinicius if he has a unilateral L renal A stenosis to control the effects on the rt kidney  - With the duplex suggestive of left renal artery occlusion , aggressive management of the BP , Antiplatelet agent and statins are an important intervention  - Management will not change if he does have a diagnosis of left renal a stenosis. MRA should be done wo contrast if really needed. Based on multiple clinical trials, there is no evidence indicating in this presentation that a surgical intervention will be helpful more then medical management vinicius in the setting of unmanaged HTN PTA  - continue to monitor IO and renal functions     Electrolytes   Stable  Keep on low Sodium diet <2gm for improved control     Hypervolemic  Responding well with diuresis     AGMA possible with low bicarb on admission  Improved   Was likely sec to JALIL     Recommendations were communicated to primary team     Seen and discussed with Dr Elena Mayer MD   [phone]    Interval History :   In the last 24 hours Boy Aguirre has been better controlled in terms of his BP  Review  "of Systems:   I reviewed the following systems:  GI: - appetite. - nausea or vomiting or diarrhea.   Neuro:  - confusion  Constitutional:  - fever or chills  CV: - dyspnea or edema.  - chest pain.    Physical Exam:   I/O last 3 completed shifts:  In: 2030 [P.O.:2030]  Out: 2500 [Urine:2500]   /77 (BP Location: Left arm)  Pulse 74  Temp 98.8  F (37.1  C) (Oral)  Resp 16  Ht 1.765 m (5' 9.5\")  Wt 90.2 kg (198 lb 13.7 oz)  SpO2 97%  BMI 28.94 kg/m2     GENERAL APPEARANCE: no acute distress  EYES:  - scleral icterus, pupils equal  HENT: mouth without ulcers or lesions  PULM: lungs clear  to auscultation,  bilaterally, equal air movement, no clubbing  CV: regular rhythm, normal rate, no rub     -JVP -     -edema +   GI: soft, non tender, non distended, bowel sounds are +  INTEGUMENT: no cyanosis, - rash  NEURO:  - asterixis       Labs:   All labs reviewed by me  Electrolytes/Renal -   Recent Labs   Lab Test  11/16/17   0854  11/15/17   0942  11/14/17   1145   NA  138  141  140   POTASSIUM  3.8  4.1  3.8   CHLORIDE  105  111*  107   CO2  24  23  23   BUN  35*  42*  50*   CR  1.88*  2.09*  2.17*   GLC  167*  133*  163*   ALICIA  8.2*  7.6*  8.2*       CBC -   Recent Labs   Lab Test  11/12/17   0740  11/11/17   1313   WBC  15.3*  17.9*   HGB  11.8*  12.8*   PLT  225  245       LFTs -   Recent Labs   Lab Test  11/13/17   0812   ALBUMIN  2.8*       Iron Panel - No lab results found.      Imaging:  All imaging studies reviewed by me.     Current Medications:    NIFEdipine ER osmotic  60 mg Oral BID     carvedilol  25 mg Oral BID w/meals     aMILoride  10 mg Oral Daily     senna-docusate  3 tablet Oral or Feeding Tube BID     polyethylene glycol  17 g Oral TID     bisacodyl  10 mg Rectal Once     sodium chloride (PF)  3 mL Intravenous Q8H        Nicole Mayer MD     I, Santino Parker, saw this patient with Dr. Mayer and agree with the findings and plan of care as documented in the note.  "

## 2017-11-16 NOTE — PROGRESS NOTES
Brief Medicine Note    Contacted by RN regarding BP persistently elevated in the 160's/80-90's despite 20 mg IV labetalol q 1-2 hours. Discussed with Neurosurgery and plan to d/c Norvasc, add Nifedipine XR 60 mg BID, per Nephrology recommendations, and monitor BP overnight. No current transfer to ICU for Nicardipine gtt, per Neurosurgery.  Will need to discuss future plan with primary team in am. Plan discussed with Charge RN on 6A.   - Start Nifedipine XR 60 mg BID  - D/c Norvasc  - Continue to monitor    Rossy Wayne PA-C  Internal Medicine Hospitalist Service  [facility]  Pager: [phone]

## 2017-11-17 ENCOUNTER — APPOINTMENT (OUTPATIENT)
Dept: OCCUPATIONAL THERAPY | Facility: CLINIC | Age: 62
DRG: 515 | End: 2017-11-17
Attending: NEUROLOGICAL SURGERY
Payer: COMMERCIAL

## 2017-11-17 ENCOUNTER — APPOINTMENT (OUTPATIENT)
Dept: MRI IMAGING | Facility: CLINIC | Age: 62
DRG: 515 | End: 2017-11-17
Attending: STUDENT IN AN ORGANIZED HEALTH CARE EDUCATION/TRAINING PROGRAM
Payer: COMMERCIAL

## 2017-11-17 ENCOUNTER — APPOINTMENT (OUTPATIENT)
Dept: PHYSICAL THERAPY | Facility: CLINIC | Age: 62
DRG: 515 | End: 2017-11-17
Attending: NEUROLOGICAL SURGERY
Payer: COMMERCIAL

## 2017-11-17 LAB
ANION GAP SERPL CALCULATED.3IONS-SCNC: 8 MMOL/L (ref 3–14)
AQP4 H2O CHANNEL IGG TITR SERPL IF: NORMAL {TITER}
BUN SERPL-MCNC: 29 MG/DL (ref 7–30)
CALCIUM SERPL-MCNC: 8.3 MG/DL (ref 8.5–10.1)
CHLORIDE SERPL-SCNC: 104 MMOL/L (ref 94–109)
CO2 SERPL-SCNC: 26 MMOL/L (ref 20–32)
CREAT SERPL-MCNC: 2 MG/DL (ref 0.66–1.25)
GFR SERPL CREATININE-BSD FRML MDRD: 34 ML/MIN/1.7M2
GLUCOSE SERPL-MCNC: 150 MG/DL (ref 70–99)
INTERPRETATION ECG - MUSE: NORMAL
POTASSIUM SERPL-SCNC: 3.9 MMOL/L (ref 3.4–5.3)
RADIOLOGIST FLAGS: ABNORMAL
RENIN PLAS-CCNC: 9.1 NG/ML/HR
RENIN PLAS-CCNC: 9.3 NG/ML/H
SODIUM SERPL-SCNC: 138 MMOL/L (ref 133–144)

## 2017-11-17 PROCEDURE — 97116 GAIT TRAINING THERAPY: CPT | Mod: GP

## 2017-11-17 PROCEDURE — 40000133 ZZH STATISTIC OT WARD VISIT: Performed by: OCCUPATIONAL THERAPIST

## 2017-11-17 PROCEDURE — A9581 GADOXETATE DISODIUM INJ: HCPCS | Performed by: RADIOLOGY

## 2017-11-17 PROCEDURE — 25500064 ZZH RX 255 OP 636: Performed by: RADIOLOGY

## 2017-11-17 PROCEDURE — 80048 BASIC METABOLIC PNL TOTAL CA: CPT | Performed by: NEUROLOGICAL SURGERY

## 2017-11-17 PROCEDURE — 97110 THERAPEUTIC EXERCISES: CPT | Mod: GP

## 2017-11-17 PROCEDURE — 25000132 ZZH RX MED GY IP 250 OP 250 PS 637: Performed by: PHYSICIAN ASSISTANT

## 2017-11-17 PROCEDURE — 70553 MRI BRAIN STEM W/O & W/DYE: CPT

## 2017-11-17 PROCEDURE — 36415 COLL VENOUS BLD VENIPUNCTURE: CPT | Performed by: NEUROLOGICAL SURGERY

## 2017-11-17 PROCEDURE — 25000132 ZZH RX MED GY IP 250 OP 250 PS 637: Performed by: NURSE PRACTITIONER

## 2017-11-17 PROCEDURE — 97530 THERAPEUTIC ACTIVITIES: CPT | Mod: GO | Performed by: OCCUPATIONAL THERAPIST

## 2017-11-17 PROCEDURE — 97110 THERAPEUTIC EXERCISES: CPT | Mod: GO | Performed by: OCCUPATIONAL THERAPIST

## 2017-11-17 PROCEDURE — 97112 NEUROMUSCULAR REEDUCATION: CPT | Mod: GP

## 2017-11-17 PROCEDURE — 25000132 ZZH RX MED GY IP 250 OP 250 PS 637: Performed by: STUDENT IN AN ORGANIZED HEALTH CARE EDUCATION/TRAINING PROGRAM

## 2017-11-17 PROCEDURE — 25000128 H RX IP 250 OP 636: Performed by: INTERNAL MEDICINE

## 2017-11-17 PROCEDURE — 12000008 ZZH R&B INTERMEDIATE UMMC

## 2017-11-17 PROCEDURE — 40000193 ZZH STATISTIC PT WARD VISIT

## 2017-11-17 RX ADMIN — Medication 20 MG: at 04:50

## 2017-11-17 RX ADMIN — GADOXETATE DISODIUM 10 ML: 181.43 INJECTION, SOLUTION INTRAVENOUS at 18:33

## 2017-11-17 RX ADMIN — CARVEDILOL 25 MG: 25 TABLET, FILM COATED ORAL at 10:40

## 2017-11-17 RX ADMIN — AMILORIDE HYDROCLORIDE 10 MG: 5 TABLET ORAL at 10:41

## 2017-11-17 RX ADMIN — NIFEDIPINE 60 MG: 60 TABLET, EXTENDED RELEASE ORAL at 10:40

## 2017-11-17 RX ADMIN — CARVEDILOL 25 MG: 25 TABLET, FILM COATED ORAL at 20:28

## 2017-11-17 RX ADMIN — NIFEDIPINE 60 MG: 60 TABLET, EXTENDED RELEASE ORAL at 20:28

## 2017-11-17 ASSESSMENT — VISUAL ACUITY
OU: NORMAL ACUITY

## 2017-11-17 NOTE — PLAN OF CARE
Problem: Patient Care Overview  Goal: Plan of Care/Patient Progress Review  Outcome: No Change  Admit for left-sided weakness, HA and fall at home on 11/6, as well as intracranial bleed. VSS ex 's. Labetalol given for MD parameters of BP less then 140 systolic. Neuro intact. Alert and oriented x4. Void without difficulty with good output. Regular diet. PIV SL. Up SBA/walker. Continue to monitor.

## 2017-11-17 NOTE — PLAN OF CARE
Problem: Patient Care Overview  Goal: Plan of Care/Patient Progress Review  Discharge Planner OT   Patient plan for discharge: Home  Current status: Pt SBA for all transfers, requesting FWW d/t fatigue when transferring at a community distance to 7th floor gym. Pt completed seated UE exercises using theraband, increasing number of repetitions from yesterday. Pt reported fatigue with exercises and required a break between exercisers.   Barriers to return to prior living situation: Generalized weakness   Recommendations for discharge: Home with assist as needed   Rationale for recommendations: Pt is below baseline for endurance with functional mobility as well as UE strength and endurance        Entered by: Lacey Avalos 11/17/2017 4:19 PM

## 2017-11-17 NOTE — PLAN OF CARE
Problem: Patient Care Overview  Goal: Plan of Care/Patient Progress Review  Discharge Planner PT   Patient plan for discharge: home  Current status: Pt performs bed mobility and transfers mod indep. He ambulated 500'+ without AD and with supervision. To promote improvement in LE strengthening, coordination and activity tolerance, pt completed 10' on nu-step at level 2 R, maintaining 30 SPM. AVSS. Reporting OMNI of 3 at conclusion. Pt completed 3 x 5 steps with L ascending handrail and supervision. To promote improvement in dynamic stability, pt completed dynamic balance drills, compensating appropriately with no overt LOB.   Barriers to return to prior living situation: medical needs   Recommendations for discharge: home with assist as needed.   Rationale for recommendations: pt approaching baseline of functional mobility. No longer needing AD for ambulation. Did no require hands on assist for mobility today.        Entered by: Niru Gonzalez 11/17/2017 10:37 AM

## 2017-11-17 NOTE — PLAN OF CARE
Problem: Patient Care Overview  Goal: Plan of Care/Patient Progress Review  Outcome: No Change  Admitted on 11/11/17 for left sided weakness, HA and fall at home on 11/6, and intracranial bleed.  VSS except HTN, getting Labetalol PRN for SBP> 140.  A&Ox4. Neuros intact.  Tolerating CHO diet well.  Voiding spont.  PIV S/L.  Up with SBA and walker.  Using call light appropriately. Continue with current POC.

## 2017-11-17 NOTE — PROGRESS NOTES
Labs reviewed , he has borderline aldosterone with normal renin -- In the ideal situation will need to hold all meds and then do this test   BP are better controlled with the medications on board now     Secondary to the acute JALIL with HTN he may have a mild fluctuation of his creatinine before he settles for his baseline     Can continue on nifedipine , coreg and amiloride for now -- he is a good candidate for ACE inhibitor or ARB vinicius if he has a unilateral (L) renal a stenosis     Will sign off since the BP is better controlled now, please call with questions    I, Santino Parker, evaluated this patient with Dr. Mayer and agree with the findings and plan of care as documented in the note.

## 2017-11-17 NOTE — PROGRESS NOTES
"Neurosurgery Daily Progress Note  11/17/2017    Overnight events/subjective: BPs continue to be controlled. Patient has had resolution of left shoulder weakness.    O/ /87  Pulse 77  Temp 98.2  F (36.8  C) (Oral)  Resp 16  Ht 1.765 m (5' 9.5\")  Wt 90.2 kg (198 lb 13.7 oz)  SpO2 94%  BMI 28.94 kg/m2  Exam:   Gen: Laying in bed, not in acute distress  MS: A&Ox3, Speech fluent and conversant   CN: Pupils round and reactive, extraocular movements intact, face symmetric, tongue midline, uvula & palate elevate symmetrically   Motor:     Delt Bi Tri WE WF    R 5 5 5 5 5 5   L 5 5 5 5 5 5     IP Quad Ham DF PF EHL   R 5 5 5 5 5 5   L 5 5 5 5 5 5     Sensory: intact to light touch   No drift    Non labored breathing    IMG: reviewed       A/P: Boy Aguirre is a 62 year old with left sided weakness and HA, presenting for OSH with elevated SBPs in the 220s and imaging demonstrating an area of edema with punctate areas of contrast enhancement and diffusion restriction in right frontal lobe. CT CAP demonstrating lymphangitic carcinomatosis.    - Hold steroids.  - Repeat MRI w/ and w/o contrast; radiology will not perform scan until improved GFR can be demonstrated  - SBP < 140; Coreg, Amlodipine, Nifedipine, Amiloride, Labetalol PRN  - PET scan w/o identifiable location of hypermetabolic activity as target. Pulmonology now does not believe effusions to be malignant in nature and declines tapping for analysis.   - Neurology consult: additional serum and CSF labs were added on. Results pending.  - LP completed: no significant concerns.  - Renal u/s w/ vascular doppler: left renal artery occlusion  - Cardiac echo: EF 50-55%  - f/u CEA and PSA (serum): normal  - Medicine consult, appreciate continued recommendations.  - Nephrology consult: Considering HTN 2/2 renal sclerosis.  - Regular diet  - SCDs for DVT ppx  - PO diet for GI ppx    Dispo: No discharge plan established, continuing medical workup at this time. " Possible surgical biopsy.      Please contact the neurosurgery resident on call with questions by dialing * [phone], then entering [phone] when prompted

## 2017-11-17 NOTE — PLAN OF CARE
"Problem: Patient Care Overview  Goal: Plan of Care/Patient Progress Review  /71 (BP Location: Left arm)  Pulse 80  Temp 98.3  F (36.8  C) (Oral)  Resp 15  Ht 1.765 m (5' 9.5\")  Wt 90.2 kg (198 lb 13.7 oz)  SpO2 95%  BMI 28.94 kg/m2    VSS on RA. A/Ox4. Tolerating a regular diet. Adequate UOP. Improvement on kidney function. Ambulated in hallways with assist of one person and uses a walker per report. Neuro's intact. Will cont to monitor, follow POC and update team with any changes.       "

## 2017-11-18 ENCOUNTER — APPOINTMENT (OUTPATIENT)
Dept: OCCUPATIONAL THERAPY | Facility: CLINIC | Age: 62
DRG: 515 | End: 2017-11-18
Attending: NEUROLOGICAL SURGERY
Payer: COMMERCIAL

## 2017-11-18 ENCOUNTER — ANESTHESIA EVENT (OUTPATIENT)
Dept: SURGERY | Facility: CLINIC | Age: 62
DRG: 515 | End: 2017-11-18
Payer: COMMERCIAL

## 2017-11-18 ENCOUNTER — APPOINTMENT (OUTPATIENT)
Dept: CT IMAGING | Facility: CLINIC | Age: 62
DRG: 515 | End: 2017-11-18
Attending: NEUROLOGICAL SURGERY
Payer: COMMERCIAL

## 2017-11-18 DIAGNOSIS — G93.9 BRAIN LESION: Primary | ICD-10-CM

## 2017-11-18 LAB
ABO + RH BLD: NORMAL
ABO + RH BLD: NORMAL
ANION GAP SERPL CALCULATED.3IONS-SCNC: 7 MMOL/L (ref 3–14)
ANION GAP SERPL CALCULATED.3IONS-SCNC: 7 MMOL/L (ref 3–14)
APTT PPP: 29 SEC (ref 22–37)
BACTERIA SPEC CULT: NO GROWTH
BLD GP AB SCN SERPL QL: NORMAL
BLOOD BANK CMNT PATIENT-IMP: NORMAL
BUN SERPL-MCNC: 32 MG/DL (ref 7–30)
BUN SERPL-MCNC: 34 MG/DL (ref 7–30)
CALCIUM SERPL-MCNC: 8.3 MG/DL (ref 8.5–10.1)
CALCIUM SERPL-MCNC: 8.3 MG/DL (ref 8.5–10.1)
CHLORIDE SERPL-SCNC: 104 MMOL/L (ref 94–109)
CHLORIDE SERPL-SCNC: 104 MMOL/L (ref 94–109)
CO2 SERPL-SCNC: 25 MMOL/L (ref 20–32)
CO2 SERPL-SCNC: 26 MMOL/L (ref 20–32)
CREAT SERPL-MCNC: 2.1 MG/DL (ref 0.66–1.25)
CREAT SERPL-MCNC: 2.16 MG/DL (ref 0.66–1.25)
ERYTHROCYTE [DISTWIDTH] IN BLOOD BY AUTOMATED COUNT: 12.6 % (ref 10–15)
GFR SERPL CREATININE-BSD FRML MDRD: 31 ML/MIN/1.7M2
GFR SERPL CREATININE-BSD FRML MDRD: 32 ML/MIN/1.7M2
GLUCOSE SERPL-MCNC: 145 MG/DL (ref 70–99)
GLUCOSE SERPL-MCNC: 189 MG/DL (ref 70–99)
HCT VFR BLD AUTO: 36.2 % (ref 40–53)
HGB BLD-MCNC: 11.6 G/DL (ref 13.3–17.7)
INR PPP: 1 (ref 0.86–1.14)
MCH RBC QN AUTO: 30.1 PG (ref 26.5–33)
MCHC RBC AUTO-ENTMCNC: 32 G/DL (ref 31.5–36.5)
MCV RBC AUTO: 94 FL (ref 78–100)
PARANEOPLASTIC AB SER QL IF: NORMAL
PLATELET # BLD AUTO: 175 10E9/L (ref 150–450)
POTASSIUM SERPL-SCNC: 4.1 MMOL/L (ref 3.4–5.3)
POTASSIUM SERPL-SCNC: 4.5 MMOL/L (ref 3.4–5.3)
RBC # BLD AUTO: 3.85 10E12/L (ref 4.4–5.9)
SODIUM SERPL-SCNC: 137 MMOL/L (ref 133–144)
SODIUM SERPL-SCNC: 137 MMOL/L (ref 133–144)
SPECIMEN EXP DATE BLD: NORMAL
SPECIMEN SOURCE: NORMAL
WBC # BLD AUTO: 14.4 10E9/L (ref 4–11)

## 2017-11-18 PROCEDURE — 80048 BASIC METABOLIC PNL TOTAL CA: CPT | Performed by: STUDENT IN AN ORGANIZED HEALTH CARE EDUCATION/TRAINING PROGRAM

## 2017-11-18 PROCEDURE — 86901 BLOOD TYPING SEROLOGIC RH(D): CPT | Performed by: STUDENT IN AN ORGANIZED HEALTH CARE EDUCATION/TRAINING PROGRAM

## 2017-11-18 PROCEDURE — 12000008 ZZH R&B INTERMEDIATE UMMC

## 2017-11-18 PROCEDURE — 80048 BASIC METABOLIC PNL TOTAL CA: CPT | Performed by: NEUROLOGICAL SURGERY

## 2017-11-18 PROCEDURE — 70450 CT HEAD/BRAIN W/O DYE: CPT

## 2017-11-18 PROCEDURE — 25000132 ZZH RX MED GY IP 250 OP 250 PS 637: Performed by: STUDENT IN AN ORGANIZED HEALTH CARE EDUCATION/TRAINING PROGRAM

## 2017-11-18 PROCEDURE — 36415 COLL VENOUS BLD VENIPUNCTURE: CPT | Performed by: NEUROLOGICAL SURGERY

## 2017-11-18 PROCEDURE — 36415 COLL VENOUS BLD VENIPUNCTURE: CPT | Performed by: STUDENT IN AN ORGANIZED HEALTH CARE EDUCATION/TRAINING PROGRAM

## 2017-11-18 PROCEDURE — 85610 PROTHROMBIN TIME: CPT | Performed by: STUDENT IN AN ORGANIZED HEALTH CARE EDUCATION/TRAINING PROGRAM

## 2017-11-18 PROCEDURE — 86850 RBC ANTIBODY SCREEN: CPT | Performed by: STUDENT IN AN ORGANIZED HEALTH CARE EDUCATION/TRAINING PROGRAM

## 2017-11-18 PROCEDURE — 25000132 ZZH RX MED GY IP 250 OP 250 PS 637: Performed by: NURSE PRACTITIONER

## 2017-11-18 PROCEDURE — 97110 THERAPEUTIC EXERCISES: CPT | Mod: GO

## 2017-11-18 PROCEDURE — 85730 THROMBOPLASTIN TIME PARTIAL: CPT | Performed by: STUDENT IN AN ORGANIZED HEALTH CARE EDUCATION/TRAINING PROGRAM

## 2017-11-18 PROCEDURE — 40000133 ZZH STATISTIC OT WARD VISIT

## 2017-11-18 PROCEDURE — 25000132 ZZH RX MED GY IP 250 OP 250 PS 637: Performed by: PHYSICIAN ASSISTANT

## 2017-11-18 PROCEDURE — 86900 BLOOD TYPING SEROLOGIC ABO: CPT | Performed by: STUDENT IN AN ORGANIZED HEALTH CARE EDUCATION/TRAINING PROGRAM

## 2017-11-18 PROCEDURE — 85027 COMPLETE CBC AUTOMATED: CPT | Performed by: STUDENT IN AN ORGANIZED HEALTH CARE EDUCATION/TRAINING PROGRAM

## 2017-11-18 RX ORDER — CEFAZOLIN SODIUM 2 G/100ML
2 INJECTION, SOLUTION INTRAVENOUS
Status: COMPLETED | OUTPATIENT
Start: 2017-11-18 | End: 2017-11-19

## 2017-11-18 RX ORDER — CEFAZOLIN SODIUM 1 G
1 VIAL (EA) INJECTION SEE ADMIN INSTRUCTIONS
Status: DISCONTINUED | OUTPATIENT
Start: 2017-11-18 | End: 2017-11-19 | Stop reason: HOSPADM

## 2017-11-18 RX ADMIN — AMILORIDE HYDROCLORIDE 10 MG: 5 TABLET ORAL at 09:53

## 2017-11-18 RX ADMIN — NIFEDIPINE 60 MG: 60 TABLET, EXTENDED RELEASE ORAL at 09:52

## 2017-11-18 RX ADMIN — CARVEDILOL 25 MG: 25 TABLET, FILM COATED ORAL at 18:40

## 2017-11-18 RX ADMIN — CARVEDILOL 25 MG: 25 TABLET, FILM COATED ORAL at 09:52

## 2017-11-18 RX ADMIN — NIFEDIPINE 60 MG: 60 TABLET, EXTENDED RELEASE ORAL at 21:09

## 2017-11-18 ASSESSMENT — VISUAL ACUITY
OU: NORMAL ACUITY

## 2017-11-18 NOTE — PROGRESS NOTES
"SPIRITUAL HEALTH SERVICES  SPIRITUAL ASSESSMENT Progress Note  [facility] ([place]) 6A     PRIMARY FOCUS:     Goals of care    Support for coping    REFERRAL SOURCE: Length of stay visit by unit , following up on on-call 's initial visit.    ILLNESS CIRCUMSTANCES:   Reviewed documentation. Reflective conversation shared with Boy which integrated elements of illness and family narratives.     Context of Serious Illness/Symptom(s) - Mr Aguirre was alert and finishing breakfast, and welcomed a visit. He named feelings of frustration and fear as his stay has progressed, \"I'm starting to be scared.\" Boy describes himself as someone who \"never gets sick.\" He therefore views the present illness as uncharacteristic. He describes himself as action-oriented, and solution-focused. The present situation, waiting for answers, unable to \"do anything,\" elicits frustration. Boy named interests in hiking and mountain climbing, and vocational history including  service ([employer]), [employer], and online [employer].    Resources for Support - Mr Aguirre describes himself as \"not Zoroastrian, but very spiritual.\" His youngest daughter, though not local, is actively engaged in his care: \"My youngest daughter is a spitfire. She has taken charge.\"    DISTRESS:     Emotional/Spiritual/Existential Distress - Mr Aguirre was raised by teachers, and his mother was an . He names this as background for his preference for action and solutions. He is frustrated and is beginning to experience fear around the lack of answers definite answers regarding his illness.    Jainism Distress - Mr Aguirre mentioned mild disappointment that he could not use candles in support of his spiritual practices.    SPIRITUAL/Jain COPING:     Voodoo/Avelina - Mr Aguirre describes himself as \"not Zoroastrian, but very spiritual.\" \"We all pray to the same higher power. We just use different " "names.\"    Emotional/Relational/Existential Connections - Boy has seven daughters and many grandchildren. \"I just want to see my grandchildren grow up.\"    GOALS OF CARE:    Goals of Care - Spiritual and emotional support for coping.    Meaning/Sense-Making - \"I just want to live to see my grandchildren grow up.\"    PLAN: I will follow up for emotional support once per week as Boy remains on 6A. Boy welcomed exploration of his spirituality in a follow up visit.    Shon Winn   Intern  Pager [phone]    "

## 2017-11-18 NOTE — PLAN OF CARE
Problem: Patient Care Overview  Goal: Plan of Care/Patient Progress Review  Outcome: No Change  Admitted on 11/11/17 for left sided weakness, HA and fall at home on 11/6, and intracranial bleed.  VSS/A. A&Ox4. Neuros intact.  Tolerating Reg diet well. Voiding spont.  PIV S/L.  Up with SBA and walker.  Using call light appropriately. Plan:CT Brain this evening.  Continue with current POC.

## 2017-11-18 NOTE — PLAN OF CARE
Problem: Patient Care Overview  Goal: Plan of Care/Patient Progress Review  Outcome: No Change  [id number]: Admitted on 11/11/17 for left sided weakness, HA and fall at home on 11/6, and intracranial bleed.  VSS except HTN, getting Labetalol PRN for SBP> 140.  A&Ox4. Neuros intact.  Tolerating CHO diet well (wants to know why he is on CHO diet, on MD list).  Voiding spont.  PIV S/L.  Up with SBA and walker.  Using call light appropriately. Plan: MRI Brain this evening.  Continue with current POC.

## 2017-11-18 NOTE — PLAN OF CARE
Problem: Patient Care Overview  Goal: Plan of Care/Patient Progress Review  Discharge Planner OT   Patient plan for discharge: home   Current status: Ambulated room to gym with FWW and SBA. Completed UE therex, grading up to 2# weights with good tolerance.   Barriers to return to prior living situation: decreased activity tolerance, weakness  Recommendations for discharge: home with assist  Rationale for recommendations: Pt is below baseline for endurance with functional mobility as well as UE strength and endurance        Entered by: Radha Momin 11/18/2017 3:12 PM

## 2017-11-18 NOTE — PROGRESS NOTES
"Neurosurgery Daily Progress Note  11/18/2017    Overnight events/subjective: BPs continue to be controlled. Patient continues without left shoulder weakness.    O/ /77 (BP Location: Left arm)  Pulse 77  Temp 99  F (37.2  C) (Oral)  Resp 16  Ht 1.765 m (5' 9.5\")  Wt 80.8 kg (178 lb 1.6 oz)  SpO2 95%  BMI 25.92 kg/m2  Exam:   Gen: Laying in bed, not in acute distress  MS: A&Ox3, Speech fluent and conversant   CN: Pupils round and reactive, extraocular movements intact, face symmetric, tongue midline, uvula & palate elevate symmetrically   Motor:     Delt Bi Tri WE WF    R 5 5 5 5 5 5   L 5 5 5 5 5 5     IP Quad Ham DF PF EHL   R 5 5 5 5 5 5   L 5 5 5 5 5 5     Sensory: intact to light touch   No drift    Non labored breathing    IMG: reviewed       A/P: Boy Aguirre is a 62 year old with left sided weakness and HA, presenting for OSH with elevated SBPs in the 220s and imaging demonstrating an area of edema with punctate areas of contrast enhancement and diffusion restriction in right frontal lobe. CT CAP demonstrating lymphangitic carcinomatosis.    - Hold steroids.  - Repeat MRI w/ and w/o contrast completed; suspicious for punctate strokes in an embolic pattern  - SBP < 140; Coreg, Amlodipine, Nifedipine, Amiloride, Labetalol PRN  - PET scan w/o identifiable location of hypermetabolic activity as target. Pulmonology now does not believe effusions to be malignant in nature and declines tapping for analysis.   - Neurology consult: will have stroke team assess today  - LP completed: no significant concerns.  - Renal u/s w/ vascular doppler: left renal artery occlusion  - Cardiac echo: EF 50-55%  - f/u CEA and PSA (serum): normal  - Medicine consult, appreciate continued recommendations.  - Nephrology consult: Considering HTN 2/2 renal sclerosis.  - Regular diet  - SCDs for DVT ppx  - PO diet for GI ppx    Dispo: Evaluation by Neurology stroke team. May require a surgical biopsy.      Please contact " the neurosurgery resident on call with questions by dialing * [phone], then entering [phone] when prompted

## 2017-11-18 NOTE — PLAN OF CARE
Problem: Patient Care Overview  Goal: Plan of Care/Patient Progress Review  Outcome: No Change  Pt admitted with left-sided weakness, HA and high BP s/p fall at home on 11/6. VSS. Denies pain. Neuros intact. CHO diet. Adequate PO intake. PIV SL. Voiding spont. No BM overnight, BS+, passing gas. Up with SBA and walker. MRI completed yesterday. Neurology stroke consulted for small strokes found on MRI. Continue to monitor and follow POC.

## 2017-11-19 ENCOUNTER — ANESTHESIA (OUTPATIENT)
Dept: SURGERY | Facility: CLINIC | Age: 62
DRG: 515 | End: 2017-11-19
Payer: COMMERCIAL

## 2017-11-19 ENCOUNTER — APPOINTMENT (OUTPATIENT)
Dept: CT IMAGING | Facility: CLINIC | Age: 62
DRG: 515 | End: 2017-11-19
Attending: STUDENT IN AN ORGANIZED HEALTH CARE EDUCATION/TRAINING PROGRAM
Payer: COMMERCIAL

## 2017-11-19 PROBLEM — G93.9 BRAIN LESION: Status: ACTIVE | Noted: 2017-11-19

## 2017-11-19 LAB
GLUCOSE BLDC GLUCOMTR-MCNC: 133 MG/DL (ref 70–99)
MRSA DNA SPEC QL NAA+PROBE: NEGATIVE
SPECIMEN SOURCE: NORMAL

## 2017-11-19 PROCEDURE — 88331 PATH CONSLTJ SURG 1 BLK 1SPC: CPT | Performed by: NEUROLOGICAL SURGERY

## 2017-11-19 PROCEDURE — 88313 SPECIAL STAINS GROUP 2: CPT | Performed by: NEUROLOGICAL SURGERY

## 2017-11-19 PROCEDURE — 40000169 ZZH STATISTIC PRE-PROCEDURE ASSESSMENT I: Performed by: NEUROLOGICAL SURGERY

## 2017-11-19 PROCEDURE — 25000565 ZZH ISOFLURANE, EA 15 MIN: Performed by: NEUROLOGICAL SURGERY

## 2017-11-19 PROCEDURE — C1763 CONN TISS, NON-HUMAN: HCPCS | Performed by: NEUROLOGICAL SURGERY

## 2017-11-19 PROCEDURE — 37000008 ZZH ANESTHESIA TECHNICAL FEE, 1ST 30 MIN: Performed by: NEUROLOGICAL SURGERY

## 2017-11-19 PROCEDURE — 88307 TISSUE EXAM BY PATHOLOGIST: CPT | Performed by: NEUROLOGICAL SURGERY

## 2017-11-19 PROCEDURE — 37000009 ZZH ANESTHESIA TECHNICAL FEE, EACH ADDTL 15 MIN: Performed by: NEUROLOGICAL SURGERY

## 2017-11-19 PROCEDURE — 0NH004Z INSERTION OF INTERNAL FIXATION DEVICE INTO SKULL, OPEN APPROACH: ICD-10-PCS | Performed by: NEUROLOGICAL SURGERY

## 2017-11-19 PROCEDURE — 88341 IMHCHEM/IMCYTCHM EA ADD ANTB: CPT | Mod: 26 | Performed by: NEUROLOGICAL SURGERY

## 2017-11-19 PROCEDURE — C1713 ANCHOR/SCREW BN/BN,TIS/BN: HCPCS | Performed by: NEUROLOGICAL SURGERY

## 2017-11-19 PROCEDURE — 36000076 ZZH SURGERY LEVEL 6 EA 15 ADDTL MIN - UMMC: Performed by: NEUROLOGICAL SURGERY

## 2017-11-19 PROCEDURE — 25000128 H RX IP 250 OP 636: Performed by: NURSE ANESTHETIST, CERTIFIED REGISTERED

## 2017-11-19 PROCEDURE — 27810169 ZZH OR IMPLANT GENERAL: Performed by: NEUROLOGICAL SURGERY

## 2017-11-19 PROCEDURE — 25000132 ZZH RX MED GY IP 250 OP 250 PS 637: Performed by: STUDENT IN AN ORGANIZED HEALTH CARE EDUCATION/TRAINING PROGRAM

## 2017-11-19 PROCEDURE — 70450 CT HEAD/BRAIN W/O DYE: CPT

## 2017-11-19 PROCEDURE — 25000132 ZZH RX MED GY IP 250 OP 250 PS 637: Performed by: PHYSICIAN ASSISTANT

## 2017-11-19 PROCEDURE — 71000014 ZZH RECOVERY PHASE 1 LEVEL 2 FIRST HR: Performed by: NEUROLOGICAL SURGERY

## 2017-11-19 PROCEDURE — 88313 SPECIAL STAINS GROUP 2: CPT | Mod: 26 | Performed by: NEUROLOGICAL SURGERY

## 2017-11-19 PROCEDURE — 25000128 H RX IP 250 OP 636: Performed by: INTERNAL MEDICINE

## 2017-11-19 PROCEDURE — 88307 TISSUE EXAM BY PATHOLOGIST: CPT | Mod: 26 | Performed by: NEUROLOGICAL SURGERY

## 2017-11-19 PROCEDURE — 25000128 H RX IP 250 OP 636: Performed by: STUDENT IN AN ORGANIZED HEALTH CARE EDUCATION/TRAINING PROGRAM

## 2017-11-19 PROCEDURE — 88342 IMHCHEM/IMCYTCHM 1ST ANTB: CPT | Mod: 26 | Performed by: NEUROLOGICAL SURGERY

## 2017-11-19 PROCEDURE — 25000128 H RX IP 250 OP 636: Performed by: NEUROLOGICAL SURGERY

## 2017-11-19 PROCEDURE — 20000004 ZZH R&B ICU UMMC

## 2017-11-19 PROCEDURE — 00000146 ZZHCL STATISTIC GLUCOSE BY METER IP

## 2017-11-19 PROCEDURE — 87640 STAPH A DNA AMP PROBE: CPT

## 2017-11-19 PROCEDURE — 88342 IMHCHEM/IMCYTCHM 1ST ANTB: CPT | Performed by: NEUROLOGICAL SURGERY

## 2017-11-19 PROCEDURE — 27210995 ZZH RX 272: Performed by: NEUROLOGICAL SURGERY

## 2017-11-19 PROCEDURE — 00B70ZX EXCISION OF CEREBRAL HEMISPHERE, OPEN APPROACH, DIAGNOSTIC: ICD-10-PCS | Performed by: NEUROLOGICAL SURGERY

## 2017-11-19 PROCEDURE — 25000125 ZZHC RX 250: Performed by: NURSE ANESTHETIST, CERTIFIED REGISTERED

## 2017-11-19 PROCEDURE — 36000074 ZZH SURGERY LEVEL 6 1ST 30 MIN - UMMC: Performed by: NEUROLOGICAL SURGERY

## 2017-11-19 PROCEDURE — 87641 MR-STAPH DNA AMP PROBE: CPT

## 2017-11-19 PROCEDURE — 27210794 ZZH OR GENERAL SUPPLY STERILE: Performed by: NEUROLOGICAL SURGERY

## 2017-11-19 PROCEDURE — C9399 UNCLASSIFIED DRUGS OR BIOLOG: HCPCS | Performed by: NURSE ANESTHETIST, CERTIFIED REGISTERED

## 2017-11-19 PROCEDURE — 25000125 ZZHC RX 250: Performed by: NEUROLOGICAL SURGERY

## 2017-11-19 PROCEDURE — 88331 PATH CONSLTJ SURG 1 BLK 1SPC: CPT | Mod: 26 | Performed by: NEUROLOGICAL SURGERY

## 2017-11-19 PROCEDURE — 25000128 H RX IP 250 OP 636: Performed by: ANESTHESIOLOGY

## 2017-11-19 PROCEDURE — 88341 IMHCHEM/IMCYTCHM EA ADD ANTB: CPT | Performed by: NEUROLOGICAL SURGERY

## 2017-11-19 DEVICE — IMP PLATE SYN STR DOG BONE LOW PROFILE 2H TI 421.502: Type: IMPLANTABLE DEVICE | Site: CRANIAL | Status: FUNCTIONAL

## 2017-11-19 DEVICE — IMP BUR HOLE COVER 17MM LOW PROFILE TI 421.527: Type: IMPLANTABLE DEVICE | Site: CRANIAL | Status: FUNCTIONAL

## 2017-11-19 DEVICE — IMP SCR SYN MATRIX LOW PRO 1.5X04MM SELF DRILL 04.503.104.01: Type: IMPLANTABLE DEVICE | Site: CRANIAL | Status: FUNCTIONAL

## 2017-11-19 DEVICE — GRAFT DURAGEN 2X2" ID220: Type: IMPLANTABLE DEVICE | Site: CRANIAL | Status: FUNCTIONAL

## 2017-11-19 RX ORDER — MANNITOL 20 G/100ML
INJECTION, SOLUTION INTRAVENOUS PRN
Status: DISCONTINUED | OUTPATIENT
Start: 2017-11-19 | End: 2017-11-19

## 2017-11-19 RX ORDER — FENTANYL CITRATE 50 UG/ML
INJECTION, SOLUTION INTRAMUSCULAR; INTRAVENOUS PRN
Status: DISCONTINUED | OUTPATIENT
Start: 2017-11-19 | End: 2017-11-19

## 2017-11-19 RX ORDER — ACETAMINOPHEN 325 MG/1
650 TABLET ORAL EVERY 4 HOURS PRN
Status: DISCONTINUED | OUTPATIENT
Start: 2017-11-19 | End: 2017-11-22 | Stop reason: HOSPADM

## 2017-11-19 RX ORDER — ONDANSETRON 4 MG/1
4 TABLET, ORALLY DISINTEGRATING ORAL EVERY 30 MIN PRN
Status: DISCONTINUED | OUTPATIENT
Start: 2017-11-19 | End: 2017-11-19 | Stop reason: HOSPADM

## 2017-11-19 RX ORDER — SODIUM CHLORIDE, SODIUM LACTATE, POTASSIUM CHLORIDE, CALCIUM CHLORIDE 600; 310; 30; 20 MG/100ML; MG/100ML; MG/100ML; MG/100ML
INJECTION, SOLUTION INTRAVENOUS CONTINUOUS PRN
Status: DISCONTINUED | OUTPATIENT
Start: 2017-11-19 | End: 2017-11-19

## 2017-11-19 RX ORDER — LABETALOL HYDROCHLORIDE 5 MG/ML
10-40 INJECTION, SOLUTION INTRAVENOUS EVERY 10 MIN PRN
Status: DISCONTINUED | OUTPATIENT
Start: 2017-11-19 | End: 2017-11-22 | Stop reason: HOSPADM

## 2017-11-19 RX ORDER — OXYCODONE HYDROCHLORIDE 5 MG/1
5 TABLET ORAL EVERY 4 HOURS PRN
Status: DISCONTINUED | OUTPATIENT
Start: 2017-11-19 | End: 2017-11-22 | Stop reason: HOSPADM

## 2017-11-19 RX ORDER — BUPIVACAINE HYDROCHLORIDE AND EPINEPHRINE 2.5; 5 MG/ML; UG/ML
INJECTION, SOLUTION EPIDURAL; INFILTRATION; INTRACAUDAL; PERINEURAL PRN
Status: DISCONTINUED | OUTPATIENT
Start: 2017-11-19 | End: 2017-11-19 | Stop reason: HOSPADM

## 2017-11-19 RX ORDER — ONDANSETRON 2 MG/ML
4 INJECTION INTRAMUSCULAR; INTRAVENOUS EVERY 30 MIN PRN
Status: DISCONTINUED | OUTPATIENT
Start: 2017-11-19 | End: 2017-11-19 | Stop reason: HOSPADM

## 2017-11-19 RX ORDER — SODIUM CHLORIDE, SODIUM LACTATE, POTASSIUM CHLORIDE, CALCIUM CHLORIDE 600; 310; 30; 20 MG/100ML; MG/100ML; MG/100ML; MG/100ML
INJECTION, SOLUTION INTRAVENOUS CONTINUOUS
Status: DISCONTINUED | OUTPATIENT
Start: 2017-11-19 | End: 2017-11-19

## 2017-11-19 RX ORDER — FENTANYL CITRATE 50 UG/ML
25-50 INJECTION, SOLUTION INTRAMUSCULAR; INTRAVENOUS
Status: DISCONTINUED | OUTPATIENT
Start: 2017-11-19 | End: 2017-11-19 | Stop reason: HOSPADM

## 2017-11-19 RX ORDER — LIDOCAINE 40 MG/G
CREAM TOPICAL
Status: DISCONTINUED | OUTPATIENT
Start: 2017-11-19 | End: 2017-11-22 | Stop reason: HOSPADM

## 2017-11-19 RX ORDER — HYDROMORPHONE HYDROCHLORIDE 1 MG/ML
.3-.5 INJECTION, SOLUTION INTRAMUSCULAR; INTRAVENOUS; SUBCUTANEOUS EVERY 5 MIN PRN
Status: DISCONTINUED | OUTPATIENT
Start: 2017-11-19 | End: 2017-11-19

## 2017-11-19 RX ORDER — SODIUM CHLORIDE, SODIUM LACTATE, POTASSIUM CHLORIDE, CALCIUM CHLORIDE 600; 310; 30; 20 MG/100ML; MG/100ML; MG/100ML; MG/100ML
INJECTION, SOLUTION INTRAVENOUS CONTINUOUS
Status: DISCONTINUED | OUTPATIENT
Start: 2017-11-19 | End: 2017-11-19 | Stop reason: HOSPADM

## 2017-11-19 RX ORDER — LIDOCAINE HYDROCHLORIDE 20 MG/ML
INJECTION, SOLUTION INFILTRATION; PERINEURAL PRN
Status: DISCONTINUED | OUTPATIENT
Start: 2017-11-19 | End: 2017-11-19

## 2017-11-19 RX ORDER — HYDRALAZINE HYDROCHLORIDE 20 MG/ML
10-20 INJECTION INTRAMUSCULAR; INTRAVENOUS EVERY 30 MIN PRN
Status: DISCONTINUED | OUTPATIENT
Start: 2017-11-19 | End: 2017-11-22 | Stop reason: HOSPADM

## 2017-11-19 RX ORDER — ACETAMINOPHEN 650 MG/1
650 SUPPOSITORY RECTAL EVERY 4 HOURS PRN
Status: DISCONTINUED | OUTPATIENT
Start: 2017-11-19 | End: 2017-11-22 | Stop reason: HOSPADM

## 2017-11-19 RX ORDER — ONDANSETRON 2 MG/ML
INJECTION INTRAMUSCULAR; INTRAVENOUS PRN
Status: DISCONTINUED | OUTPATIENT
Start: 2017-11-19 | End: 2017-11-19

## 2017-11-19 RX ORDER — PROPOFOL 10 MG/ML
INJECTION, EMULSION INTRAVENOUS PRN
Status: DISCONTINUED | OUTPATIENT
Start: 2017-11-19 | End: 2017-11-19

## 2017-11-19 RX ORDER — SODIUM CHLORIDE 9 MG/ML
INJECTION, SOLUTION INTRAVENOUS CONTINUOUS
Status: ACTIVE | OUTPATIENT
Start: 2017-11-19 | End: 2017-11-19

## 2017-11-19 RX ORDER — LABETALOL HYDROCHLORIDE 5 MG/ML
10 INJECTION, SOLUTION INTRAVENOUS
Status: COMPLETED | OUTPATIENT
Start: 2017-11-19 | End: 2017-11-19

## 2017-11-19 RX ORDER — DEXAMETHASONE SODIUM PHOSPHATE 4 MG/ML
INJECTION, SOLUTION INTRA-ARTICULAR; INTRALESIONAL; INTRAMUSCULAR; INTRAVENOUS; SOFT TISSUE PRN
Status: DISCONTINUED | OUTPATIENT
Start: 2017-11-19 | End: 2017-11-19

## 2017-11-19 RX ADMIN — HYDRALAZINE HYDROCHLORIDE 10 MG: 20 INJECTION INTRAMUSCULAR; INTRAVENOUS at 14:14

## 2017-11-19 RX ADMIN — FENTANYL CITRATE 100 MCG: 50 INJECTION, SOLUTION INTRAMUSCULAR; INTRAVENOUS at 08:14

## 2017-11-19 RX ADMIN — HYDRALAZINE HYDROCHLORIDE 20 MG: 20 INJECTION INTRAMUSCULAR; INTRAVENOUS at 20:49

## 2017-11-19 RX ADMIN — ONDANSETRON 4 MG: 2 INJECTION INTRAMUSCULAR; INTRAVENOUS at 10:01

## 2017-11-19 RX ADMIN — ACETAMINOPHEN 650 MG: 325 TABLET, FILM COATED ORAL at 14:14

## 2017-11-19 RX ADMIN — MIDAZOLAM HYDROCHLORIDE 2 MG: 1 INJECTION, SOLUTION INTRAMUSCULAR; INTRAVENOUS at 08:05

## 2017-11-19 RX ADMIN — LIDOCAINE HYDROCHLORIDE 100 MG: 20 INJECTION, SOLUTION INFILTRATION; PERINEURAL at 08:14

## 2017-11-19 RX ADMIN — HYDRALAZINE HYDROCHLORIDE 10 MG: 20 INJECTION INTRAMUSCULAR; INTRAVENOUS at 18:59

## 2017-11-19 RX ADMIN — Medication 5 MG: at 11:03

## 2017-11-19 RX ADMIN — DEXAMETHASONE SODIUM PHOSPHATE 8 MG: 4 INJECTION, SOLUTION INTRA-ARTICULAR; INTRALESIONAL; INTRAMUSCULAR; INTRAVENOUS; SOFT TISSUE at 08:45

## 2017-11-19 RX ADMIN — Medication 10 MG: at 13:15

## 2017-11-19 RX ADMIN — Medication 10 MG: at 17:55

## 2017-11-19 RX ADMIN — SUGAMMADEX 150 MG: 100 INJECTION, SOLUTION INTRAVENOUS at 10:05

## 2017-11-19 RX ADMIN — Medication 10 MG: at 13:32

## 2017-11-19 RX ADMIN — SODIUM CHLORIDE, POTASSIUM CHLORIDE, SODIUM LACTATE AND CALCIUM CHLORIDE: 600; 310; 30; 20 INJECTION, SOLUTION INTRAVENOUS at 08:05

## 2017-11-19 RX ADMIN — Medication 10 MG: at 22:49

## 2017-11-19 RX ADMIN — HYDRALAZINE HYDROCHLORIDE 10 MG: 20 INJECTION INTRAMUSCULAR; INTRAVENOUS at 15:00

## 2017-11-19 RX ADMIN — HYDRALAZINE HYDROCHLORIDE 10 MG: 20 INJECTION INTRAMUSCULAR; INTRAVENOUS at 18:24

## 2017-11-19 RX ADMIN — Medication 10 MG: at 11:34

## 2017-11-19 RX ADMIN — Medication 5 MG: at 10:52

## 2017-11-19 RX ADMIN — CARVEDILOL 25 MG: 25 TABLET, FILM COATED ORAL at 18:11

## 2017-11-19 RX ADMIN — HYDRALAZINE HYDROCHLORIDE 5 MG: 20 INJECTION INTRAMUSCULAR; INTRAVENOUS at 11:54

## 2017-11-19 RX ADMIN — NIFEDIPINE 60 MG: 60 TABLET, EXTENDED RELEASE ORAL at 21:26

## 2017-11-19 RX ADMIN — HYDRALAZINE HYDROCHLORIDE 20 MG: 20 INJECTION INTRAMUSCULAR; INTRAVENOUS at 16:04

## 2017-11-19 RX ADMIN — Medication 10 MG: at 17:39

## 2017-11-19 RX ADMIN — ROCURONIUM BROMIDE 50 MG: 10 INJECTION INTRAVENOUS at 08:14

## 2017-11-19 RX ADMIN — CEFAZOLIN SODIUM 2 G: 2 INJECTION, SOLUTION INTRAVENOUS at 08:35

## 2017-11-19 RX ADMIN — FENTANYL CITRATE 50 MCG: 50 INJECTION, SOLUTION INTRAMUSCULAR; INTRAVENOUS at 10:03

## 2017-11-19 RX ADMIN — Medication 10 MG: at 11:19

## 2017-11-19 RX ADMIN — PROPOFOL 150 MG: 10 INJECTION, EMULSION INTRAVENOUS at 08:14

## 2017-11-19 RX ADMIN — MANNITOL 25 G: 20 INJECTION, SOLUTION INTRAVENOUS at 09:17

## 2017-11-19 RX ADMIN — FENTANYL CITRATE 25 MCG: 50 INJECTION, SOLUTION INTRAMUSCULAR; INTRAVENOUS at 10:47

## 2017-11-19 ASSESSMENT — VISUAL ACUITY
OU: NORMAL ACUITY

## 2017-11-19 ASSESSMENT — PAIN DESCRIPTION - DESCRIPTORS: DESCRIPTORS: HEADACHE;DISCOMFORT

## 2017-11-19 NOTE — CONSULTS
[facility], [place]      Neurology Stroke Consult    Patient Name: Boy Aguirre  : 1955 MRN#: [id number]    STROKE DATA    Stroke Code:  Stroke code not activated.  Time patient seen:  2017  Last known normal (pt's baseline):  17      TPA treatment:  Not given due to outside the time window.     National Institutes of Health Stroke Scale (at presentation)  NIHSS done at:  time patient seen      Score    Level of consciousness:  (0)   Alert, keenly responsive     LOC questions:  (0)   Answers both questions correctly    LOC commands:  (0)   Performs both tasks correctly    Best gaze:  (0)   Normal    Visual:  (0)   No visual loss    Facial palsy:  (0)   Normal symmetrical movements    Motor arm (left):  (0)   No drift    Motor arm (right):  (0)   No drift    Motor leg (left):  (0)   No drift    Motor leg (right):  (0)   No drift    Limb ataxia:  (0)   Absent    Sensory:  (0)   Normal- no sensory loss    Best language:  (0)   Normal- no aphasia    Dysarthria:  (0)   Normal    Extinction and inattention:  (0)   No abnormality        NIHSS Total Score:  0        Dysphagia Screen  Time of screenin2017  Screening results: Passed screening, no dysarthria - Regular Diet with thin liquids     ASSESSMENT & RECOMMENDATIONS     The patient was seen for MRI findings of acute stroke.  The differential diagnosis includes stroke.     Impression: Acute ischemic strokes in multiple different vascular territories in the setting of R frontal lobe edema. Amyloid-beta related angiitis should be considered. Also on the differential would be cardioembolic source of infarcts.     Recommendations:  Acute Ischemic Stroke (without tPA) Plan  - Neurochecks  - Euthermia, Euglycemia  - Telemetry, EKG  - Bedside Glucose Monitoring  - PT/OT/SLP  - Stroke Education  - Depression Screen  - Apnea Screen   - Consider biopsy of R frontal edema for definitive diagnosis  - Lipid panel   -  A1c 6.7%  - TTE completed: EF 50-55, LV hypertrophy    Prophylaxis          For VTE Prevention:  - pneumatic compression device     The patient is currently admitted under care of neurosurgery team.    BRAN Aguirre is a 62 year old male with HTN and CKD III who was admitted to neurosurgery 11/12/17 for left-sided weakness with fall. Initially admitted to OSH and CT scan was concerning for suspected bleed along right frontal lobe. He reported worsening of his strength on the left over the preceding weeks. Repeat MRI scans have shown edema in right frontal lobe. MRI was completed on 11/17 which demonstrated new acute infarcts in bilateral hemispheres.    Pertinent Past Medical/Surgical HistoryPast Medical History:   Diagnosis Date     Male-to-female transgender person        Past Surgical History:   Procedure Laterality Date     TONSILLECTOMY      As a child       Medications:   Current Facility-Administered Medications   Medication     ceFAZolin (ANCEF) intermittent infusion 2 g in 100 mL dextrose PRE-MIX     ceFAZolin (ANCEF) 1g in 10 mL SWFI premix for IVP     labetalol (NORMODYNE/TRANDATE) injection 20 mg     NIFEdipine ER (ADALAT CC) 24 hr tablet 60 mg     ondansetron (ZOFRAN-ODT) ODT tab 4-8 mg    Or     ondansetron (ZOFRAN) injection 4-8 mg     prochlorperazine (COMPAZINE) injection 10 mg    Or     prochlorperazine (COMPAZINE) tablet 10 mg    Or     prochlorperazine (COMPAZINE) Suppository 25 mg     carvedilol (COREG) tablet 25 mg     aMILoride (MIDAMOR) tablet 10 mg     senna-docusate (SENOKOT-S;PERICOLACE) 8.6-50 MG per tablet 3 tablet     polyethylene glycol (MIRALAX/GLYCOLAX) Packet 17 g     bisacodyl (DULCOLAX) Suppository 10 mg     naloxone (NARCAN) injection 0.1-0.4 mg     lidocaine 1 % 1 mL     lidocaine (LMX4) kit     sodium chloride (PF) 0.9% PF flush 3 mL     sodium chloride (PF) 0.9% PF flush 3 mL   .    Allergies:   Allergies   Allergen Reactions     Insulin Swelling     Face and throat  swelling   .    Family History:   Family History   Problem Relation Age of Onset     Hypertension Mother      Alzheimer Disease Mother      DIABETES Other      CANCER No family hx of    .    Social History:   Social History   Substance Use Topics     Smoking status: Former Smoker     Packs/day: 1.00     Years: 5.00     Types: Cigarettes     Smokeless tobacco: Former User     Quit date: 11/13/1976      Comment: Smoked from age [age], but not since     Alcohol use Yes      Comment: Very rare beer   .    Tobacco use: Former smoker, last smoked 1976    ROS:  The 5 point Review of Systems is negative other than noted in the HPI or here.     PHYSICAL EXAMINATION  Vital Signs:  B/P: 121/73,  T: 99,  P: 94,  R: 18    General:  patient lying in bed without any acute distress    HEENT:  normocephalic/atraumatic  Cardio:  RRR  Pulmonary:  no respiratory distress  Abdomen:  soft, non-tender, non-distended  Extremities:  no edema  Skin:  intact, warm/dry     Neurologic  Mental Status:  fully alert, attentive and oriented, follows commands, speech clear and fluent  Cranial Nerves:  visual fields intact, PERRL, EOMI with normal smooth pursuit, facial movements symmetric, hearing not formally tested but intact to conversation, palate elevation symmetric and uvula midline, no dysarthria, shoulder shrug strong bilaterally, tongue protrusion midline  Motor:  no abnormal movements, normal tone throughout, normal muscle bulk, no pronator drift, able to move all limbs spontaneously, strength 5/5 throughout upper and lower extremities  Reflexes:  2+ and symmetric throughout  Sensory:  intact/symmetric to light touch throughout upper and lower extremities  Coordination:  FNF and HS intact without dysmetria  Station/Gait:  Deferred     Labs  Labs and Imaging reviewed and used in developing the plan; pertinent results included.     Lab Results   Component Value Date     (H) 11/18/2017       The patient was discussed with the Fellow,  Dr. August.  The staff is Dr. Henderson.    Elton Kennedy MD   Pager: [phone]

## 2017-11-19 NOTE — PLAN OF CARE
Problem: Patient Care Overview  Goal: Plan of Care/Patient Progress Review  Outcome: No Change  [id number]: Admitted on 11/11/17 for left sided weakness, HA and fall at home on 11/6, and intracranial bleed.  VSS/A. A&Ox4. Neuros intact.  Tolerating Reg diet well. Voiding spont.  PIV S/L.  Up with SBA and walker.  Using call light appropriately. Plan:CT Brain done.  Continue with current POC.

## 2017-11-19 NOTE — BRIEF OP NOTE
Brief Op Note  Pre-operative diagnosis: right frontal lesion  Post-operative diagnosis: same  Procedure: stealth guided right frontal craniotomy for brain biopsy  Surgeon: MD Murtaza  Assistant(s):MD Ezio   Anesthesia: GETA  EBL: 5cc  Fluids: 400 cc  UOP: not recorded  Drains: none  Specimens: right frontal lesion for permanent and frozen  Implants: synthes cranial plating system  Findings:   Abnormal appearing tissue grossly with reactive tissue on pathology  Complications: None.  Condition: stable  Post op location:pacu  Comments: See dictated report for full details.    Plan:  - admit to 4 A post op  - head ct post op  - OK for diet after bedside swallow  - PT/OT  - OOB with assist  - Pain control   Page [phone] with questions

## 2017-11-19 NOTE — ANESTHESIA POSTPROCEDURE EVALUATION
Patient: Boy Aguirre    Procedure(s):  Stealth Guided Right Frontal Craniotomy for Biopsy - Wound Class: I-Clean    Diagnosis:Right Frontal Brain Bx  Diagnosis Additional Information: No value filed.    Anesthesia Type:  General, ETT    Note:  Anesthesia Post Evaluation    Patient location during evaluation: PACU  Patient participation: Able to fully participate in evaluation  Level of consciousness: awake  Pain management: adequate  Airway patency: patent  Cardiovascular status: acceptable  Respiratory status: acceptable  Hydration status: acceptable  PONV: none     Anesthetic complications: None          Last vitals:  Vitals:    11/19/17 1145 11/19/17 1200 11/19/17 1215   BP:  139/87 138/85   Pulse:      Resp:  14 16   Temp:  36.7  C (98  F)    SpO2: 93% 94%          Electronically Signed By: Cruz Robert MD  November 19, 2017  12:27 PM

## 2017-11-19 NOTE — ANESTHESIA CARE TRANSFER NOTE
Patient: Boy Aguirre    Procedure(s):  Stealth Guided Right Frontal Craniotomy for Biopsy - Wound Class: I-Clean    Diagnosis: Right Frontal Brain Bx  Diagnosis Additional Information: No value filed.    Anesthesia Type:   General, ETT     Note:  Airway :Face Mask  Patient transferred to:PACU  Comments: VSS, Report to RN.Handoff Report: Identifed the Patient, Identified the Reponsible Provider, Reviewed the pertinent medical history, Discussed the surgical course, Reviewed Intra-OP anesthesia mangement and issues during anesthesia, Set expectations for post-procedure period and Allowed opportunity for questions and acknowledgement of understanding      Vitals: (Last set prior to Anesthesia Care Transfer)    CRNA VITALS  11/19/2017 1003 - 11/19/2017 1036      11/19/2017             Pulse: 78    ART BP: 164/81    ART Mean: 114    SpO2: 97 %                Electronically Signed By: SHARI Amaya CRNA  November 19, 2017  10:36 AM

## 2017-11-19 NOTE — OP NOTE
DATE OF PROCEDURE:  11/19/2017      PREOPERATIVE DIAGNOSIS:  Right frontal lesion.      POSTOPERATIVE DIAGNOSIS:  Right frontal lesion.      PROCEDURE PERFORMED:  Right frontal Stealth-guided craniotomy for brain biopsy.      ATTENDING SURGEON:  Alexandr Hauser MD      RESIDENT SURGEON:  Rossy Holguin MD      ANESTHESIA:  General endotracheal anesthesia.      ESTIMATED BLOOD LOSS:  5 mL.      FLUIDS:  400 mL.      URINE OUTPUT:  Not recorded.      SPECIMENS:  A right frontal lesion for permanent and frozen.      IMPLANTS:  Synthes cranial plating system.      FINDINGS:  On frozen, were consistent with reactive tissue.      INDICATIONS FOR PROCEDURE:  Ms. Boy Aguirre is a 62-year-old male to female trans patient who presented to a referring institution with left-sided weakness after a fall at home.  An MRI of the brain revealed abnormal flair enhancement in the right frontal lobe.  Unfortunately, his workup was complicated by severe hypertension which was eventually controlled and a repeat MRI showed stable appearing FLAIR changes.  Because of this, he was taken to the OR for biopsy for diagnostic purposes.      DESCRIPTION OF PROCEDURE:  After informed consent was obtained, the patient was taken to the operating suite where general endotracheal anesthesia was induced.  He was transferred to the operating table in supine position.  His head was affixed in the [place] head durbin and then registered to the Stealth navigation system.  We planned an incision along the right frontal cranium to reach the area of the abnormality with an open biopsy.  We then prepped and draped his head in usual sterile fashion.  We injected 8 mL of 0.5% Marcaine with epinephrine 1:200,000 to proposed incision.  After an appropriate timeout, we made our skin incision with a #10 blade, carried this down to the skull.  Monopolar cautery was used to obtain hemostasis.  We then again checked our entry point with the Stealth and planned a  craniotomy.  We used the perforating drill bit to make a single bur hole, then used a craniotome with a footplate to turn a bone flap.  This was then handed to the nurse for safe keeping and was plated using the Synthes cranial plating system.  The dura was then opened sharply using a #15 blade and a [place] elevator.  When this was complete, the brain looked grossly abnormal with some dark petechial appearing spots.  We thinned the pieces of dura and then using monopolar cautery obtained about 1 cm cubed piece of tissue.  Some of this was sent for frozen, which returned as reactive tissue within the brain.  We then sent the rest for permanent.  We reapproximated the dura where we were able to and then placed a piece of Duragen on top of this.  We then placed the bone, securing it in place at 3 points of fixation and then irrigated copiously and proceeded to close.  The galea was closed using 2-0 Vicryl inverted interrupted fashion.  The skin was closed using a running 3-0 nylon.  All counts were correct at the end of the case and Dr. Moralez was present for the critical portions of the procedure and immediately available for the rest of the procedure.         AAMIR MORALEZ MD       As dictated by DONNELL OSUNA MD            D: 2017 10:41   T: 2017 11:29   MT: DARÍO      Name:     PARISH JERNIGAN   MRN:      [id number]        Account:        [id number]   :      1955           Procedure Date: 2017      Document: [id number]

## 2017-11-19 NOTE — PROGRESS NOTES
"Neurosurgery Daily Progress Note  11/19/2017    Overnight events/subjective: no acute events overnight.  Plan for OR today for brain biopsy.      O/ /79 (BP Location: Left arm)  Pulse 64  Temp 99.6  F (37.6  C) (Oral)  Resp 18  Ht 1.765 m (5' 9.5\")  Wt 78.8 kg (173 lb 11.2 oz)  SpO2 96%  BMI 25.28 kg/m2  Exam:   Gen: In bed, not in acute distress  MS: A&Ox3, Speech fluent and conversant   CN: Pupils round and reactive, extraocular movements intact, face symmetric, tongue midline, uvula & palate elevate symmetrically   Motor:     Delt Bi Tri WE WF    R 5 5 5 5 5 5   L 5 5 5 5 5 5     IP Quad Ham DF PF EHL   R 5 5 5 5 5 5   L 5 5 5 5 5 5     Sensory: intact to light touch   No drift  Non labored breathing    IMG: reviewed       A/P: Boy Aguirre is a 62 year old with left sided weakness and HA, presenting for OSH with elevated SBPs in the 220s and imaging demonstrating an area of edema with punctate areas of contrast enhancement and diffusion restriction in right frontal lobe. CT CAP demonstrating lymphangitic carcinomatosis.  OR today for stealth assisted right frontal brain biopsy.       - Hold steroids.  - Repeat MRI w/ and w/o contrast completed; suspicious for punctate strokes in an embolic pattern  - SBP < 140; Coreg, Amlodipine, Nifedipine, Amiloride, Labetalol PRN  - PET scan w/o identifiable location of hypermetabolic activity as target. Pulmonology now does not believe effusions to be malignant in nature and declines tapping for analysis.   - Neurology consult: recommend brain biopsy   - LP completed: no significant concerns.  - Renal u/s w/ vascular doppler: left renal artery occlusion  - Cardiac echo: EF 50-55%  - f/u CEA and PSA (serum): normal  - Medicine consult, appreciate continued recommendations.  - Nephrology consult: Considering HTN 2/2 renal sclerosis.  - NPO for OR today  - SCDs for DVT ppx  - PO diet for GI ppx    Dispo: 4A after brain biopsy       Please contact the " neurosurgery resident on call with questions by dialing * [phone], then entering [phone] when prompted

## 2017-11-19 NOTE — ANESTHESIA PREPROCEDURE EVALUATION
Anesthesia Evaluation     . Pt has not had prior anesthetic            ROS/MED HX    ENT/Pulmonary:  - neg pulmonary ROS     Neurologic:     (+)CVA (Concern for embolic strokes in right frontal lobe, planned for beto biopsy 11/19/17 due to concern for malignancy)     Cardiovascular:     (+) hypertension (Presented to OS in HTN crisis, now controlled on Amiloride, Carvedilil and Nifedipine)----. : . . . :. .       METS/Exercise Tolerance:  >4 METS   Hematologic:  - neg hematologic  ROS       Musculoskeletal:  - neg musculoskeletal ROS       GI/Hepatic:        (-) GERD   Renal/Genitourinary: Comment: Left Renal artery stenosis         Endo:  - neg endo ROS       Psychiatric:  - neg psychiatric ROS       Infectious Disease:  - neg infectious disease ROS       Malignancy:         Other:    (+) No chance of pregnancy C-spine cleared: Yes, no H/O Chronic Pain,                   Physical Exam  Normal systems: cardiovascular and pulmonary    Airway   Mallampati: I  TM distance: >3 FB  Neck ROM: full    Dental   (+) missing    Cardiovascular       Pulmonary                     Anesthesia Plan      History & Physical Review  History and physical reviewed and following examination; no interval change.    ASA Status:  3 .    NPO Status:  > 8 hours    Plan for General and ETT with Intravenous induction. Maintenance will be Balanced.    PONV prophylaxis:  Ondansetron (or other 5HT-3) and Dexamethasone or Solumedrol  Additional equipment: Arterial Line and 2nd IV      Postoperative Care  Postoperative pain management:  IV analgesics.      Consents  Anesthetic plan, risks, benefits and alternatives discussed with:  Patient..        CHART REVIEW    ANESTHESIA PREOP EVALUATION    PROCEDURE: Procedure(s):  Stealth Guided Craniotomy for Biopsy - Wound Class: I-Clean    HPI: Boy Aguirre is a 62 year old male to female transgender patient with no prior PMH who presents for the above procedure for biopsy given concern for brain  malignancy.     PAST MEDICAL HISTORY:  Past Medical History:   Diagnosis Date     Male-to-female transgender person        PAST SURGICAL HISTORY:  Past Surgical History:   Procedure Laterality Date     TONSILLECTOMY      As a child       SOCIAL HISTORY:   Social History   Substance Use Topics     Smoking status: Former Smoker     Packs/day: 1.00     Years: 5.00     Types: Cigarettes     Smokeless tobacco: Former User     Quit date: 11/13/1976      Comment: Smoked from age [age], but not since     Alcohol use Yes      Comment: Very rare beer       ALLERGIES:   Allergies   Allergen Reactions     Insulin Swelling     Face and throat swelling       MEDICATIONS:  No prescriptions prior to admission.       LABS:    UPT:   No results found for: HCGQUANT    BMP:  Recent Labs   Lab Test  11/18/17   1759   NA  137   POTASSIUM  4.5   CHLORIDE  104   CO2  26   BUN  32*   CR  2.10*   GLC  189*   ALICIA  8.3*       LFTs:   Recent Labs   Lab Test  11/13/17   0812   ALBUMIN  2.8*       CBC:   Recent Labs   Lab Test  11/18/17   1759   WBC  14.4*   RBC  3.85*   HGB  11.6*   HCT  36.2*   MCV  94   MCH  30.1   MCHC  32.0   RDW  12.6   PLT  175       Coags:  Recent Labs   Lab Test  11/18/17   1759   INR  1.00   PTT  29         - Antibiotics per surgery    Benjamín Schilling    11/18/2017  11:43 PM

## 2017-11-19 NOTE — PLAN OF CARE
Problem: Patient Care Overview  Goal: Plan of Care/Patient Progress Review  OT 6A: Cancel-pt in OR this morning

## 2017-11-19 NOTE — ANESTHESIA PROCEDURE NOTES
Arterial Line Procedure Note  Staff:     Anesthesiologist:  ARIANA ROBERTS    Resident/CRNA:  SHONDA ELIZALDE    Arterial line performed by resident/CRNA in presence of a teaching physician    Location: In OR After Induction  Procedure Start/Stop Times:     patient identified, IV checked, site marked, risks and benefits discussed, informed consent, monitors and equipment checked, pre-op evaluation and at physician/surgeon's request      Correct Patient: Yes      Correct Position: Yes      Correct Site: Yes      Correct Procedure: Yes      Correct Laterality:  Yes    Site Marked:  Yes  Line Placement:     Procedure:  Arterial Line    Insertion laterality:  Left    Skin Prep: Chloraprep      Patient Prep: patient draped, mask, sterile gloves, sterile gown and hat      Local skin infiltration:  None    Ultrasound Guided?: Yes      Artery evaluated via ultrasound confirming patency.   Using realtime imaging, the artery was punctured and the needle was observed entering the artery.      A permanent image is entered into patient's chart.      Catheter size:  20 gauge, 12 cm    Cath secured with: suture      Dressing:  Tegaderm    Complications:  None obvious    Arterial waveform: Yes      IBP within 10% of NIBP: Yes

## 2017-11-19 NOTE — PLAN OF CARE
Problem: Patient Care Overview  Goal: Plan of Care/Patient Progress Review  Outcome: No Change  Pt admitted with left-sided weakness, HA and high BP s/p fall at home on 11/6. VSS. Denies pain. Neuros intact. NPO. PIV SL. Voiding spont. No BM overnight, BS+, passing gas. Up with SBA and walker. Pre-op shower done last evening. Plan for OR today. Continue to monitor and follow POC.

## 2017-11-20 ENCOUNTER — APPOINTMENT (OUTPATIENT)
Dept: OCCUPATIONAL THERAPY | Facility: CLINIC | Age: 62
DRG: 515 | End: 2017-11-20
Attending: NEUROLOGICAL SURGERY
Payer: COMMERCIAL

## 2017-11-20 LAB
ANION GAP SERPL CALCULATED.3IONS-SCNC: 9 MMOL/L (ref 3–14)
BASOPHILS # BLD AUTO: 0 10E9/L (ref 0–0.2)
BASOPHILS NFR BLD AUTO: 0.1 %
BUN SERPL-MCNC: 30 MG/DL (ref 7–30)
CALCIUM SERPL-MCNC: 8 MG/DL (ref 8.5–10.1)
CHLORIDE SERPL-SCNC: 106 MMOL/L (ref 94–109)
CO2 SERPL-SCNC: 24 MMOL/L (ref 20–32)
CREAT SERPL-MCNC: 2.1 MG/DL (ref 0.66–1.25)
DIFFERENTIAL METHOD BLD: ABNORMAL
EOSINOPHIL # BLD AUTO: 0 10E9/L (ref 0–0.7)
EOSINOPHIL NFR BLD AUTO: 0 %
ERYTHROCYTE [DISTWIDTH] IN BLOOD BY AUTOMATED COUNT: 12.7 % (ref 10–15)
GFR SERPL CREATININE-BSD FRML MDRD: 32 ML/MIN/1.7M2
GLUCOSE SERPL-MCNC: 231 MG/DL (ref 70–99)
HCT VFR BLD AUTO: 33.1 % (ref 40–53)
HGB BLD-MCNC: 10.8 G/DL (ref 13.3–17.7)
IMM GRANULOCYTES # BLD: 0.1 10E9/L (ref 0–0.4)
IMM GRANULOCYTES NFR BLD: 0.5 %
LYMPHOCYTES # BLD AUTO: 1.6 10E9/L (ref 0.8–5.3)
LYMPHOCYTES NFR BLD AUTO: 7.7 %
MAGNESIUM SERPL-MCNC: 2.3 MG/DL (ref 1.6–2.3)
MCH RBC QN AUTO: 29.9 PG (ref 26.5–33)
MCHC RBC AUTO-ENTMCNC: 32.6 G/DL (ref 31.5–36.5)
MCV RBC AUTO: 92 FL (ref 78–100)
MONOCYTES # BLD AUTO: 1.5 10E9/L (ref 0–1.3)
MONOCYTES NFR BLD AUTO: 7.6 %
NEUTROPHILS # BLD AUTO: 17 10E9/L (ref 1.6–8.3)
NEUTROPHILS NFR BLD AUTO: 84.1 %
NRBC # BLD AUTO: 0 10*3/UL
NRBC BLD AUTO-RTO: 0 /100
PHOSPHATE SERPL-MCNC: 2.9 MG/DL (ref 2.5–4.5)
PLATELET # BLD AUTO: 217 10E9/L (ref 150–450)
POTASSIUM SERPL-SCNC: 4.1 MMOL/L (ref 3.4–5.3)
RBC # BLD AUTO: 3.61 10E12/L (ref 4.4–5.9)
SODIUM SERPL-SCNC: 139 MMOL/L (ref 133–144)
WBC # BLD AUTO: 20.3 10E9/L (ref 4–11)

## 2017-11-20 PROCEDURE — 25000128 H RX IP 250 OP 636: Performed by: STUDENT IN AN ORGANIZED HEALTH CARE EDUCATION/TRAINING PROGRAM

## 2017-11-20 PROCEDURE — 25000132 ZZH RX MED GY IP 250 OP 250 PS 637: Performed by: NURSE PRACTITIONER

## 2017-11-20 PROCEDURE — 97168 OT RE-EVAL EST PLAN CARE: CPT | Mod: GO | Performed by: OCCUPATIONAL THERAPIST

## 2017-11-20 PROCEDURE — 84100 ASSAY OF PHOSPHORUS: CPT | Performed by: STUDENT IN AN ORGANIZED HEALTH CARE EDUCATION/TRAINING PROGRAM

## 2017-11-20 PROCEDURE — 85025 COMPLETE CBC W/AUTO DIFF WBC: CPT | Performed by: STUDENT IN AN ORGANIZED HEALTH CARE EDUCATION/TRAINING PROGRAM

## 2017-11-20 PROCEDURE — 97110 THERAPEUTIC EXERCISES: CPT | Mod: GO | Performed by: OCCUPATIONAL THERAPIST

## 2017-11-20 PROCEDURE — 12000008 ZZH R&B INTERMEDIATE UMMC

## 2017-11-20 PROCEDURE — 97530 THERAPEUTIC ACTIVITIES: CPT | Mod: GO | Performed by: OCCUPATIONAL THERAPIST

## 2017-11-20 PROCEDURE — 40000133 ZZH STATISTIC OT WARD VISIT: Performed by: OCCUPATIONAL THERAPIST

## 2017-11-20 PROCEDURE — 97535 SELF CARE MNGMENT TRAINING: CPT | Mod: GO | Performed by: OCCUPATIONAL THERAPIST

## 2017-11-20 PROCEDURE — 80048 BASIC METABOLIC PNL TOTAL CA: CPT | Performed by: STUDENT IN AN ORGANIZED HEALTH CARE EDUCATION/TRAINING PROGRAM

## 2017-11-20 PROCEDURE — 25000132 ZZH RX MED GY IP 250 OP 250 PS 637: Performed by: STUDENT IN AN ORGANIZED HEALTH CARE EDUCATION/TRAINING PROGRAM

## 2017-11-20 PROCEDURE — 83735 ASSAY OF MAGNESIUM: CPT | Performed by: STUDENT IN AN ORGANIZED HEALTH CARE EDUCATION/TRAINING PROGRAM

## 2017-11-20 PROCEDURE — 25000132 ZZH RX MED GY IP 250 OP 250 PS 637: Performed by: PHYSICIAN ASSISTANT

## 2017-11-20 RX ADMIN — Medication 20 MG: at 06:26

## 2017-11-20 RX ADMIN — Medication 10 MG: at 04:18

## 2017-11-20 RX ADMIN — CARVEDILOL 25 MG: 25 TABLET, FILM COATED ORAL at 07:44

## 2017-11-20 RX ADMIN — CARVEDILOL 25 MG: 25 TABLET, FILM COATED ORAL at 18:31

## 2017-11-20 RX ADMIN — NIFEDIPINE 60 MG: 60 TABLET, EXTENDED RELEASE ORAL at 07:44

## 2017-11-20 RX ADMIN — Medication 10 MG: at 05:58

## 2017-11-20 RX ADMIN — Medication 10 MG: at 00:13

## 2017-11-20 RX ADMIN — Medication 20 MG: at 06:57

## 2017-11-20 RX ADMIN — AMILORIDE HYDROCLORIDE 10 MG: 5 TABLET ORAL at 08:01

## 2017-11-20 RX ADMIN — NIFEDIPINE 60 MG: 60 TABLET, EXTENDED RELEASE ORAL at 19:53

## 2017-11-20 ASSESSMENT — VISUAL ACUITY
OU: NORMAL ACUITY

## 2017-11-20 ASSESSMENT — ACTIVITIES OF DAILY LIVING (ADL): IADL_COMMENTS: OT: PT WAS IND

## 2017-11-20 NOTE — PROGRESS NOTES
" 11/20/17 1600   Quick Adds   Type of Visit Occupational Therapy Re-evaluation   Living Environment   Lives With alone   Living Arrangements apartment   Home Accessibility bed and bath are not on the first floor   Number of Stairs to Enter Home 30  (apt on 3rd floor, no elevator)   Number of Stairs Within Home 0   Transportation Available car   Living Environment Comment Is unsure who can be a caregiver once back home. 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 Has a daughter that lives 2 blocks away, but schedule is unknown   Self-Care   Dominant Hand right   Usual Activity Tolerance good   Current Activity Tolerance moderate   Regular Exercise yes   Activity/Exercise Type walking   Exercise Amount/Frequency daily   Equipment Currently Used at Home none   Functional Level Prior   Ambulation 0-->independent   Transferring 0-->independent   Toileting 0-->independent   Bathing 0-->independent   Dressing 0-->independent   Eating 0-->independent   Communication 0-->understands/communicates without difficulty   Swallowing 0-->swallows foods/liquids without difficulty   Cognition 0 - no cognition issues reported   Fall history within last six months yes   Number of times patient has fallen within last six months 1   General Information   Onset of Illness/Injury or Date of Surgery - Date 11/11/17   Referring Physician Demetrio Gongora MD   Patient/Family Goals Statement to discharge home   Additional Occupational Profile Info/Pertinent History of Current Problem per chart pt \"is a 62 year old male who is postoperative day #1 from right frontal open brain biopsy. \"   Precautions/Limitations fall precautions  (crani)   Weight-Bearing Status - LUE (10# lifting restriction)   Weight-Bearing Status - RUE (10# lifting restriction)   Cognitive Status Examination   Orientation orientation to person, place and time   Visual Perception   Visual " "Perception No deficits were identified   Sensory Examination   Sensory Quick Adds No deficits were identified   Range of Motion (ROM)   ROM Quick Adds No deficits were identified   Strength   Strength Comments OT: BUE not formally tested 2/2 crani precautions   Muscle Tone Assessment   Muscle Tone Comments OT: overall deconditioned   Coordination   Coordination Comments OT: WNL   Mobility   Bed Mobility Comments OT: SBA   Transfer Skills   Transfer Comments OT: SBA   Balance   Balance Comments OT: SBA in room and in rojo ambulation w/ walker   Instrumental Activities of Daily Living (IADL)   IADL Comments OT: Pt was ind   Activities of Daily Living Analysis   Impairments Contributing to Impaired Activities of Daily Living balance impaired;post surgical precautions;strength decreased;pain;cognition impaired   General Therapy Interventions   Planned Therapy Interventions ADL retraining;IADL retraining;balance training;cognition;strengthening;transfer training;home program guidelines;progressive activity/exercise   Clinical Impression   Criteria for Skilled Therapeutic Interventions Met yes, treatment indicated   OT Diagnosis decreased ind in ADLS/IADLS   Influenced by the following impairments generalized weakness and post surgical precautions   Assessment of Occupational Performance 3-5 Performance Deficits   Identified Performance Deficits generalized weakness and post surgical precautions   Clinical Decision Making (Complexity) Moderate complexity   Therapy Frequency daily   Predicted Duration of Therapy Intervention (days/wks) 11/25   Anticipated Discharge Disposition Home with Assist   Risks and Benefits of Treatment have been explained. Yes   Patient, Family & other staff in agreement with plan of care Yes   [facility] AM-PAC  \"6 Clicks\" Daily Activity Inpatient Short Form   1. Putting on and taking off regular lower body clothing? 4 - None   2. Bathing (including washing, rinsing, drying)? 3 - A Little "   3. Toileting, which includes using toilet, bedpan or urinal? 4 - None   4. Putting on and taking off regular upper body clothing? 3 - A Little   5. Taking care of personal grooming such as brushing teeth? 4 - None   6. Eating meals? 4 - None   Daily Activity Raw Score (Score out of 24.Lower scores equate to lower levels of function) 22   Total Evaluation Time   Total Evaluation Time (Minutes) 8

## 2017-11-20 NOTE — PROGRESS NOTES
[facility]  Neurosurgery Daily ICU Note:          Assessment   Boy Aguirre is a 62 year old male who is postoperative day #1 from right frontal open brain biopsy.           Plan 1.   Neuro: s/p open brain biopsy    Continue frequent neuro exams while in ICU. Notify MD for acute changes in exam.    Pain control    No steroids    Repeat MRI suspicious for punctate strokes in an embolic pattern    Evaluation by Neurology Stroke team    No clear metabolic hotspots on PET scan    CEA and PSA normal  2. CVS: hemodynamically stable    Maintain SBP < 140    Coreg, Amlodipine, Nifedipine, Amiloride    Will discuss suitable home regiment on discharge    Hydralazine and labetolol PRN    Continuous cardiac monitoring while in ICU  3. Pulmonary: no issues    Continuous pulse oximetry    Supplemental oxygen PRN    Incentive spirometry Q1H while awake  4. GI:     Advance diet as tolerated to regular    Bowel regimen. PRN anti-emetics.  5. Renal: no issues    mIVF -- TKO when patient is tolerating good PO intake    Parker out    Left renal artery occlusion, possibly contributing to HTN    Nephrology was following    Electrolyte replacement protocol    Continue to monitor intake/output  6. ID: afebrile, normal WBC count    Continue to monitor for fevers and/or signs of infection  7. Endocrine:    Continue glucose checks  8. Heme: no issues    Platelets > 100,000    INR < 1.5    Hemoglobin > 8  9. Prophylaxis    DVT: SCDs while in bed    GI: PO diet  10. Disposition: Discharge    Above patient and plan has been discussed with the neurosurgery chief resident.     Please dial * [phone] and enter [phone] to reach the on-call neurosurgery resident if you have questions.          Subjective     No acute events overnight. Brain biopsy was uneventful.           Objective   Temp:  [98  F (36.7  C)-99  F (37.2  C)] 98.3  F (36.8  C)  Heart Rate:  [66-90] 76  Resp:  [11-16] 14  BP: (138-152)/(74-93)  148/74  MAP:  [86 mmHg-164 mmHg] 97 mmHg  Arterial Line BP: (133-210)/() 147/70  SpO2:  [92 %-98 %] 94 %    Resp: 14    I/O last 3 completed shifts:  In: 478.75 [I.V.:478.75]  Out: 2230 [Urine:2225; Blood:5]    Physical Exam  General: NAD, lying comfortably in bed  Incision: clean, dry, dressing intact  Neurologic    Mental Status:       -- Awake; Alert; oriented x 3      -- Follows commands       -- Speech fluent, spontaneous. No aphasia or dysarthria.      -- no gaze preference. No apparent hemineglect.    Cranial Nerves:      -- visual fields full to confrontation, PERRL 3-2mm bilat and brisk      -- extraocular movements intact      -- face symmetrical, tongue midline      -- sensory V1-V3 intact bilaterally      -- palate elevates symmetrically, uvula midline      -- hearing grossly intact bilat    Motor:    Delt Bi Tri WE WF    R 5 5 5 5 5 5   L 5 5 5 5 5 5    IP Quad Ham DF PF EHL   R 5 5 5 5 5 5   L 5 5 5 5 5 5     Sensory: symmetrically intact to light touch x4 extremities.         Labs and Imaging   BMP  Recent Labs  Lab 11/20/17  0342 11/18/17  1759 11/18/17  0728 11/17/17  0745    137 137 138   POTASSIUM 4.1 4.5 4.1 3.9   CHLORIDE 106 104 104 104   CO2 24 26 25 26   BUN 30 32* 34* 29   CR 2.10* 2.10* 2.16* 2.00*   ALICIA 8.0* 8.3* 8.3* 8.3*       CBC  Recent Labs  Lab 11/20/17  0342 11/18/17  1759   WBC 20.3* 14.4*   HGB 10.8* 11.6*    175       COAGS  Recent Labs  Lab 11/18/17  1759   INR 1.00   PTT 29       CSF  Recent Labs  Lab 11/13/17  1317   CGLU 93*   CTP 50       MICRO  Recent Labs  Lab 11/13/17  1317   CULT No growth  Culture negative monitoring continues       IMAGING: no post-op imaging, all prior imaging reviewed       Please contact the neurosurgery resident on call with questions by dialing [phone], then entering [phone] when prompted

## 2017-11-20 NOTE — PLAN OF CARE
Problem: Patient Care Overview  Goal: Plan of Care/Patient Progress Review  Neuro: Patient a&OX4. PERRLA. Answers all questions appropriately and moves all extremities equally. Complains of headache, receiving tylenol for pain.     CV: HR NSR running 60s-70s. Goal SBP <140. Patient has labetalol with minimal response. Given hydralyzine x 3, can have an additional 20 mg @ 1930. If not effective, notify MD to place patient on nicardipine gtt. Afebrile.     Resp: ETCO2 monitoring applied CO2 32, IPI 8. LS clear, productive cough.     : voiding adequate amounts via bedside urinal.     GI: BS+, tolerating regular diet.     Skin: Surgical incision CDI.     Labs: WNL.    Continue q 1 hour neuro checks. Update MD as appropriate. Monitor BPs.

## 2017-11-20 NOTE — PLAN OF CARE
Problem: Patient Care Overview  Goal: Plan of Care/Patient Progress Review  Outcome: Improving  D/I/A:  Neuro-alert and oriented, moves all extremities to command, no overt deficits noted, PERRL, denies pain  CV-HR/BP stable, arterial line removed, SBP<140-no prns needed-labetalol and hydralazine ordered, sinus rhythm with occasional PVCs, apical pulse regular  Pulm-RA, lung sounds clear throughout  GI-regular diet-good oral intake  -voids spontaneously without difficulty in urinal  Skin-head incision CDI, skin otherwise intact  Lines-PIV  Gtts-none    P: Continue to monitor patient neurological status and transfer to general care floor when bed becomes available.

## 2017-11-20 NOTE — PLAN OF CARE
Problem: Patient Care Overview  Goal: Plan of Care/Patient Progress Review  Discharge Planner OT   Patient plan for discharge: home  Current status: OT re-eval completed, OT educated pt on ADLS w/in precautions. Pt ambulated in room and in rojo w/ SBA 300ft w/ walker and 50ft w/ HHA. Pt's vitals pre ambulation /73 HR 62, HR 60s-70s throughout and post ambulation /72, HR 64, pt asympatomatic throughout.  Barriers to return to prior living situation: medical status  Recommendations for discharge: home  Rationale for recommendations: A prn for heavy lifting       Entered by: Tiffanie Nunn 11/20/2017 4:30 PM

## 2017-11-20 NOTE — PLAN OF CARE
Neuro: Fully intact. Oriented X4. Pupils equal and reactive. Cranial nerves intact. Tongue extension midline.   and push/pulls strong. Checks Qhr.    Cardiac: Sinus rhythm. Occasional PVC/PAC. Systolic goal less than 140. Hydralazine and labetalol given prn. Afebrile.    Pulmonary: Lungs clear/ diminished on room air.     GI: Regular diet. Adequate PO intake.    : Voiding spontaneously via urinal. No bm overnight.    Skin: Bottom intact. Able to self turn. Pt is a standby assist for cord management purposes.     Right hand PIV saline locked.  Left radial art line, appropriate wave forms.     Possible transfer to  . Will continue to assess.

## 2017-11-21 ENCOUNTER — APPOINTMENT (OUTPATIENT)
Dept: OCCUPATIONAL THERAPY | Facility: CLINIC | Age: 62
DRG: 515 | End: 2017-11-21
Attending: NEUROLOGICAL SURGERY
Payer: COMMERCIAL

## 2017-11-21 PROCEDURE — 25000132 ZZH RX MED GY IP 250 OP 250 PS 637: Performed by: NURSE PRACTITIONER

## 2017-11-21 PROCEDURE — 25000132 ZZH RX MED GY IP 250 OP 250 PS 637: Performed by: STUDENT IN AN ORGANIZED HEALTH CARE EDUCATION/TRAINING PROGRAM

## 2017-11-21 PROCEDURE — 40000141 ZZH STATISTIC PERIPHERAL IV START W/O US GUIDANCE

## 2017-11-21 PROCEDURE — 40000133 ZZH STATISTIC OT WARD VISIT

## 2017-11-21 PROCEDURE — 83520 IMMUNOASSAY QUANT NOS NONAB: CPT | Performed by: NEUROLOGICAL SURGERY

## 2017-11-21 PROCEDURE — 97535 SELF CARE MNGMENT TRAINING: CPT | Mod: GO

## 2017-11-21 PROCEDURE — 97530 THERAPEUTIC ACTIVITIES: CPT | Mod: GO

## 2017-11-21 PROCEDURE — 36415 COLL VENOUS BLD VENIPUNCTURE: CPT | Performed by: NEUROLOGICAL SURGERY

## 2017-11-21 PROCEDURE — 86255 FLUORESCENT ANTIBODY SCREEN: CPT | Performed by: NEUROLOGICAL SURGERY

## 2017-11-21 PROCEDURE — 83519 RIA NONANTIBODY: CPT | Performed by: NEUROLOGICAL SURGERY

## 2017-11-21 PROCEDURE — 12000008 ZZH R&B INTERMEDIATE UMMC

## 2017-11-21 PROCEDURE — 25000132 ZZH RX MED GY IP 250 OP 250 PS 637: Performed by: PHYSICIAN ASSISTANT

## 2017-11-21 RX ORDER — OXYCODONE HYDROCHLORIDE 5 MG/1
5 TABLET ORAL EVERY 4 HOURS PRN
Qty: 30 TABLET | Refills: 0 | Status: SHIPPED | OUTPATIENT
Start: 2017-11-21 | End: 2018-02-27

## 2017-11-21 RX ORDER — AMOXICILLIN 250 MG
3 CAPSULE ORAL 2 TIMES DAILY
Qty: 30 TABLET | Refills: 0 | Status: SHIPPED | OUTPATIENT
Start: 2017-11-21 | End: 2017-11-22

## 2017-11-21 RX ORDER — CARVEDILOL 25 MG/1
25 TABLET ORAL 2 TIMES DAILY WITH MEALS
Qty: 60 TABLET | Refills: 1 | Status: SHIPPED | OUTPATIENT
Start: 2017-11-21 | End: 2017-11-22

## 2017-11-21 RX ORDER — AMILORIDE HYDROCHLORIDE 5 MG/1
10 TABLET ORAL DAILY
Qty: 30 TABLET | Refills: 1 | Status: SHIPPED | OUTPATIENT
Start: 2017-11-22 | End: 2017-11-22

## 2017-11-21 RX ADMIN — NIFEDIPINE 60 MG: 60 TABLET, EXTENDED RELEASE ORAL at 08:51

## 2017-11-21 RX ADMIN — CARVEDILOL 25 MG: 25 TABLET, FILM COATED ORAL at 18:27

## 2017-11-21 RX ADMIN — NIFEDIPINE 60 MG: 60 TABLET, EXTENDED RELEASE ORAL at 20:47

## 2017-11-21 RX ADMIN — AMILORIDE HYDROCLORIDE 10 MG: 5 TABLET ORAL at 08:51

## 2017-11-21 RX ADMIN — CARVEDILOL 25 MG: 25 TABLET, FILM COATED ORAL at 08:51

## 2017-11-21 ASSESSMENT — VISUAL ACUITY
OU: NORMAL ACUITY

## 2017-11-21 NOTE — DISCHARGE SUMMARY
[facility] Discharge Summary and Instructions    Boy Aguirre MRN# [id number]   Age: 62 year old YOB: 1955     Date of Admission:  11/11/2017  Date of Discharge::  11/22/2017  Admitting Physician:  Alexandr Hauser MD  Discharge Physician:  Alexandr Hauser MD          Admission Diagnoses:   Intracranial mass [R90.0]  S//P craniotomy [Z98.890]          Discharge Diagnosis:   Intracranial mass [R90.0]  S/P craniotomy [Z98.890]          Procedures:   11/19/2017  Right frontal Stealth-guided craniotomy for brain biopsy.           Brief History of Illness:   Ms. Boy Aguirre is a 62-year-old male to female trans patient who presented to a referring institution with left-sided weakness after a fall at home.  An MRI of the brain revealed abnormal flair enhancement in the right frontal lobe.  Unfortunately, his workup was complicated by severe hypertension which was eventually controlled and a repeat MRI showed stable appearing FLAIR changes.  Because of this, he was taken to the OR for biopsy for diagnostic purposes.          Hospital Course:   Upon admission it was noted that the patient's blood pressure was elevated and GFR was diminished.  Medicine and Nephrology were consulted, it was found that patient had left renal artery occlusion, it was felt this was best managed with aggressive BP management, no surgical intervention was recommended.  The patient's blood pressure did begin to improve with treatment of coreg, amlodipine, nifedipine, and amiloride.  The patient's GFR also improved as blood pressure became more controlled.  ECHO was also obtained, EF showed 50-55%.    Bilateral pleural effusions were also noted on imaging.  A PET scan was obtained in order to further evaluate for malignancy and nothing in the chest enhanced.  Therefore it was not felt that biopsying the effusions would yield any further information.    PSA and CEA were also obtained to rule other potential primary  "processes and found to be normal.    A repeat MRI brain was performed on 11/18/2017 and was suspicious for punctate strokes in an embolic pattern, neurology was consulted and recommended surgical brain biopsy to further delineate the etiology of white matter changes.    Patient underwent biopsy on 11/19/2017, following surgery he did quite well.  He was initially monitored in ICU however was able to transfer to general floor.  Therapies felt he was safe to discharge home.  Patient was tolerating diet, voiding, passing bowel movements, blood pressure was much better controlled, and pain was controlled prior to discharge.    Final surgical pathology was pending at discharge.              Discharge Medications:     Current Discharge Medication List      START taking these medications    Details   oxyCODONE IR (ROXICODONE) 5 MG tablet Take 1 tablet (5 mg) by mouth every 4 hours as needed for moderate to severe pain  Qty: 30 tablet, Refills: 0    Associated Diagnoses: Brain tumor (H)      carvedilol (COREG) 25 MG tablet Take 1 tablet (25 mg) by mouth 2 times daily (with meals)  Qty: 60 tablet, Refills: 1    Associated Diagnoses: Hypertension, unspecified type      senna-docusate (SENOKOT-S;PERICOLACE) 8.6-50 MG per tablet 3 tablets by Oral or Feeding Tube route 2 times daily  Qty: 30 tablet, Refills: 0    Associated Diagnoses: Brain tumor (H)      NIFEdipine ER (ADALAT CC) 60 MG 24 hr tablet Take 1 tablet (60 mg) by mouth 2 times daily  Qty: 60 tablet, Refills: 1    Associated Diagnoses: Hypertension, unspecified type      aMILoride (MIDAMOR) 5 MG tablet Take 2 tablets (10 mg) by mouth daily  Qty: 30 tablet, Refills: 1    Associated Diagnoses: Hypertension, unspecified type            O/ /87  Pulse 77  Temp 98.2  F (36.8  C) (Oral)  Resp 16  Ht 1.765 m (5' 9.5\")  Wt 90.2 kg (198 lb 13.7 oz)  SpO2 94%  BMI 28.94 kg/m2  Exam:   Gen: Laying in bed, not in acute distress  MS: A&Ox3, Speech fluent and conversant "   CN: Pupils round and reactive, extraocular movements intact, face symmetric, tongue midline, uvula & palate elevate symmetrically   Motor:      Delt Bi Tri WE WF    R 5 5 5 5 5 5   L 5 5 5 5 5 5      IP Quad Ham DF PF EHL   R 5 5 5 5 5 5   L 5 5 5 5 5 5      Sensory: intact to light touch   No drift     Non labored breathing           Discharge Instructions and Follow-Up:   Discharge diet: Regular   Discharge activity: You may advance activity as tolerated. No strenuous exercise or heay lifting greater than 10 lbs for 4 weeks or until seen and cleared in clinic.   Discharge follow-up: Follow-up with Dr Lafleur in one month with MRI brain w/wo contrast    Follow-up with Dr Jon of Neurology Stroke in one month    Follow-up with PCP within one week for blood pressure recheck and lab draw ([facility]) to assess renal function    Follow-up with PCP in two weeks for suture removal       Wound care: Ok to shower,however no scrubbing of the wound and no soaking of the wound, meaning no bathtubs or swimming pools. Pat dry only. Leave wound open to air.  Sutures are not absorbable and need to be removed in 2 weeks. If patient still at rehab by this time, the sutures may be removed by the rehab physician if he or she considers that the wound has healed completely.     Please call if you have:  1. increased pain, redness, drainage, swelling at your incision  2. fevers > 101.5 F degrees  3. with any questions or concerns.  You may reach the Neurosurgery clinic at [phone] during regular work hours. ER at [phone].    and ask for the Neurosurgery Resident on call at [phone], during off hours or weekends.         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     Kym Rosen, ACNP-BC, CCRN, CNRN  Department of Neurosurgery  Pager: [phone]

## 2017-11-21 NOTE — PLAN OF CARE
Problem: Patient Care Overview  Goal: Plan of Care/Patient Progress Review  Patient is POD 2 right frontal open brain biopsy. VSS, blood pressure within parameters overnight. A&Ox4. Neuros intact. Right sided incision is intact with sutures, DEZ. Regular diet, decreased appetite. PIV SL. Up SBA and GB. Voiding spontaneously. Having loose stools, bowel meds held last night. Denies pain. Plan to d/c soon, needs ride set up (lives in [place]), care coordinator informed. Will continue to monitor.

## 2017-11-21 NOTE — PROGRESS NOTES
"Neurosurgery Daily Progress Note  11/21/2017    Overnight events/subjective: No acute events overnight.  Patient is interested in discharge.    O/ /73  Pulse 65  Temp 97.9  F (36.6  C) (Oral)  Resp 16  Ht 1.765 m (5' 9.5\")  Wt 79 kg (174 lb 2.6 oz)  SpO2 97%  BMI 25.35 kg/m2  Exam:   Gen: In bed, not in acute distress  MS: A&Ox3, Speech fluent and conversant   CN: Pupils round and reactive, extraocular movements intact, face symmetric, tongue midline, uvula & palate elevate symmetrically   Motor:     Delt Bi Tri WE WF    R 5 5 5 5 5 5   L 5 5 5 5 5 5     IP Quad Ham DF PF EHL   R 5 5 5 5 5 5   L 5 5 5 5 5 5     Sensory: intact to light touch   No drift  Non labored breathing    IMG: reviewed       A/P: Boy Aguirre is a 62 year old with left sided weakness and HA, presenting for OSH with elevated SBPs in the 220s and imaging demonstrating an area of edema with punctate areas of contrast enhancement and diffusion restriction in right frontal lobe. CT CAP demonstrating lymphangitic carcinomatosis. POD2 s/p right frontal open brain biopsy.    - Hold steroids.  - Repeat MRI w/ and w/o contrast completed; suspicious for punctate strokes in an embolic pattern; Neurology stroke assessed patient.  - SBP < 140; Coreg, Amlodipine, Nifedipine, Amiloride, Labetalol PRN  - PET scan w/o identifiable location of hypermetabolic activity as target. Pulmonology now does not believe effusions to be malignant in nature and declines tapping for analysis.   - LP completed: no significant concerns.  - Renal u/s w/ vascular doppler: left renal artery occlusion  - Cardiac echo: EF 50-55%  - f/u CEA and PSA (serum): normal  - Medicine consult, appreciate continued recommendations.  - Nephrology consult: Considering HTN 2/2 renal sclerosis.  - Regular diet  - SCDs for DVT ppx  - PO diet for GI ppx    Dispo: d/c 11/21. Barriers: transportation setup.      Please contact the neurosurgery resident on call with questions by " dialing [phone], then entering [phone] when prompted        I have reviewed the history above and agree with the resident's assessment and plan.  BASSAM Alonso MD

## 2017-11-21 NOTE — PLAN OF CARE
Problem: Patient Care Overview  Goal: Plan of Care/Patient Progress Review  Discharge Planner OT   Patient plan for discharge: Home with assist, reports he has good support system in [place]  Current status: Patient seen for standing ADLs within precautions, performs with SBA. Patient ambulates ~650 ft to rehab gym, negotiates 25 stairs with HHA from OT. Per patient, does not have railings but reports someone will be available to assist with stair negotiation after d/c. Patient educated on safe tub transfer and showering with incision, performs tub transfer SBA. Patient with no LOB or SOB with ambulation and stairs.  Barriers to return to prior living situation: Precautions, home set up barriers (will need HHA or SBA for stair negotiation)  Recommendations for discharge: Home with assist  Rationale for recommendations: Assist for heavy ADLs and IADLs.       Entered by: Jennie Tran 11/21/2017 8:57 AM

## 2017-11-21 NOTE — PLAN OF CARE
Problem: Patient Care Overview  Goal: Plan of Care/Patient Progress Review  Outcome: Improving    Pt transferred from  s/p [facility] for brain biopsy, here around 1800. VSS. Denies pain. Pt can be hypertensive but much better controlled now with Coreg and nifedipne. VSS. Denies pain. Neuros intact. PIV SL'd. Pt states not hungry this shift but only feeling thirsty. Denies nausea. Good PO with fluids. Voiding spont. Pt refused bowel meds, having loose stools. Up with SBA and walker. SCDs ordered. Possible DC tomorrow or Wednesday. Pt lives far away and currently does not have a plan for a ride home. Pt also does not feel ready to DC. Pt lives alone. Mentioned this information to Shilpa, Care Coordinator, this shift and she will follow up with pt. Cont POC.

## 2017-11-22 VITALS
HEART RATE: 65 BPM | HEIGHT: 70 IN | TEMPERATURE: 97.8 F | SYSTOLIC BLOOD PRESSURE: 144 MMHG | BODY MASS INDEX: 24.93 KG/M2 | RESPIRATION RATE: 16 BRPM | OXYGEN SATURATION: 97 % | WEIGHT: 174.16 LBS | DIASTOLIC BLOOD PRESSURE: 79 MMHG

## 2017-11-22 PROCEDURE — 25000132 ZZH RX MED GY IP 250 OP 250 PS 637: Performed by: STUDENT IN AN ORGANIZED HEALTH CARE EDUCATION/TRAINING PROGRAM

## 2017-11-22 PROCEDURE — 25000132 ZZH RX MED GY IP 250 OP 250 PS 637: Performed by: PHYSICIAN ASSISTANT

## 2017-11-22 PROCEDURE — 25000132 ZZH RX MED GY IP 250 OP 250 PS 637: Performed by: NURSE PRACTITIONER

## 2017-11-22 RX ORDER — AMILORIDE HYDROCHLORIDE 5 MG/1
10 TABLET ORAL DAILY
Qty: 30 TABLET | Refills: 1 | Status: SHIPPED | OUTPATIENT
Start: 2017-11-22 | End: 2018-02-27

## 2017-11-22 RX ORDER — CARVEDILOL 25 MG/1
25 TABLET ORAL 2 TIMES DAILY WITH MEALS
Qty: 60 TABLET | Refills: 1 | Status: SHIPPED | OUTPATIENT
Start: 2017-11-22 | End: 2018-08-03

## 2017-11-22 RX ORDER — AMOXICILLIN 250 MG
3 CAPSULE ORAL 2 TIMES DAILY
Qty: 60 TABLET | Refills: 0 | Status: SHIPPED | OUTPATIENT
Start: 2017-11-22 | End: 2018-02-27

## 2017-11-22 RX ADMIN — CARVEDILOL 25 MG: 25 TABLET, FILM COATED ORAL at 07:41

## 2017-11-22 RX ADMIN — NIFEDIPINE 60 MG: 60 TABLET, EXTENDED RELEASE ORAL at 07:41

## 2017-11-22 RX ADMIN — AMILORIDE HYDROCLORIDE 10 MG: 5 TABLET ORAL at 07:41

## 2017-11-22 ASSESSMENT — VISUAL ACUITY
OU: NORMAL ACUITY
OU: NORMAL ACUITY

## 2017-11-22 NOTE — PLAN OF CARE
Problem: Patient Care Overview  Goal: Plan of Care/Patient Progress Review  Outcome: No Change  VSS, A&O X4. No neuro deficits. SBA with activity, no gait abnormality. Incision to L forehead with no drainage, secured with sutures.  Plan to dc in a.m, has bus ticket for 0840.  Will take taxi at 0700 to bus station.  Neuro surgery team aware and AVS updated.  Systole  in a.m, no IV med given, with po HTN meds systole dropped below 140.  No other reading above 140 threshold.  No pain, no nausea, voiding adequately, ate two full meals, one snack.

## 2017-11-22 NOTE — PLAN OF CARE
Problem: Patient Care Overview  Goal: Plan of Care/Patient Progress Review  Occupational Therapy Discharge Summary    Reason for therapy discharge:    Discharged to home.    Progress towards therapy goal(s). [facility] for goal details.  Goals partially met.  Barriers to achieving goals:   discharge from facility.    Therapy recommendation(s):    No further therapy is recommended.

## 2017-11-22 NOTE — PLAN OF CARE
Problem: Patient Care Overview  Goal: Plan of Care/Patient Progress Review  Outcome: No Change  Pt POD#3 s/p R frontal open brain biopsy. AVSS. A&Ox4. Neuro s intact. Pt incision closed with sutures; CDI. Pt denies pain. Up ad karina in room. Voiding. PIV removed. Plan to DC home to [place] today; bus ride arranged for 0840. Pt given AVS. [facility] then take a taxi to bus station. Continue POC.

## 2017-11-22 NOTE — PROGRESS NOTES
Began caring for pt at 1900. Pt A/O. VSS. Neuros intact. Incision DEZ with sutures. Plan for discharge in the am. Will report off to oncoming staff.

## 2017-11-22 NOTE — PLAN OF CARE
Problem: Patient Care Overview  Goal: Plan of Care/Patient Progress Review  Physical Therapy Discharge Summary    Reason for therapy discharge:    All goals and outcomes met, no further needs identified.  OT to continue with ambulation for functional endurance and ADL training    Progress towards therapy goal(s). See goals on Care Plan in [facility] electronic health record for goal details.  Goals met  Adequate for PT discharge    Therapy recommendation(s):    No further therapy is recommended.  OT to continue following pt for skilled ADL and mobility needs.  Pt was able to negotiate x25 steps and ambulate 650ft with SBA or less today which far exceeds initital PT goals.  Completing PT Orders.

## 2017-11-22 NOTE — PLAN OF CARE
Problem: Pain, Acute (Adult)  Goal: Identify Related Risk Factors and Signs and Symptoms  Related risk factors and signs and symptoms are identified upon initiation of [employer] Clinical Practice Guideline (CPG).   Outcome: Adequate for Discharge Date Met: 11/22/17  POD #3 s/p right frontal open brain biopsy. VSS. Denies pain. Pt transported to pharmacy and front door via wheelchair. Pt picked up at front door by [employer] taxi service.

## 2017-11-24 LAB
MISCELLANEOUS TEST: NORMAL
MISCELLANEOUS TEST: NORMAL
RADIOLOGIST FLAGS: ABNORMAL

## 2017-11-27 LAB
BACTERIA SPEC CULT: NORMAL
COPATH REPORT: NORMAL
SPECIMEN SOURCE: NORMAL

## 2017-11-29 LAB — PNP ABY SERUM: NORMAL

## 2017-12-13 ENCOUNTER — CARE COORDINATION (OUTPATIENT)
Dept: NEUROLOGY | Facility: CLINIC | Age: 62
End: 2017-12-13

## 2017-12-13 NOTE — PROGRESS NOTES
Neurosurgery Discharge Coordination Note       Responsible Attending physician: Dr. Hauser     Operation performed: 11/19 - Right frontal Stealth-guided craniotomy for brain biopsy.    Date of Discharge: 11/22/17    Discharge to: Home     Current status: Patient states he is doing great since discharge. He has been doing home exercise and regaining leg strength. Getting sutures removed Friday. Appointment with primary care provider tomorrow. Occasional minor headache. Taking oxy ~ once daily. Denies redness, swelling,increased tenderness or elevated temp. Reports Incision CDI without signs of infection. Denies current bowel or bladder issues. Dental appointment today - may get mouthguard as he grinds his teeth.     Discharge instructions and medications reviewed with patient.  Follow up appointments/imaging/tests needed:     Appointment with Dr. Jon on 1/9/18 at 9:30 to follow-up on amyloid angiopathy. Patient does not need to follow-up with Dr. Hauser.

## 2018-01-09 ENCOUNTER — PRE VISIT (OUTPATIENT)
Dept: NEUROLOGY | Facility: CLINIC | Age: 63
End: 2018-01-09

## 2018-01-10 ENCOUNTER — TELEPHONE (OUTPATIENT)
Dept: NEUROLOGY | Facility: CLINIC | Age: 63
End: 2018-01-10

## 2018-01-10 NOTE — TELEPHONE ENCOUNTER
Call placed to patient. Patient rescheduled appointment yesterday with Dr. Jon. Appointment was rescheduled for June. Patient should be seen sooner. MM informing patient that I have an appointment in February and encouraged him to return call.

## 2018-01-10 NOTE — TELEPHONE ENCOUNTER
Patient states that he had car trouble and was unable to attend appointment yesterday. Patient will come in on 2/27/18 with Dr. Jon. Patient states he is doing well. No headaches. He has been following with his primary care provider.

## 2018-02-27 ENCOUNTER — HOSPITAL ENCOUNTER (EMERGENCY)
Facility: CLINIC | Age: 63
Discharge: HOME | End: 2018-02-27
Attending: EMERGENCY MEDICINE | Admitting: EMERGENCY MEDICINE

## 2018-02-27 ENCOUNTER — OFFICE VISIT (OUTPATIENT)
Dept: NEUROLOGY | Facility: CLINIC | Age: 63
End: 2018-02-27
Payer: COMMERCIAL

## 2018-02-27 VITALS
SYSTOLIC BLOOD PRESSURE: 230 MMHG | DIASTOLIC BLOOD PRESSURE: 120 MMHG | HEIGHT: 70 IN | BODY MASS INDEX: 26.41 KG/M2 | WEIGHT: 184.5 LBS | HEART RATE: 60 BPM

## 2018-02-27 VITALS
BODY MASS INDEX: 26.86 KG/M2 | SYSTOLIC BLOOD PRESSURE: 229 MMHG | RESPIRATION RATE: 16 BRPM | WEIGHT: 184.5 LBS | DIASTOLIC BLOOD PRESSURE: 129 MMHG | TEMPERATURE: 98.4 F | OXYGEN SATURATION: 100 %

## 2018-02-27 DIAGNOSIS — I63.9 CEREBROVASCULAR ACCIDENT (CVA), UNSPECIFIED MECHANISM (H): Primary | ICD-10-CM

## 2018-02-27 DIAGNOSIS — I10 ESSENTIAL HYPERTENSION: ICD-10-CM

## 2018-02-27 LAB
ANION GAP SERPL CALCULATED.3IONS-SCNC: 8 MMOL/L (ref 3–14)
BUN SERPL-MCNC: 23 MG/DL (ref 7–30)
CALCIUM SERPL-MCNC: 8.8 MG/DL (ref 8.5–10.1)
CHLORIDE SERPL-SCNC: 106 MMOL/L (ref 94–109)
CO2 SERPL-SCNC: 27 MMOL/L (ref 20–32)
CREAT SERPL-MCNC: 2.23 MG/DL (ref 0.66–1.25)
GFR SERPL CREATININE-BSD FRML MDRD: 30 ML/MIN/1.7M2
GLUCOSE SERPL-MCNC: 136 MG/DL (ref 70–99)
POTASSIUM SERPL-SCNC: 4.2 MMOL/L (ref 3.4–5.3)
SODIUM SERPL-SCNC: 141 MMOL/L (ref 133–144)

## 2018-02-27 PROCEDURE — 99284 EMERGENCY DEPT VISIT MOD MDM: CPT | Performed by: EMERGENCY MEDICINE

## 2018-02-27 PROCEDURE — 93005 ELECTROCARDIOGRAM TRACING: CPT | Performed by: EMERGENCY MEDICINE

## 2018-02-27 PROCEDURE — 80048 BASIC METABOLIC PNL TOTAL CA: CPT | Performed by: EMERGENCY MEDICINE

## 2018-02-27 PROCEDURE — 93010 ELECTROCARDIOGRAM REPORT: CPT | Mod: Z6 | Performed by: EMERGENCY MEDICINE

## 2018-02-27 PROCEDURE — 99284 EMERGENCY DEPT VISIT MOD MDM: CPT | Mod: 25 | Performed by: EMERGENCY MEDICINE

## 2018-02-27 RX ORDER — AMILORIDE HYDROCHLORIDE 5 MG/1
10 TABLET ORAL DAILY
Qty: 60 TABLET | Refills: 0 | Status: SHIPPED | OUTPATIENT
Start: 2018-02-27 | End: 2018-08-03

## 2018-02-27 RX ORDER — HYDROCHLOROTHIAZIDE 12.5 MG/1
25 CAPSULE ORAL DAILY
Qty: 60 CAPSULE | Refills: 0 | Status: SHIPPED | OUTPATIENT
Start: 2018-02-27 | End: 2018-08-03

## 2018-02-27 ASSESSMENT — ENCOUNTER SYMPTOMS
ABDOMINAL PAIN: 0
COLOR CHANGE: 0
ARTHRALGIAS: 0
NECK STIFFNESS: 0
DIFFICULTY URINATING: 0
HEADACHES: 0
EYE REDNESS: 0
CONFUSION: 0
SHORTNESS OF BREATH: 0
FEVER: 0

## 2018-02-27 ASSESSMENT — PAIN SCALES - GENERAL: PAINLEVEL: NO PAIN (0)

## 2018-02-27 NOTE — PROGRESS NOTES
[facility]  Stroke Clinic  2/27/2018      Boy Aguirre MRN# [id number]   YOB: 1955 Age: 62 year old     Primary care provider: No primary care provider on file.    Requesting physician: regular follow-up post stroke admission    Reason for Clinic: Rt frontal amyloid angiopathy ICH and multifocal ischemic stroke.         Assessment and Recommendations:    Boy Aguirre is a 61 yo m previously healthy (m to f transgender) presented 11/11/2017 ~6 days from [facility] with left side weakness and found to have subacute looking Rt frontal ICH with edema, Severe HTN + JALIL.  He was transferred to [facility] for ICH w/u and management. MRI brain had multiple micro hemorrhages within the Rt frontal edema on SWI suspicious for a mass. Malignancy workup + CSF analysis unremarkable. TTE: EF 50-55%, mild concentric wall thickening LVH, no wma,mild left atria enlargement.  US kidney suggested renal artery occlusion and nephrology considering stenosis and managing it medically with blood pressure control.  Repeat brain MRI 11/17/17 showed new embolic multifocal subcortical ischemic strokes b/l hemispheres. Stable looking Rt frontal edema with multiple areas of microhemorrages within. Stroke team consulted and suspected possible Amyloid Beta related CNS angitis and suggested brain bx. Biopsy showed amyloid angiopathy without inflammation. Patient was managed with Bp control (on 3 anti-HTN)and avoiding blood thinners. His neurological deficits resolved and JALIL improved so he was discharge home to followup with his PCP.  Unfortunately he didn't had the chance to go see his PCP. He decided to stop his diuretic because he though he doesn't need it any more. He got rid of the access fluid when he was in the hospital. Nifidipine made him have chest pains/palpitations so he stopped it. He is only on coreg right now. His blood pressure in the clinic remained around Bp 210-230/120. He was totally  asymptomatic.     Dx:  1) lobar Rt frontal ICH due to amyloid angiopathy without inflammatory cells.  2) Multifocal ischemic small strokes (likely related to the amyloid angiopathy ICH). However it deserve an embolic workup.   3) uncontrolled HTN    Plan:  - Patient will be seen in the ED for further Bp management.  - He will need to be compliant with his blood pressure medications.  - Follow closely with his PCP  - Blood pressure target < 130/85 (due to risk of bleeding with amyloid angioapthy)  - avoid aspirin, nsaids and blood thinners  - MRI brain stroke protocol for follow up and to check his vessels (intra+extracranial)  - Ziopatch 2 weeks  - Patient didn't get bubble study in TTE. Probably low yield to repeat.  - Patient sent to ED  - Followup in clinic in 3-4 months      Kiera Art  Stroke neurology          History of Present Illness:     History is obtained from the patient and/or family and medical record    Boy Aguirre is a 61 yo m previously healthy (m to f transgender) presented 11/11/2017 ~6 days from [facility] with left side weakness and found to have subacute looking Rt frontal ICH with edema. Before transfer from OSH he also had high Bp + JALIL 2.3 and new small acute ICH in the same frontal region. He was transferred to [facility] for ICH w/u and management under neurosurgery care due to initial suspicion of possible mass. MRI brain had multiple micro hemorrhages within the Rt frontal edema on SWI. Malignancy workup initiated with CT CAP showing ? lymphangatic carcinomatosis, PET with low metabolism of Rt frontal lesion and with suspicion of a pulmonary malignant process. PSA + CEA normal. CSF 11/13/17 RBC 51, WBC 4-7, lymp 37%, mono 58%, g 93 high, protein 50. TTE: EF 50-55%, mild concentric wall thickening LVH, no wma,mild left atria enlargement.  US kidney suggested renal artery occlusion and nephrology considering stenosis and managing it medically with blood pressure control.       Repeat brain  MRI 11/17/17 showed new embolic multifocal subcortical ischemic strokes b/l hemispheres. Satble looking Rt frontal edema with multiple areas of microhemorrages within. Stroke team suspected possible Amyloid Beta related CNS angitis and suggest brain bx. Biopsy showed amyloid angiopathy without inflammation. His neurological deficits resolved and JALIL improved so he was discharge home to followup with his PCP.    In the last 3 months he was doing well. He is very active everyday and complete routine daily exercise. 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 He is independent with his ADLs and IADLs. No residual deficits from his stroke. He admits resolution of his headaches. Denies any recurrence of his symptoms today.  No hearing loss, no gait issues, no memory issues, no weakness, no dysarthria, no dysphagia, no aphasia no sensory symptoms. Denies depression and no AVTAR. His angie is stable, no night sweats, normal appetite.    He didn't have chance to go see his PCP. He decided to stop his diuretic because he though he doesn't need it any more. He got rid of the access fluid when he was in the hospital. Nifidipine made him have chest pains/palpitations so he stopped it. He is only on coreg right now. His blood pressure in the clinic remained around Bp 210-230/120. He was totally asymptomatic.            Past Medical History:     Past Medical History:   Diagnosis Date     Male-to-female transgender person               Social History:     Social History     Social History     Marital status: Single     Spouse name: N/A     Number of children: N/A     Years of education: N/A     Occupational History     Not on file.     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  Remote former smoking history.  Very rare etoh.      He ena taking any hormone therapy. X smoker. Denies use of street drugs, no alcohol. Sister had MI when she was less than 60 years of age.       Family History:     Family History   Problem Relation Age of Onset     Hypertension Mother      Alzheimer Disease Mother      DIABETES Other      CANCER No family hx of             Allergies:      Allergies   Allergen Reactions     Insulin Swelling     Face and throat swelling             Medications:     Current Outpatient Prescriptions:      carvedilol (COREG) 25 MG tablet, Take 1 tablet (25 mg) by mouth 2 times daily (with meals), Disp: 60 tablet, Rfl: 1    (Not in a hospital admission)            Physical Exam:   BP (!) 230/120 (BP Location: Left arm, Patient Position: Chair, Cuff Size: Adult Regular)  Pulse 60  Ht 1.765 m (5' 9.5\")  Wt 83.7 kg (184 lb 8 oz)  BMI 26.86 kg/m2   Repeat Bp 210/120    Physical Exam:   General: NAD  HEENT: sclera anicteric  Resp: Breathing comfortably, no respiratory distress  CVS: RRR  Skin: warm and dry  Extremities: No edema  Neuro:   Mental status: alert, interactive and alert and oriented x 3   Cranial nerves: intact   Motor: No abnormal posture, tone, atrophy, or movements/fasciculations. FFM normal. No drift in arms or legs    Biceps Triceps Deltoid Hip flexor Knee extension Knee flexion Ankle extension Ankle flexion   Right 5 5 5 5 5 5 5 5 5   Left 5 5 5 5 5 5 5 5 5    Reflexes: Absent Babinski. Absent Yo.   Brachioradialis Biceps Triceps Patellar Achilles   Right 2+ 2+ 2+ 2+ 2+   Left 2+ 2+ 2+ 2+ 2+    Sensory: Intact to light touch,  in all extremities.    Coordination: Intact FNF and heel-shin. No Dysmetria. No Dysdiadochokinesia.   Gait: Normal width, stride length, turn, and arm " swing.    Labs/Studies:  Recent Labs   Lab Test  11/20/17   0342  11/18/17   1759  11/18/17   0728   11/12/17   0740   NA  139  137  137   < >  138   POTASSIUM  4.1  4.5  4.1   < >  4.4   CHLORIDE  106  104  104   < >  112*   CO2  24  26  25   < >  13*   ANIONGAP  9  7  7   < >  13   GLC  231*  189*  145*   < >  217*   BUN  30  32*  34*   < >  73*   CR  2.10*  2.10*  2.16*   < >  2.54*   ALICIA  8.0*  8.3*  8.3*   < >  8.1*   WBC  20.3*  14.4*   --    --   15.3*   RBC  3.61*  3.85*   --    --   3.96*   HGB  10.8*  11.6*   --    --   11.8*   PLT  217  175   --    --   225    < > = values in this interval not displayed.       Recent Labs   Lab Test  11/18/17   1759 11/11/17   1313   INR  1.00  1.03   PTT  29  23       Hemoglobin A1C   Date Value Ref Range Status   11/12/2017 6.7 (H) 4.3 - 6.0 % Final       Imaging  MRI 11/17/17  1. No significant change in posterior right frontal region of  vasogenic edema and gyriform petechial hemorrhage, consistent with  inflammatory cerebral amyloid angiopathy.  2. Few new small scattered foci of acute infarction involving multiple  vascular territories, concerning for embolic phenomenon.  3. Stable small subacute lacunar infarcts involving the right  frontoparietal and posterior left frontal white matter.    US renal 11/15/17  No definite arterial waveforms are noted in the left renal artery or  left arcuate arteries.  This finding taken alone is concerning for an  arterial occlusion.  However, given the presence of normal venous  waveforms that indicate there is blood flowing away from the kidney,  and recent imaging showing relatively normal function (i.e. FDG  uptake) and size of the left kidney, an arterial occlusion as a cause  for the patient's malignant hypertension seems less likely.  Consider  a non contrast MRA for further evaluation.  Alternatively, a repeat  ultrasound could be considered.      PET: 11/14/17  1. There is no abnormal metabolic activity in the region of  focal  hyperdensity demonstrated in the high right frontal lobe of the brain  on CT dated 11/11/2017. Slightly decreased metabolic activity in this  region may be secondary to edema versus encephalomalacia, which is  better characterized on dedicated CT.   2. Perilymphatic pulmonary nodularity seen on CT dated 11/11/2017 is  not well appreciated on the current exam, possibly due in part to  differences in technique. There is no associated increased metabolic  activity in the area of this previously demonstrated nodularity.   3. New mixed pulmonary opacities and intralobular septal thickening  along anterior right and left superior lobes with mild associated  increased FDG avidity, maximum SUV 2.55, possibly representing  aspiration/infection. Recommend short-term follow-up.  4. Small bilateral pleural effusions and trace ascites are not  significantly changed from prior exam.  5. Limited evaluation of the remainder of the abdomen is not optimized  given lack of dedicated CT, but does not appear significantly changed  from prior exam.    CT CAP 11/11  Impression:   1. Bilateral apical predominant perilymphatic distribution pulmonary  nodules and septal thickening can be seen in lymphangitic  carcinomatosis. Mixed groundglass and nodular opacities in the lung  bases and likely infectious or inflammatory in etiology and follow-up  to resolution is recommended with low-dose chest CT in 3 months.  2. Smooth interlobular septal thickening and scattered groundglass  opacities likely representing pulmonary edema.  3. Small bilateral pleural effusions and adjacent basilar atelectasis  and/or consolidation.  4. Small volume ascites of unclear etiology in this noncontrast  examination although may relate to volume overload given soft tissue  anasarca and pulmonary edema.  CT head 11/11  Impression: Small hyperdense focus in the periphery of the high right  frontal lobe may represent intraparenchymal hemorrhage.  Confluent  edema in the high right frontal lobe. Comparison with previous imaging  studies would be likely useful, if available.  Answers for HPI/ROS submitted by the patient on 2/27/2018   General Symptoms: No  Skin Symptoms: No  HENT Symptoms: No  EYE SYMPTOMS: No  HEART SYMPTOMS: No  LUNG SYMPTOMS: No  INTESTINAL SYMPTOMS: No  URINARY SYMPTOMS: No  REPRODUCTIVE SYMPTOMS: No  SKELETAL SYMPTOMS: No  BLOOD SYMPTOMS: No  NERVOUS SYSTEM SYMPTOMS: No  MENTAL HEALTH SYMPTOMS: No

## 2018-02-27 NOTE — ED NOTES
MD aware of BP, pt left prior to discharge review and left without his new blood pressure prescriptions. The front dest [facility] called the pt to come  his prescriptions.

## 2018-02-27 NOTE — ED NOTES
Bed: HWB  Expected date: 2/27/18  Expected time: 11:44 AM  Means of arrival: Car  Comments:  Boy Aguirre ([id number]):  Patient being seen in Neurology Clinic was noted to have BP of 230/120. Patient is prescribed 3 medications for HTN, but discontinued 2 of them on his own decision.

## 2018-02-27 NOTE — ED AVS SNAPSHOT
[facility], Emergency Department    [address] [address]    Phone:  [phone]                                       Boy Aguirre   MRN: [id number]    Department:  [facility], Emergency Department   Date of Visit:  2/27/2018           Patient Information     Date Of Birth          1955        Your diagnoses for this visit were:     Essential hypertension        You were seen by Lex Davis MD.        Discharge Instructions       Continue Coreg.  Resume amiloride as directed.  Begin hydrochlorothiazide as directed    Follow-up with your primary care provider at the end of the week or early next week.    24 Hour Appointment Hotline       To make an appointment at any [facility] clinic, call [phone] ([phone]). If you don't have a family doctor or clinic, we will help you find one. [facility] clinics are conveniently located to serve the needs of you and your family.             Review of your medicines      START taking        Dose / Directions Last dose taken    aMILoride 5 MG tablet   Commonly known as:  MIDAMOR   Dose:  10 mg   Quantity:  60 tablet        Take 2 tablets (10 mg) by mouth daily   Refills:  0        hydrochlorothiazide 12.5 MG capsule   Commonly known as:  MICROZIDE   Dose:  25 mg   Quantity:  60 capsule        Take 2 capsules (25 mg) by mouth daily   Refills:  0          Our records show that you are taking the medicines listed below. If these are incorrect, please call your family doctor or clinic.        Dose / Directions Last dose taken    carvedilol 25 MG tablet   Commonly known as:  COREG   Dose:  25 mg   Quantity:  60 tablet        Take 1 tablet (25 mg) by mouth 2 times daily (with meals)   Refills:  1                Prescriptions were sent or printed at these locations (2 Prescriptions)                   Other Prescriptions                Printed at Department/Unit printer (2 of 2)         aMILoride (MIDAMOR) 5 MG tablet                "hydrochlorothiazide (MICROZIDE) 12.5 MG capsule                Procedures and tests performed during your visit     Basic metabolic panel    EKG 12-lead, tracing only      Orders Needing Specimen Collection     None      Pending Results     No orders found from 2018 to 2018.            Pending Culture Results     No orders found from 2018 to 2018.            Pending Results Instructions     If you had any lab results that were not finalized at the time of your Discharge, you can call the ED Lab Result RN at [phone]. You will be contacted by this team for any positive Lab results or changes in treatment. The nurses are available 7 days a week from 10A to 6:30P.  You can leave a message 24 hours per day and they will return your call.        Thank you for choosing [facility]       Thank you for choosing [facility] for your care. Our goal is always to provide you with excellent care. Hearing back from our patients is one way we can continue to improve our services. Please take a few minutes to complete the written survey that you may receive in the mail after you visit with us. Thank you!        [employer] Information     [employer] lets you send messages to your doctor, view your test results, renew your prescriptions, schedule appointments and more. To sign up, go to [email or web address] . Click on \"Log in\" on the left side of the screen, which will take you to the Welcome page. Then click on \"Sign up Now\" on the right side of the page.     You will be asked to enter the access code listed below, as well as some personal information. Please follow the directions to create your username and password.     Your access code is: [email or web address]  Expires: 2018  3:11 PM     Your access code will  in 90 days. If you need help or a new code, please call your [facility] clinic or [phone].        Care EveryWhere ID     This is your Care EveryWhere ID. This could be used by other organizations to " access your [facility] medical records  [id number]        Equal Access to Services     GLORY BOWMAN : Rhys Kaba, jolie narayanan, karen mclean. So [facility] [phone].    ATENCIÓN: Si habla español, tiene a herman disposición servicios gratuitos de asistencia lingüística. Llame al [phone].    We comply with applicable federal civil rights laws and [place] laws. We do not discriminate on the basis of race, color, national origin, age, disability, sex, sexual orientation, or gender identity.            After Visit Summary       This is your record. Keep this with you and show to your community pharmacist(s) and doctor(s) at your next visit.

## 2018-02-27 NOTE — ED NOTES
Presents from clinic for hypertension. Pt has stopped taking Procardia and a diuretic HTN medication because he didn't like the feeling of procardia and thought he lost all his water weight.

## 2018-02-27 NOTE — ED PROVIDER NOTES
History     Chief Complaint   Patient presents with     Hypertension     HPI  Boy Aguirre is a 62 year old male with history of right cerebral amyloid angiopathy and hypertension who presents to the emergency department from the neurology clinic for evaluation of hypertension.  The patient was discharged from the neurosurgery service late last year on Coreg, nifedipine, and amiloride.  Patient states that December 27 and 29 he developed episodes of palpitations and uneasiness.  The first episode lasted 30 minutes, the second episode lasted 60 minutes.  The patient felt no symptoms are related to nifedipine so he discontinued that medication and had no further symptoms.  The patient also discontinued his amiloride 1 month ago.  In the neurology clinic today, the patient was hypertensive.  He denies any headache, visual disturbance, weakness, numbness, incoordination, or gait disturbance.  He denies any chest pain or dyspnea.  No abdominal pain.  No nausea or vomiting.  The patient expresses displeasure that he is here in the emergency department and not on his way back home.    I have reviewed the Medications, Allergies, Past Medical and Surgical History, and Social History in the Epic system.    Review of Systems   Constitutional: Negative for fever.   HENT: Negative for congestion.    Eyes: Negative for redness.   Respiratory: Negative for shortness of breath.    Cardiovascular: Negative for chest pain.   Gastrointestinal: Negative for abdominal pain.   Genitourinary: Negative for difficulty urinating.   Musculoskeletal: Negative for arthralgias and neck stiffness.   Skin: Negative for color change.   Neurological: Negative for headaches.   Psychiatric/Behavioral: Negative for confusion.   All other systems reviewed and are negative.      Physical Exam   BP: (!) 225/117  Heart Rate: 59  Temp: 98.4  F (36.9  C)  Resp: 18  Weight: 83.7 kg (184 lb 8 oz)  SpO2: 99 %      Physical Exam   Constitutional: He is  oriented to person, place, and time. He appears well-developed and well-nourished. No distress.   HENT:   Head: Normocephalic and atraumatic.   Mouth/Throat: Oropharynx is clear and moist. No oropharyngeal exudate.   Eyes: Pupils are equal, round, and reactive to light. No scleral icterus.   Neck: Normal range of motion.   Cardiovascular: Normal rate, regular rhythm, normal heart sounds and intact distal pulses.    Pulmonary/Chest: Effort normal and breath sounds normal. No respiratory distress.   Abdominal: Soft. Bowel sounds are normal. There is no tenderness.   Musculoskeletal: Normal range of motion. He exhibits no edema or tenderness.   Neurological: He is alert and oriented to person, place, and time. He has normal strength. No cranial nerve deficit. Coordination and gait normal.   Skin: Skin is warm and dry. No rash noted. He is not diaphoretic.   Nursing note and vitals reviewed.      ED Course     ED Course     Procedures             EKG Interpretation:      Interpreted by MARIE DOMINGUEZ MD  Time reviewed: 1300  Symptoms at time of EKG: None   Rhythm: sinus bradycardia  Rate: 56  Axis: Normal  Ectopy: none  Conduction: normal  ST Segments/ T Waves: No ST-T wave changes and No acute ischemic changes  Q Waves: none  Comparison to prior: Previously normal sinus rhythm on 11/16/17    Clinical Impression: non-specific EKG    Results for orders placed or performed during the hospital encounter of 02/27/18 (from the past 24 hour(s))   Basic metabolic panel   Result Value Ref Range    Sodium 141 133 - 144 mmol/L    Potassium 4.2 3.4 - 5.3 mmol/L    Chloride 106 94 - 109 mmol/L    Carbon Dioxide 27 20 - 32 mmol/L    Anion Gap 8 3 - 14 mmol/L    Glucose 136 (H) 70 - 99 mg/dL    Urea Nitrogen 23 7 - 30 mg/dL    Creatinine 2.23 (H) 0.66 - 1.25 mg/dL    GFR Estimate 30 (L) >60 mL/min/1.7m2    GFR Estimate If Black 36 (L) >60 mL/min/1.7m2    Calcium 8.8 8.5 - 10.1 mg/dL              Critical Care time:       Discussed with  Nephrology- Dr. Ortiz.  Labs and previous evaluation including question of left renal artery stenosis reviewed.  Recommendation made to continue Coreg, restart amiloride, and add hydrochlorothiazide 25 mg daily.         Assessments & Plan (with Medical Decision Making)   62 year old male with history of hypertension who recently discontinued his antihypertensives except for Coreg.  The patient does not have any symptoms here in the emergency department.  His EKG does not have any ischemic change.  His metabolic panel reveals a recurrent elevation of his creatinine without other electrolyte abnormality.  The patient has been seen by nephrology in the past when he was an inpatient due to his hypertension.  The patient feels nifedipine caused him some side effects of palpitations and feeling unwell.  After discussion with nephrology, recommendation was for patient to continue Coreg, restart me in the right, and initiate hydrochlorothiazide.  Prescriptions written for the patient.  Primary care follow-up recommended within the next 3-5 days for recheck.    I have reviewed the nursing notes.    I have reviewed the findings, diagnosis, plan and need for follow up with the patient.    Discharge Medication List as of 2/27/2018  3:11 PM      START taking these medications    Details   aMILoride (MIDAMOR) 5 MG tablet Take 2 tablets (10 mg) by mouth daily, Disp-60 tablet, R-0, Local Print      hydrochlorothiazide (MICROZIDE) 12.5 MG capsule Take 2 capsules (25 mg) by mouth daily, Disp-60 capsule, R-0, Local Print             Final diagnoses:   Essential hypertension       2/27/2018   [facility] [place], EMERGENCY DEPARTMENT     Lex Davis MD  02/27/18 0539

## 2018-02-27 NOTE — PATIENT INSTRUCTIONS
Blood pressure is 230/120  Repeat Bp 210/120  Blood pressure uncontrolled due to driving 500miles and not taking all your bp meds.  You will go to the ED to get your blood pressure controled  Adhere to your home blood pressure medications  MRI stroke protocol before next followup  zio patch for 14 days  Follow [facility]  [facility] [place] [facility].

## 2018-02-27 NOTE — DISCHARGE INSTRUCTIONS
Continue Coreg.  Resume amiloride as directed.  Begin hydrochlorothiazide as directed    Follow-up with your primary care provider at the end of the week or early next week.

## 2018-02-27 NOTE — ED AVS SNAPSHOT
[facility], [place], Emergency Department    [address] [address]    Phone:  [phone]                                       Boy Aguirre   MRN: [id number]    Department:  [facility], Emergency Department   Date of Visit:  2/27/2018           After Visit Summary Signature Page     I have received my discharge instructions, and my questions have been answered. I have discussed any challenges I see with this plan with the nurse or doctor.    ..........................................................................................................................................  Patient/Patient Representative Signature      ..........................................................................................................................................  Patient Representative Print Name and Relationship to Patient    ..................................................               ................................................  Date                                            Time    ..........................................................................................................................................  Reviewed by Signature/Title    ...................................................              ..............................................  Date                                                            Time

## 2018-02-27 NOTE — MR AVS SNAPSHOT
After Visit Summary   2/27/2018    Boy Aguirre    MRN: [id number]           Patient Information     Date Of Birth          1955        Visit Information        Provider Department      2/27/2018 11:00 AM Dave Jon MD [facility] Neurology        Today's Diagnoses     Cerebrovascular accident (CVA), unspecified mechanism (H)    -  1      Care Instructions    Blood pressure is 230/120  Repeat Bp 210/120  Blood pressure uncontrolled due to driving 500miles and not taking all your bp meds.  You will go to the ED to get your blood pressure controled  Adhere to your home blood pressure medications  MRI stroke protocol before next followup  zio patch for 14 days  [facility]              Follow-ups after your visit        Follow-up notes from your care team     Return in about 3 months (around 5/27/2018).      Future tests that were ordered for you today     Open Future Orders        Priority Expected Expires Ordered    Zio Patch Holter Routine  4/13/2018 2/27/2018    MR Brain for Stroke Cmpl w/o & w Contras Routine  2/27/2019 2/27/2018            Who to contact     Please call your clinic at [phone] to:    Ask questions about your health    Make or cancel appointments    Discuss your medicines    Learn about your test results    Speak to your doctor            Additional Information About Your Visit        MyChart Information     [employer]seed is an electronic gateway that provides easy, online access to your medical records. With Airseed, you can request a clinic appointment, read your test results, renew a prescription or communicate with your care team.     To sign up for [employer] visit the website at [email or web address]   You will be asked to enter the access code listed below, as well as some personal information. Please follow the directions to create your username and password.     Your access code is: [email or web address]  Expires:  "2018  3:11 PM     Your access code will  in 90 days. If you need help or a new code, please contact your [facility] or call [phone] for assistance.        Care EveryWhere ID     This is your Care EveryWhere ID. This could be used by other organizations to access your [place] medical records  [id number]        Your Vitals Were     Pulse Height BMI (Body Mass Index)             60 1.765 m (5' 9.5\") 26.86 kg/m2          Blood Pressure from Last 3 Encounters:   18 (!) 229/129   18 (!) 230/120   17 144/79    Weight from Last 3 Encounters:   18 83.7 kg (184 lb 8 oz)   18 83.7 kg (184 lb 8 oz)   17 79 kg (174 lb 2.6 oz)                 Today's Medication Changes          These changes are accurate as of 18 11:59 PM.  If you have any questions, ask your nurse or doctor.               Stop taking these medicines if you haven't already. Please contact your care team if you have questions.     NIFEdipine ER 60 MG 24 hr tablet   Commonly known as:  ADALAT CC   Stopped by:  Dave Jon MD                    Primary Care Provider Fax #    Physician No Ref-Primary [phone]       No address on file        Equal Access to Services     [employer]: Hadmarlene meng hadasho Sosamara, waaxda luqadaha, qaybta kaalmada adeegyaivy, karen rivera . So [facility] [phone].    ATENCIÓN: Si habla español, tiene a herman disposición servicios gratuitos de asistencia lingüística. Llame al [phone].    We comply with applicable federal civil rights laws and [place] laws. We do not discriminate on the basis of race, color, national origin, age, disability, sex, sexual orientation, or gender identity.            Thank you!     Thank you for choosing [facility] NEUROLOGY  for your care. Our goal is always to provide you with excellent care. Hearing back from our patients is one way we can continue to improve our " services. Please take a few minutes to complete the written survey that you may receive in the mail after your visit with us. Thank you!             Your Updated Medication List - Protect others around you: Learn how to safely use, store and throw away your medicines at www.disposemymeds.org.          This list is accurate as of 2/27/18 11:59 PM.  Always use your most recent med list.                   Brand Name Dispense Instructions for use Diagnosis    aMILoride 5 MG tablet    MIDAMOR    60 tablet    Take 2 tablets (10 mg) by mouth daily        carvedilol 25 MG tablet    COREG    60 tablet    Take 1 tablet (25 mg) by mouth 2 times daily (with meals)    Hypertension, unspecified type       hydrochlorothiazide 12.5 MG capsule    MICROZIDE    60 capsule    Take 2 capsules (25 mg) by mouth daily

## 2018-02-28 LAB — INTERPRETATION ECG - MUSE: NORMAL

## 2018-03-01 ENCOUNTER — CARE COORDINATION (OUTPATIENT)
Dept: NEUROLOGY | Facility: CLINIC | Age: 63
End: 2018-03-01

## 2018-03-01 NOTE — LETTER
March 23, 2018    Boy Aguirre  [address]   [place] [address]    Dear Mr. Aguirre,    We have been unable to reach you by phone to follow-up on your 2/27/18 visit with Dr. rAt.     Dr. Art would like you to have additional imaging (Brain MRI) that you may obtain locally. An order will need to be sent to the imaging facility, so please contact us and let us know where you would like to have this done.     Dr. Art would also like you to have ambulatory cardiac monitoring for 14 days. This is a patch that is worn on your chest, called Ziopatch. We will mail this out to you. You may apply it yourself with the instructions enclosed with the patch. You will then send it in to [employer], the , after you have completed the 14 days of monitoring. Again, these instructions will be included with the monitor.      Also, Dr. Art would like to follow-up with you in 3-4 months. We will contact you, in the next month or so, to schedule this appointment.    If you have questions, please contact me directly at [phone].    Sincerely,        Robel Sheppard RN, CNRN  Stroke & Endovascular Care Coordinator

## 2018-03-01 NOTE — PROGRESS NOTES
Patient sent to ED following visit with Dr. Art on 2/27/18 for hypertension. Amiloride and HCTZ initiated in the ED. Patient to continue coreg. Patient did not have ziopatch placed. He will also need MRI stroke protocol (non-urgent), and follow-up with Dr. Art in 3-4 months. 2/27/18 clinic note will need to be sent to primary care provider (none listed).     LV for patient to return call x 3     3/23 - letter sent. Message to cardiology to mail [place].

## 2018-03-26 ENCOUNTER — DOCUMENTATION ONLY (OUTPATIENT)
Dept: CARDIOLOGY | Facility: CLINIC | Age: 63
End: 2018-03-26

## 2018-03-26 NOTE — PROGRESS NOTES
Per patient request, on March 26, 2018 an order was faxed to [facility] requesting a Zio patch to be mailed to the patient. The patient is to wear the zio 14 days starting ASAP.    Jane Whitten  Periop Electrophysiology   [phone]

## 2018-03-29 ENCOUNTER — ALLIED HEALTH/NURSE VISIT (OUTPATIENT)
Dept: CARDIOLOGY | Facility: CLINIC | Age: 63
End: 2018-03-29
Attending: INTERNAL MEDICINE

## 2018-03-29 DIAGNOSIS — I63.9 CEREBROVASCULAR ACCIDENT (CVA), UNSPECIFIED MECHANISM (H): ICD-10-CM

## 2018-03-29 NOTE — MR AVS SNAPSHOT
"              After Visit Summary   3/29/2018    Boy Aguirre    MRN: [id number]           Patient Information     Date Of Birth          1955        Visit Information        Provider Department      3/29/2018 7:00 AM Tech, [facility] Monitor, [facility]        Today's Diagnoses     Cerebrovascular accident (CVA), unspecified mechanism (H)           Follow-ups after your visit        Who to contact     If you have questions or need follow up information about today's clinic visit or your schedule please contact [facility] directly at [phone].  Normal or non-critical lab and imaging results will be communicated to you by [employer], letter or phone within 4 business days after the clinic has received the results. If you do not hear from us within 7 days, please contact the clinic through [employer] or phone. If you have a critical or abnormal lab result, we will notify you by phone as soon as possible.  Submit refill requests through [employer] or call your pharmacy and they will forward the refill request to us. Please allow 3 business days for your refill to be completed.          Additional Information About Your Visit        MyChart Information     [employer] lets you send messages to your doctor, view your test results, renew your prescriptions, schedule appointments and more. To sign up, go to [email or web address] . Click on \"Log in\" on the left side of the screen, which will take you to the Welcome page. Then click on \"Sign up Now\" on the right side of the page.     You will be asked to enter the access code listed below, as well as some personal information. Please follow the directions to create your username and password.     Your access code is: [email or web address]  Expires: 2018  4:11 PM     Your access code will  in 90 days. If you need help or a new code, please call your [place] clinic or [phone].        Care EveryWhere ID     This is your Care EveryWhere ID. This could be " used by other organizations to access your [place] medical records  [id number]         Blood Pressure from Last 3 Encounters:   02/27/18 (!) 229/129   02/27/18 (!) 230/120   11/22/17 144/79    Weight from Last 3 Encounters:   02/27/18 83.7 kg (184 lb 8 oz)   02/27/18 83.7 kg (184 lb 8 oz)   11/19/17 79 kg (174 lb 2.6 oz)              We Performed the Following     Zio Patch Holter        Primary Care Provider Fax #    Physician No Ref-Primary [phone]       No address on file        Equal Access to Services     [facility] : Hadii saeed Kaba, wamartín narayanan, starla kaalmaivy hensley, karen rivera . So [facility] [phone].    ATENCIÓN: Si habla español, tiene a herman disposición servicios gratuitos de asistencia lingüística. [facility] [phone].    We comply with applicable federal civil rights laws and [place] laws. We do not discriminate on the basis of race, color, national origin, age, disability, sex, sexual orientation, or gender identity.            Thank you!     Thank you for choosing [facility]  for your care. Our goal is always to provide you with excellent care. Hearing back from our patients is one way we can continue to improve our services. Please take a few minutes to complete the written survey that you may receive in the mail after your visit with us. Thank you!             Your Updated Medication List - Protect others around you: Learn how to safely use, store and throw away your medicines at www.disposemymeds.org.          This list is accurate as of 3/29/18 11:59 PM.  Always use your most recent med list.                   Brand Name Dispense Instructions for use Diagnosis    aMILoride 5 MG tablet    MIDAMOR    60 tablet    Take 2 tablets (10 mg) by mouth daily        carvedilol 25 MG tablet    COREG    60 tablet    Take 1 tablet (25 mg) by mouth 2 times daily (with meals)    Hypertension, unspecified type       hydrochlorothiazide 12.5  MG capsule    MICROZIDE    60 capsule    Take 2 capsules (25 mg) by mouth daily

## 2018-04-12 NOTE — PROGRESS NOTES
"Patient states he is doing well - feels healthier now than in November. Denies stroke symptoms since last visit. Ziopatch was mailed to patient on 3/29/18. Patient states he did not receive it and will not wear it as \"there is nothing wrong with his heart.\" Patient states he is not taking, nor did he ever start taking, Amiloride and HCTZ because he didn't get the prescriptions filled. He is only taking Coreg 25 mg twice daily. He has been monitoring BP, which he reports has been 125-135/70-90. He states it has not been higher than 140/90. [facility], Dr. Hill, there (her nurse is Jovana). However, he has not seen Dr. Hill since July of last year and is uncertain if [facility] still [facility] the [facility]. [facility] They do not have patients records from [facility] and were unaware of the new medications that were initiated.     Spoke with Dr. Liz Whaley's nurse, who will assist in coordinating imaging for patient. Records and MRI order faxed to Jovana at [phone].     Will follow-up early May to see if imaging has been completed (msg sent).   "

## 2018-04-25 ENCOUNTER — TRANSFERRED RECORDS (OUTPATIENT)
Dept: HEALTH INFORMATION MANAGEMENT | Facility: CLINIC | Age: 63
End: 2018-04-25

## 2018-04-27 ENCOUNTER — TELEPHONE (OUTPATIENT)
Dept: NEUROLOGY | Facility: CLINIC | Age: 63
End: 2018-04-27

## 2018-04-27 NOTE — TELEPHONE ENCOUNTER
Received [facility] from [facility] at [facility] stating patients [facility] was completed and need fax number to fax report.    [facility] for Jovaan asking for an update on patients status - if Dr. Hill has seen him yet. Also, left fax number and requested imaging be sent to us.

## 2018-05-02 ENCOUNTER — TRANSFERRED RECORDS (OUTPATIENT)
Dept: HEALTH INFORMATION MANAGEMENT | Facility: CLINIC | Age: 63
End: 2018-05-02

## 2018-05-04 ENCOUNTER — TRANSFERRED RECORDS (OUTPATIENT)
Dept: HEALTH INFORMATION MANAGEMENT | Facility: CLINIC | Age: 63
End: 2018-05-04

## 2018-05-09 NOTE — TELEPHONE ENCOUNTER
Spoke with Jovana who states images were sent last week/early this week. Also patient has changed MD's. He saw new provider last week. Jovana will fax records.

## 2018-05-10 NOTE — TELEPHONE ENCOUNTER
Patient states he is unable to attend appointment as he has a cold. We will contact him to reschedule when Dr. Art's schedule becomes available. Patient aware and in agreement.

## 2018-05-18 ENCOUNTER — CARE COORDINATION (OUTPATIENT)
Dept: NEUROLOGY | Facility: CLINIC | Age: 63
End: 2018-05-18

## 2018-05-18 NOTE — PROGRESS NOTES
[facility]. Sent to film room.     Imaging in PACS - requested to merge. Message sent to Dr. Art to inform.

## 2018-07-11 ENCOUNTER — TELEPHONE (OUTPATIENT)
Dept: NEUROLOGY | Facility: CLINIC | Age: 63
End: 2018-07-11

## 2018-07-11 NOTE — TELEPHONE ENCOUNTER
Patient states that his brother will be in town this weekend and will not be able to come to appointment on 7/13. Appointment on 8/3/18 at 11:00 will work. Patient will plan to come at that time. Patient has no questions at this time. Message sent to scheduling to make appointment. Patient has my contact information and was encouraged to call with questions/concerns.

## 2018-07-11 NOTE — TELEPHONE ENCOUNTER
"----- Message from Romina Singh sent at 7/9/2018  1:45 PM CDT -----  Regarding: Kaelyn [address]pt  Contact: [phone]  Praveen Huston,    Called to this patient to try to get them onto Kaelyn's schedule for Friday. Patient cannot get a ride here by then, but questions if this appointment is actually needed and believes it is holding up their transgender surgery. Patient would like to talk with you if at all possible. Goes by \"Priya\".    Thank You,  Romina"

## 2018-07-30 ENCOUNTER — MEDICAL CORRESPONDENCE (OUTPATIENT)
Dept: HEALTH INFORMATION MANAGEMENT | Facility: CLINIC | Age: 63
End: 2018-07-30

## 2018-07-30 ENCOUNTER — TELEPHONE (OUTPATIENT)
Dept: NEUROLOGY | Facility: CLINIC | Age: 63
End: 2018-07-30

## 2018-07-30 NOTE — TELEPHONE ENCOUNTER
[facility] states that [facility] with Dr[facility] Art on 8/3/18. They will be faxing referral.

## 2018-08-03 ENCOUNTER — HOSPITAL ENCOUNTER (EMERGENCY)
Facility: CLINIC | Age: 63
Discharge: HOME | End: 2018-08-03
Attending: EMERGENCY MEDICINE | Admitting: EMERGENCY MEDICINE
Payer: COMMERCIAL

## 2018-08-03 ENCOUNTER — ALLIED HEALTH/NURSE VISIT (OUTPATIENT)
Dept: CARDIOLOGY | Facility: CLINIC | Age: 63
End: 2018-08-03
Attending: PSYCHIATRY & NEUROLOGY

## 2018-08-03 ENCOUNTER — TELEPHONE (OUTPATIENT)
Dept: NEUROLOGY | Facility: CLINIC | Age: 63
End: 2018-08-03

## 2018-08-03 ENCOUNTER — OFFICE VISIT (OUTPATIENT)
Dept: NEUROLOGY | Facility: CLINIC | Age: 63
End: 2018-08-03
Payer: COMMERCIAL

## 2018-08-03 VITALS
SYSTOLIC BLOOD PRESSURE: 174 MMHG | OXYGEN SATURATION: 100 % | TEMPERATURE: 98.6 F | BODY MASS INDEX: 24.9 KG/M2 | HEIGHT: 70 IN | DIASTOLIC BLOOD PRESSURE: 134 MMHG | RESPIRATION RATE: 16 BRPM | WEIGHT: 173.9 LBS | HEART RATE: 98 BPM

## 2018-08-03 VITALS
HEIGHT: 70 IN | BODY MASS INDEX: 24.91 KG/M2 | SYSTOLIC BLOOD PRESSURE: 207 MMHG | RESPIRATION RATE: 20 BRPM | DIASTOLIC BLOOD PRESSURE: 144 MMHG | OXYGEN SATURATION: 98 % | WEIGHT: 174 LBS | HEART RATE: 98 BPM

## 2018-08-03 DIAGNOSIS — E78.5 HYPERLIPIDEMIA LDL GOAL <100: ICD-10-CM

## 2018-08-03 DIAGNOSIS — I63.9 CEREBROVASCULAR ACCIDENT (CVA), UNSPECIFIED MECHANISM (H): Primary | ICD-10-CM

## 2018-08-03 DIAGNOSIS — I63.9 CEREBROVASCULAR ACCIDENT (CVA), UNSPECIFIED MECHANISM (H): ICD-10-CM

## 2018-08-03 DIAGNOSIS — I10 HYPERTENSION, UNSPECIFIED TYPE: ICD-10-CM

## 2018-08-03 DIAGNOSIS — I10 ESSENTIAL HYPERTENSION: ICD-10-CM

## 2018-08-03 LAB
ALBUMIN SERPL-MCNC: 4.3 G/DL (ref 3.4–5)
ALP SERPL-CCNC: 121 U/L (ref 40–150)
ALT SERPL W P-5'-P-CCNC: 16 U/L (ref 0–70)
ANION GAP SERPL CALCULATED.3IONS-SCNC: 7 MMOL/L (ref 3–14)
AST SERPL W P-5'-P-CCNC: 14 U/L (ref 0–45)
BILIRUB DIRECT SERPL-MCNC: 0.1 MG/DL (ref 0–0.2)
BILIRUB SERPL-MCNC: 0.7 MG/DL (ref 0.2–1.3)
BUN SERPL-MCNC: 16 MG/DL (ref 7–30)
CALCIUM SERPL-MCNC: 9.4 MG/DL (ref 8.5–10.1)
CHLORIDE SERPL-SCNC: 102 MMOL/L (ref 94–109)
CHOLEST SERPL-MCNC: 206 MG/DL
CO2 SERPL-SCNC: 28 MMOL/L (ref 20–32)
CREAT SERPL-MCNC: 1.98 MG/DL (ref 0.66–1.25)
CRP SERPL-MCNC: 3.2 MG/L (ref 0–8)
ERYTHROCYTE [DISTWIDTH] IN BLOOD BY AUTOMATED COUNT: 12.2 % (ref 10–15)
ERYTHROCYTE [SEDIMENTATION RATE] IN BLOOD BY WESTERGREN METHOD: 28 MM/H (ref 0–20)
GFR SERPL CREATININE-BSD FRML MDRD: 35 ML/MIN/1.7M2
GLUCOSE SERPL-MCNC: 136 MG/DL (ref 70–99)
HBA1C MFR BLD: 6.6 % (ref 0–5.6)
HCT VFR BLD AUTO: 45.3 % (ref 40–53)
HDLC SERPL-MCNC: 26 MG/DL
HGB BLD-MCNC: 15.2 G/DL (ref 13.3–17.7)
LDLC SERPL CALC-MCNC: 129 MG/DL
MCH RBC QN AUTO: 31.3 PG (ref 26.5–33)
MCHC RBC AUTO-ENTMCNC: 33.6 G/DL (ref 31.5–36.5)
MCV RBC AUTO: 93 FL (ref 78–100)
NONHDLC SERPL-MCNC: 181 MG/DL
PLATELET # BLD AUTO: 250 10E9/L (ref 150–450)
POTASSIUM SERPL-SCNC: 3.9 MMOL/L (ref 3.4–5.3)
PROT SERPL-MCNC: 8.6 G/DL (ref 6.8–8.8)
RBC # BLD AUTO: 4.86 10E12/L (ref 4.4–5.9)
SODIUM SERPL-SCNC: 138 MMOL/L (ref 133–144)
TRIGL SERPL-MCNC: 258 MG/DL
WBC # BLD AUTO: 10.8 10E9/L (ref 4–11)

## 2018-08-03 PROCEDURE — 99282 EMERGENCY DEPT VISIT SF MDM: CPT | Mod: Z6 | Performed by: EMERGENCY MEDICINE

## 2018-08-03 PROCEDURE — 99282 EMERGENCY DEPT VISIT SF MDM: CPT | Performed by: EMERGENCY MEDICINE

## 2018-08-03 PROCEDURE — 0298T ZZC EXT ECG > 48HR TO 21 DAY REVIEW AND INTERPRETATN: CPT | Performed by: INTERNAL MEDICINE

## 2018-08-03 PROCEDURE — 0296T ZIO PATCH HOLTER: CPT | Mod: ZF

## 2018-08-03 RX ORDER — CARVEDILOL 25 MG/1
25 TABLET ORAL 2 TIMES DAILY WITH MEALS
Qty: 60 TABLET | Refills: 1 | Status: SHIPPED | OUTPATIENT
Start: 2018-08-03

## 2018-08-03 RX ORDER — AMILORIDE HYDROCHLORIDE 5 MG/1
10 TABLET ORAL DAILY
Qty: 60 TABLET | Refills: 0 | Status: SHIPPED | OUTPATIENT
Start: 2018-08-03

## 2018-08-03 RX ORDER — HYDROCHLOROTHIAZIDE 12.5 MG/1
25 CAPSULE ORAL DAILY
Qty: 60 CAPSULE | Refills: 0 | Status: SHIPPED | OUTPATIENT
Start: 2018-08-03

## 2018-08-03 ASSESSMENT — PAIN SCALES - GENERAL: PAINLEVEL: NO PAIN (1)

## 2018-08-03 NOTE — LETTER
8/3/2018      RE: Boy Aguirre  [address]  [facility] [address]       [facility]  Stroke Clinic  8/3/2018      Boy Aguirre MRN# [id number]   YOB: 1955 Age: 63 year old       Primary care provider: Mckay Hill    Reason for Clinic Visit:RECHECK ([facility] RETURN - F/U)           Assessment and Recommendations:    Boy Aguirre is a 61 yo m  With pmhx of m to f transgender  CKD, poorly controlled HTN, ?DMII, Rt frontal aggressive cerebral amyloid angiopathy with vasogenic edema (Bx confirmed amyloid but without inflammatory component) Nov 2017, multiple subcortical signal abn on MRI likely related to the inflammatory cerebral amyloid angiopathy vs subcortical strokes. He was managed with blood pressure control and avoiding antiplatelet use. Had a follow-up MRI April 2018 which showed resolution of the Rt frontal edema, significant subcortical periventricular ischemic changes, there are areas of probably new subacute stroke again occurring in the periventricular subcortical locations including left splenium. He have been clinically asymptomatic with normal neurological exam. These subcortical findings may represent a normal phenomena seen sometimes with ICH, but other differential included, small vessel disease(lacunes), vasculitis (unlikely due to being asymptomatic and normal inflammatory markers)  Or embolic stroke.   His compliance with his medications is questionable. During the clinic today his blood pressure was extremely elevated 210/144, so I asked that he be seen in the ED for this.    Dx:  - Uncontrolled HTN  - Bi-hemispheric subcortical periventricular white matter signal abnormality differential include: normal phenomena associated with initial ICH, or  ischemic stroke likely secondary to small vessel disease due poorly controled risk factors, or CNS vasculitis (unlikely becasue patient is asymptomatic and no signs of inflammation ) or  embolic.   - Multiple Rt frontal focal microhemorrhages. Dx: cerebral amyloid angiopathy confirmed by biopsy.    Plan:  - Referred to the ED for elevated Bp  - Bp long term target < 130/80  - Take blood pressure daily and write it down  - Patient will need close blood pressure followup by his PCP  - Continue to avoid ASA due to dx of cerebral amyloid angiopathy. Avoiding ASA is a relative contraindication due to risk of bleeding. However in future followups I will consider starting patient on low dose of ASA 81mg or cilostazole 100mg bid (CSPS 2 trial 2010, noninferior than ASA in Asians and safer hemorrhagic risk than ASA) if he new strokes.  - MRA (head/neck) to revaluate the cerebral vessels (vasculitis in the differential however is probably unlikely), MRI brain   - zio patch (embolic stroke is in the differential, however is probably unlikely too)  - Inflammatory and autoimmune screen  - Lipid panel, A1C  - Will followup in 6 months and as needed.     Kiera Art MD  Stroke Neurology         History of Present Illness:     History is obtained from the patient and/or family and medical record.    Boy Aguirre is a 63 year old male with a history of m--> F transgender, off hormones for several year, poorly controlled HTN due to poor compliance, mild DMII, Rt frontal aggressive cerebral amyloid angiopathy with vasogenic edema (Bx confirmed amyloid but without inflammatory component), multiple subcortical signal abn on MRI likely related to the inflammatory cerebral amyloid angiopathy vs subcortical strokes. He was managed with blood pressure control and avoiding antiplatelet use. Had a follow-up MRI April 2018 which showed resolution of the Rt frontal edema, significant subcortical periventricular ischemic changes, there was areas of probably new subacute stroke again occurring in the periventricular subcortical locations including left splenium.    From I last saw the patient in the month of February he  denies any neurological symptoms. He was doing well. 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 Able to perform all his activities independently. Drives a care. He admits taking his medications regularly and keeps his PCP appointment. He admits that his blood pressure is almost always < 120/80 at home. He explains his elevated blood pressure today of 207/144 that he drove yesterday 6 hour to reach here and didn't take his morning coreg.     He denies headache, weakness, sensory symptoms, gait issues, visual problems, no dizziness, no dysphagia and no difficulty with language. He is planning to drive back home today which is more than 6 hours away.     He denies depression. No sleep apnea symptoms.            Past Medical History:     Past Medical History:   Diagnosis Date     Male-to-female transgender person               Social History:     Social History     Social History     Marital status: Single     Spouse name: N/A     Number of children: N/A     Years of education: N/A     Occupational History     Not on file.     Social History Main Topics     Smoking status: Former Smoker     Packs/day: 1.00     Years: 5.00     Types: Cigarettes     Smokeless tobacco: Former User     Quit date: 11/13/1976      Comment: Smoked from age [age], but not since     Alcohol use Yes      Comment: Very rare beer     Drug use: No     Sexual activity: Not Currently     Partners: Female     Other Topics Concern     Parent/Sibling W/ Cabg, Mi Or Angioplasty Before 65f 55m? 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  Remote former smoking history.  Very rare etoh.             Family History:     Family History   Problem Relation Age of Onset     Hypertension Mother      Alzheimer Disease Mother      Diabetes Other      Cancer No family hx of             Allergies:      Allergies   Allergen Reactions     Insulin Swelling     Face and throat swelling             Medications:  "    Current Outpatient Prescriptions:      carvedilol (COREG) 25 MG tablet, Take 1 tablet (25 mg) by mouth 2 times daily (with meals), Disp: 60 tablet, Rfl: 1     Niacin (VITAMIN B-3 OR), , Disp: , Rfl:      aMILoride (MIDAMOR) 5 MG tablet, Take 2 tablets (10 mg) by mouth daily, Disp: 60 tablet, Rfl: 0     hydrochlorothiazide (MICROZIDE) 12.5 MG capsule, Take 2 capsules (25 mg) by mouth daily, Disp: 60 capsule, Rfl: 0    (Not in a hospital admission)              Physical Exam:   BP (!) 207/144 (BP Location: Right arm, Patient Position: Sitting, Cuff Size: Adult Regular)  Pulse 98  Resp 20  Ht 1.765 m (5' 9.5\")  Wt 78.9 kg (174 lb)  SpO2 98%  BMI 25.33 kg/m2   Bp end of clinic 210/130    Physical Exam:   General: NAD  HEENT: sclera anicteric  Resp: Breathing comfortably, no respiratory distress  CVS: RRR  Skin: warm and dry  Extremities: No edema    Neurologic Exam   Score    Level of consciousness: (0)   Alert, keenly responsive    LOC questions: (0)   Answers both questions correctly    LOC commands: (0)   Performs both tasks correctly    Best gaze: (0)   Normal    Visual: (0)   No visual loss    Facial palsy: (0)   Normal symmetrical movements    Motor arm (left): (0)   No drift    Motor arm (right): (0)   No drift    Motor leg (left): (0)   No drift    Motor leg (right): (0)   No drift    Limb ataxia: (0)   Absent    Sensory: (0)   Normal- no sensory loss    Best language: (0)   Normal- no aphasia    Dysarthria: (0)   Normal    Extinction and inattention: (0)   No abnormality        NIHSS Total Score:  0         Mental status: alert, interactive and alert and oriented x 3  Cranial nerves: intact  Motor: No abnormal posture, tone, atrophy, or movements/fasciculations.    Biceps Triceps Deltoid Hip flexor Knee extension Knee flexion Ankle extension Ankle flexion   Right 5 5 5 5 5 5 5 5 5   Left 5 5 5 5 5 5 5 5 5   Sensory: Intact to light touch in all extremities.  Coordination: Intact FNF and heel-shin. " No Dysmetria. No Dysdiadochokinesia.  Gait: Normal width, stride length, turn, and arm swing.    Labs/Studies:  Recent Labs   Lab Test  02/27/18   1327  11/20/17   0342  11/18/17   1759   11/12/17   0740   NA  141  139  137   < >  138   POTASSIUM  4.2  4.1  4.5   < >  4.4   CHLORIDE  106  106  104   < >  112*   CO2  27  24  26   < >  13*   ANIONGAP  8  9  7   < >  13   GLC  136*  231*  189*   < >  217*   BUN  23  30  32*   < >  73*   CR  2.23*  2.10*  2.10*   < >  2.54*   ALICIA  8.8  8.0*  8.3*   < >  8.1*   WBC   --   20.3*  14.4*   --   15.3*   RBC   --   3.61*  3.85*   --   3.96*   HGB   --   10.8*  11.6*   --   11.8*   PLT   --   217  175   --   225    < > = values in this interval not displayed.       Recent Labs   Lab Test  11/18/17   1759 11/11/17   1313   INR  1.00  1.03   PTT  29  23       Hemoglobin A1C   Date Value Ref Range Status   11/12/2017 6.7 (H) 4.3 - 6.0 % Final       Imaging:  Reviewed    Kiera Art MD

## 2018-08-03 NOTE — ED TRIAGE NOTES
Pt arrives to ED from clinic with complaints of elevated blood pressure. Pt forgot BP meds at home, pt is from [place]. Pt denies any headache, pain or lightheaded ness. A&O. /140 in triage. Ziopatch placed in clinic.

## 2018-08-03 NOTE — MR AVS SNAPSHOT
After Visit Summary   8/3/2018    Boy Aguirre    MRN: [id number]           Patient Information     Date Of Birth          1955        Visit Information        Provider Department      8/3/2018 11:00 AM Kiera Art MD [facility] Neurology        Today's Diagnoses     Cerebrovascular accident (CVA), unspecified mechanism (H)    -  1      Care Instructions    - Continue to avoid ASA for now due to risk of bleeding with cerebral amyloid angiopathy confirmed by biopsy  - Refer to the ED for extremely high blood pressure and to help in Blood pressure control before he leaves to [facility].  - Blood pressure long terget < 130/80  - HBA1C target < 7  - F/u MRI brain with MRA (recurrent stroke on prior MRI)  - Inflammatory markers, autoimmune markers  - avoid estrogen  - zio patch          Follow-ups after your visit        Follow-up notes from your care team     Return in about 6 months (around 2/3/2019).      Who to contact     Please call your clinic at [phone] to:    Ask questions about your health    Make or cancel appointments    Discuss your medicines    Learn about your test results    Speak to your doctor            Additional Information About Your Visit        MyChart Information     [employer] is an electronic gateway that provides easy, online access to your medical records. With [employer], you can request a clinic appointment, read your test results, renew a prescription or communicate with your care team.     To sign up for [employer] visit the website at [email or web address]   You will be asked to enter the access code listed below, as well as some personal information. Please follow the directions to create your username and password.     Your access code is: [email or web address]  Expires: 10/18/2018  6:30 AM     Your access code will  in 90 days. If you need help or a new code, please contact your [facility] or call [phone] for  "assistance.        Care EveryWhere ID     This is your Care EveryWhere ID. This could be used by other organizations to access your [place] medical records  [id number]        Your Vitals Were     Pulse Respirations Height Pulse Oximetry BMI (Body Mass Index)       98 20 1.765 m (5' 9.5\") 98% 25.33 kg/m2        Blood Pressure from Last 3 Encounters:   08/03/18 (!) 174/134   08/03/18 (!) 207/144   02/27/18 (!) 229/129    Weight from Last 3 Encounters:   08/03/18 78.9 kg (173 lb 14.4 oz)   08/03/18 78.9 kg (174 lb)   02/27/18 83.7 kg (184 lb 8 oz)              We Performed the Following     ELLIOT Scrn Rflx to Titer and Ptrn ([facility])     Complement C4     DNA double stranded antibodies     Rheumatoid factor        Primary Care Provider Office Phone # Fax #    Shon Quiroga [phone]       [facility] [address]  [facility] [address]        Equal Access to Services     [facility] : Hadii aad ku hadasho Soomaali, waaxda luqadaha, qaybta kaalmada adeegyada, waxay amy haysima rivera . So [facility] [phone].    ATENCIÓN: Si habla español, tiene a herman disposición servicios gratuitos de asistencia lingüística. Llame al [phone].    We comply with applicable federal civil rights laws and [place] laws. We do not discriminate on the basis of race, color, national origin, age, disability, sex, sexual orientation, or gender identity.            Thank you!     Thank you for choosing [facility]UR[facility]  for your care. Our goal is always to provide you with excellent care. Hearing back from our patients is one way we can continue to improve our services. Please take a few minutes to complete the written survey that you may receive in the mail after your visit with us. Thank you!             Your Updated Medication List - Protect others around you: Learn how to safely use, store and throw away your medicines at www.disposemymeds.org.          This list is accurate as of 8/3/18 " 11:59 PM.  Always use your most recent med list.                   Brand Name Dispense Instructions for use Diagnosis    aMILoride 5 MG tablet    MIDAMOR    60 tablet    Take 2 tablets (10 mg) by mouth daily        carvedilol 25 MG tablet    COREG    60 tablet    Take 1 tablet (25 mg) by mouth 2 times daily (with meals)    Hypertension, unspecified type       hydrochlorothiazide 12.5 MG capsule    MICROZIDE    60 capsule    Take 2 capsules (25 mg) by mouth daily        VITAMIN B-3 OR

## 2018-08-03 NOTE — ED AVS SNAPSHOT
[facility], Emergency Department    [address] [address]    Phone:  [phone]                                       Boy Aguirre   MRN: [id number]    Department:  [facility], Emergency Department   Date of Visit:  8/3/2018           Patient Information     Date Of Birth          1955        Your diagnoses for this visit were:     Essential hypertension     Hypertension, unspecified type        You were seen by Dami Herrera MD.      Discharge References/Attachments     HIGH BLOOD PRESSURE (HYPERTENSION), DISCHARGE INSTRUCTIONS (ENGLISH)      24 Hour Appointment Hotline       To make an appointment at any [facility] [place], call [phone] ([phone]). If you don't have a family doctor or clinic, we will help you find one. [place] clinics are conveniently located to serve the needs of you and your family.             Review of your medicines      Our records show that you are taking the medicines listed below. If these are incorrect, please call your family doctor or clinic.        Dose / Directions Last dose taken    aMILoride 5 MG tablet   Commonly known as:  MIDAMOR   Dose:  10 mg   Quantity:  60 tablet        Take 2 tablets (10 mg) by mouth daily   Refills:  0        carvedilol 25 MG tablet   Commonly known as:  COREG   Dose:  25 mg   Quantity:  60 tablet        Take 1 tablet (25 mg) by mouth 2 times daily (with meals)   Refills:  1        hydrochlorothiazide 12.5 MG capsule   Commonly known as:  MICROZIDE   Dose:  25 mg   Quantity:  60 capsule        Take 2 capsules (25 mg) by mouth daily   Refills:  0        VITAMIN B-3 OR        Refills:  0                Prescriptions were sent or printed at these locations (3 Prescriptions)                   Other Prescriptions                Printed at Department/Unit printer (3 of 3)         aMILoride (MIDAMOR) 5 MG tablet               carvedilol (COREG) 25 MG tablet               hydrochlorothiazide (MICROZIDE) 12.5 MG  "capsule                Orders Needing Specimen Collection     Ordered          08/03/18 1735  Basic metabolic panel - STAT, Prio: STAT, Needs to be Collected     Scheduled Task Status   08/03/18 1736 Collect Basic metabolic panel Open   Order Class:  PCU Collect                  Pending Results     Date and Time Order Name Status Description    8/3/2018 1359 Anti Nuclear Esperanza IgG by IFA with Reflex In process     8/3/2018 1340 SSB LA [place] ANTIBODY IGG In process     8/3/2018 1340 SSA RO [place] ANTIBODY IGG In process     8/3/2018 1340 VASCULITIS PANEL In process     8/3/2018 1340 COMPLEMENT C3 In process     8/3/2018 1249 RHEUMATOID FACTOR In process     8/3/2018 1249 COMPLEMENT C4 In process             Pending Culture Results     No orders found from 8/1/2018 to 8/4/2018.            Pending Results Instructions     If you had any lab results that were not finalized at the time of your Discharge, you can call the ED Lab Result RN at [phone]. You will be contacted by this team for any positive Lab results or changes in treatment. The nurses are available 7 days a week from 10A to 6:30P.  You can leave a message 24 hours per day and they will return your call.        Thank you for choosing [facility]       Thank you for choosing [facility] for your care. Our goal is always to provide you with excellent care. Hearing back from our patients is one way we can continue to improve our services. Please take a few minutes to complete the written survey that you may receive in the mail after you visit with us. Thank you!        [employer] Information     viseto lets you send messages to your doctor, view your test results, renew your prescriptions, schedule appointments and more. To sign up, go to [email or web address] . Click on \"Log in\" on the left side of the screen, which will take you to the Welcome page. Then click on \"Sign up Now\" on the right side of the page.     You will be asked to enter the access code listed below, " as well as some personal information. Please follow the directions to create your username and password.     Your access code is: [email or web address]  Expires: 10/18/2018  6:30 AM     Your access code will  in 90 days. If you need help or a new code, please call your [place] clinic or [phone].        Care EveryWhere ID     This is your Care EveryWhere ID. This could be used by other organizations to access your [place] medical records  [id number]        Equal Access to Services     GL[employer] BOWMAN : Rhys gilliam Sosamara, wamartín luashley, qabailey kaalmaivy hensley, karen rivera . So [facility] [phone].    ATENCIÓN: Si habla español, tiene a herman disposición servicios gratuitos de asistencia lingüística. Llame al [phone].    We comply with applicable federal civil rights laws and [place] laws. We do not discriminate on the basis of race, color, national origin, age, disability, sex, sexual orientation, or gender identity.            After Visit Summary       This is your record. Keep this with you and show to your community pharmacist(s) and doctor(s) at your next visit.

## 2018-08-03 NOTE — MR AVS SNAPSHOT
"              After Visit Summary   8/3/2018    Boy Aguirre    MRN: [id number]           Patient Information     Date Of Birth          1955        Visit Information        Provider Department      8/3/2018 1:00 PM Tech, [facility] C[facility] Monitor, [facility]        Today's Diagnoses     Cerebrovascular accident (CVA), unspecified mechanism (H)           Follow-ups after your visit        Who to contact     If you have questions or need follow up information about today's clinic visit or your schedule please contact [facility] directly at [phone].  Normal or non-critical lab and imaging results will be communicated to you by Vcommercehart, letter or phone within 4 business days after the clinic has received the results. If you do not hear from us within 7 days, please contact the clinic through D[employer]ere Bolot or phone. If you have a critical or abnormal lab result, we will notify you by phone as soon as possible.  Submit refill requests through [employer] or call your pharmacy and they will forward the refill request to us. Please allow 3 business days for your refill to be completed.          Additional Information About Your Visit        MyChart Information     [employer] lets you send messages to your doctor, view your test results, renew your prescriptions, schedule appointments and more. To sign up, go to [email or web address] . Click on \"Log in\" on the left side of the screen, which will take you to the Welcome page. Then click on \"Sign up Now\" on the right side of the page.     You will be asked to enter the access code listed below, as well as some personal information. Please follow the directions to create your username and password.     Your access code is: [email or web address]  Expires: 10/18/2018  6:30 AM     Your access code will  in 90 days. If you need help or a new code, please call your [place] clinic or [phone].        Care EveryWhere ID     This is your Care EveryWhere ID. This could be " used by other organizations to access your [place] medical records  [id number]         Blood Pressure from Last 3 Encounters:   08/03/18 (!) 174/134   08/03/18 (!) 207/144   02/27/18 (!) 229/129    Weight from Last 3 Encounters:   08/03/18 78.9 kg (173 lb 14.4 oz)   08/03/18 78.9 kg (174 lb)   02/27/18 83.7 kg (184 lb 8 oz)              We Performed the Following     Zio Patch Holter        Primary Care Provider Office Phone # Fax #    Shon Quiroga [phone]       [facility] [address]  [facility] [address]        Equal Access to Services     GLORY BOWMAN : Hadii saeed mathuro Sosamara, waaxda luqadaha, qaybta kaalmada adeegyada, karen cruz. So [facility] [phone].    ATENCIÓN: Si habla español, tiene a herman disposición servicios gratuitos de asistencia lingüística. [facility] [phone].    We comply with applicable federal civil rights laws and [place] laws. We do not discriminate on the basis of race, color, national origin, age, disability, sex, sexual orientation, or gender identity.            Thank you!     Thank you for choosing [facility]  for your care. Our goal is always to provide you with excellent care. Hearing back from our patients is one way we can continue to improve our services. Please take a few minutes to complete the written survey that you may receive in the mail after your visit with us. Thank you!             Your Updated Medication List - Protect others around you: Learn how to safely use, store and throw away your medicines at www.disposemymeds.org.          This list is accurate as of 8/3/18 11:59 PM.  Always use your most recent med list.                   Brand Name Dispense Instructions for use Diagnosis    aMILoride 5 MG tablet    MIDAMOR    60 tablet    Take 2 tablets (10 mg) by mouth daily        carvedilol 25 MG tablet    COREG    60 tablet    Take 1 tablet (25 mg) by mouth 2 times daily (with meals)     Hypertension, unspecified type       hydrochlorothiazide 12.5 MG capsule    MICROZIDE    60 capsule    Take 2 capsules (25 mg) by mouth daily        VITAMIN B-3 OR

## 2018-08-03 NOTE — ED AVS SNAPSHOT
[facility], [place], Emergency Department    [address] [address]    Phone:  [phone]                                       Boy Aguirre   MRN: [id number]    Department:  [facility], Emergency Department   Date of Visit:  8/3/2018           After Visit Summary Signature Page     I have received my discharge instructions, and my questions have been answered. I have discussed any challenges I see with this plan with the nurse or doctor.    ..........................................................................................................................................  Patient/Patient Representative Signature      ..........................................................................................................................................  Patient Representative Print Name and Relationship to Patient    ..................................................               ................................................  Date                                            Time    ..........................................................................................................................................  Reviewed by Signature/Title    ...................................................              ..............................................  Date                                                            Time

## 2018-08-03 NOTE — ED PROVIDER NOTES
History     Chief Complaint   Patient presents with     Hypertension     HPI  Boy Aguirre is a 63 year old male with a history of hypertension.  Patient was sent from neurology clinic.  Currently, the patient says that he wants to get his medications and leave.  Patient denies fevers, chills, chest pain, shortness of breath, headache, neck pain, abdominal pain, back pain.  Patient says he feels fine.  Walking fine.  No numbness or tingling.  States he does not want any more treatment today.  Patient is going back to [place] to get to work.  He did not bring his medications here.      No current facility-administered medications for this encounter.      Current Outpatient Prescriptions   Medication     carvedilol (COREG) 25 MG tablet     aMILoride (MIDAMOR) 5 MG tablet     hydrochlorothiazide (MICROZIDE) 12.5 MG capsule     Niacin (VITAMIN B-3 OR)     Past Medical History:   Diagnosis Date     Male-to-female transgender person        Past Surgical History:   Procedure Laterality Date     OPTICAL TRACKING SYSTEM BIOPSY BRAIN Right 11/19/2017    Procedure: OPTICAL TRACKING SYSTEM BIOPSY BRAIN;  Stealth Guided Right Frontal Craniotomy for Biopsy;  Surgeon: Alexandr Hauser MD;  Location: [facility] OR     TONSILLECTOMY      As a child       Family History   Problem Relation Age of Onset     Hypertension Mother      Alzheimer Disease Mother      Diabetes Other      Cancer No family hx of        Social History   Substance Use Topics     Smoking status: Former Smoker     Packs/day: 1.00     Years: 5.00     Types: Cigarettes     Smokeless tobacco: Former User     Quit date: 11/13/1976      Comment: Smoked from age [age], but not since     Alcohol use Yes      Comment: Very rare beer     Allergies   Allergen Reactions     Insulin Swelling     Face and throat swelling         I have reviewed the Medications, Allergies, Past Medical and Surgical History, and Social History in the Epic system.    Review of  "Systems    Physical Exam   BP:  (monitor unable to read bp initially)  Heart Rate: 92  Temp: 98.6  F (37  C)  Height: 177.8 cm (5' 10\")  Weight: 78.9 kg (173 lb 14.4 oz)  SpO2: 99 %      Physical Exam  Physical Exam   Constitutional: oriented to person, place, and time. appears well-developed and well-nourished.   HENT:   Head: Normocephalic and atraumatic.   Neck: Normal range of motion.   Pulmonary/Chest: Effort normal. No respiratory distress.   Abdominal: Abdomen soft, nontender, nondistended. No rebound tenderness.  MSK: Long bones without deformity or evidence of trauma  Neurological: alert and oriented to person, place, and time.   Neck is supple.  Cranial nerves II through XII intact.  Gait is intact.  Strength and sensation equal in upper and lower extremities.  Skin: Skin is warm and dry.   Psychiatric:  normal mood and affect.  behavior is normal. Thought content normal.     ED Course     ED Course     Procedures        Labs Ordered and Resulted from Time of ED Arrival Up to the Time of Departure from the ED - No data to display         Assessments & Plan (with Medical Decision Making)   Differential includes hypertension, hypertensive emergency, stroke, TIA, kidney dysfunction      MDM and Plan  Patient is presenting with hypertension from clinic.  On interview, the patient says he feels fine.  Denies neurologic, cardiorespiratory, abdominal or other symptoms.  Patient overall does look well.  Patient does not want labs or any further workup.  Patient just wants a refill.  We discussed dangers of very high hypertension, the patient understands risks of intracranial hemorrhage, stroke, kidney failure, cardiac disease.  He says he will return with further symptoms.  Patient was refill his medications and will follow-up at home.    I have reviewed the nursing notes.    I have reviewed the findings, diagnosis, plan and need for follow up with the patient.    New Prescriptions    No medications on file "       Final diagnoses:   Essential hypertension     I, Alo Walter, am serving as a trained medical scribe to document services personally performed by Dami Herrera MD, based on the provider's statements to me.      Dami SANDERS MD, was physically present and have reviewed and verified the accuracy of this note documented by Alo Walter.    8/3/2018   [facility], [place], EMERGENCY DEPARTMENT     Dami Herrera MD  08/03/18 1746       Dami Herrera MD  08/03/18 1187

## 2018-08-03 NOTE — Clinical Note
8/3/2018       RE: Boy Aguirre  [address]  [facility] [address]     Dear Colleague,    Thank you for referring your patient, Boy Aguirre, to the [facility] NEUROLOGY at [facility] Please see a copy of my visit note below.    [facility]  Stroke Clinic  8/3/2018      Boy Aguirre MRN# [id number]   YOB: 1955 Age: 63 year old       Primary care provider: Mckay Hill    Reason for Clinic Visit:RECHECK ([facility] RETURN - F/U)           Assessment and Recommendations:    Boy Aguirre is a 63 yo m  With pmhx of m to f transgender  CKD, poorly controlled HTN, ?DMII, Rt frontal aggressive cerebral amyloid angiopathy with vasogenic edema (Bx confirmed amyloid but without inflammatory component) Nov 2017, multiple subcortical signal abn on MRI likely related to the inflammatory cerebral amyloid angiopathy vs subcortical strokes. He was managed with blood pressure control and avoiding antiplatelet use. Had a follow-up MRI April 2018 which showed resolution of the Rt frontal edema, significant subcortical periventricular ischemic changes, there are areas of probably new subacute stroke again occurring in the periventricular subcortical locations including left splenium. He have been clinically asymptomatic with normal neurological exam. These subcortical findings may represent a normal phenomena seen sometimes with ICH, but other differential included, small vessel disease(lacunes), vasculitis (unlikely due to being asymptomatic and normal inflammatory markers)  Or embolic stroke.   His compliance with his medications is questionable. During the clinic today his blood pressure was extremely elevated 210/144, so I asked that he be seen in the ED for this.    Dx:  - Uncontrolled HTN  - Bi-hemispheric subcortical periventricular white matter signal abnormality differential include: normal phenomena associated with initial ICH, or   ischemic stroke likely secondary to small vessel disease due poorly controled risk factors, or CNS vasculitis (unlikely becasue patient is asymptomatic and no signs of inflammation ) or embolic.   - Multiple Rt frontal focal microhemorrhages. Dx: cerebral amyloid angiopathy confirmed by biopsy.    Plan:  - Referred to the ED for elevated Bp  - Bp long term target < 130/80  - Take blood pressure daily and write it down  - Patient will need close blood pressure followup by his PCP  - Continue to avoid ASA due to dx of cerebral amyloid angiopathy. Avoiding ASA is a relative contraindication due to risk of bleeding. However in future followups I will consider starting patient on low dose of ASA 81mg or cilostazole 100mg bid (CSPS 2 trial 2010, noninferior than ASA in Asians and safer hemorrhagic risk than ASA) if he new strokes.  - MRA (head/neck) to revaluate the cerebral vessels (vasculitis in the differential however is probably unlikely), MRI brain   - zio patch (embolic stroke is in the differential, however is probably unlikely too)  - Inflammatory and autoimmune screen  - Lipid panel, A1C  - Will followup in 6 months and as needed.     Kiera Art MD  Stroke Neurology         History of Present Illness:     History is obtained from the patient and/or family and medical record.    Boy Aguirre is a 63 year old male with a history of m--> F transgender, off hormones for several year, poorly controlled HTN due to poor compliance, mild DMII, Rt frontal aggressive cerebral amyloid angiopathy with vasogenic edema (Bx confirmed amyloid but without inflammatory component), multiple subcortical signal abn on MRI likely related to the inflammatory cerebral amyloid angiopathy vs subcortical strokes. He was managed with blood pressure control and avoiding antiplatelet use. Had a follow-up MRI April 2018 which showed resolution of the Rt frontal edema, significant subcortical periventricular ischemic changes, there  was areas of probably new subacute stroke again occurring in the periventricular subcortical locations including left splenium.    From I last saw the patient in the month of February he denies any neurological symptoms. He was doing well. 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 Able to perform all his activities independently. Drives a care. He admits taking his medications regularly and keeps his PCP appointment. He admits that his blood pressure is almost always < 120/80 at home. He explains his elevated blood pressure today of 207/144 that he drove yesterday 6 hour to reach here and didn't take his morning coreg.     He denies headache, weakness, sensory symptoms, gait issues, visual problems, no dizziness, no dysphagia and no difficulty with language. He is planning to drive back home today which is more than 6 hours away.     He denies depression. No sleep apnea symptoms.            Past Medical History:     Past Medical History:   Diagnosis Date     Male-to-female transgender person               Social History:     Social History     Social History     Marital status: Single     Spouse name: N/A     Number of children: N/A     Years of education: N/A     Occupational History     Not on file.     Social History Main Topics     Smoking status: Former Smoker     Packs/day: 1.00     Years: 5.00     Types: Cigarettes     Smokeless tobacco: Former User     Quit date: 11/13/1976      Comment: Smoked from age [age], but not since     Alcohol use Yes      Comment: Very rare beer     Drug use: No     Sexual activity: Not Currently     Partners: Female     Other Topics Concern     Parent/Sibling W/ Cabg, Mi Or Angioplasty Before 65f 55m? 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  Remote former smoking history.  Very rare etoh.             Family History:     Family History   Problem Relation Age of Onset     Hypertension Mother      Alzheimer Disease Mother   "    Diabetes Other      Cancer No family hx of             Allergies:      Allergies   Allergen Reactions     Insulin Swelling     Face and throat swelling             Medications:     Current Outpatient Prescriptions:      carvedilol (COREG) 25 MG tablet, Take 1 tablet (25 mg) by mouth 2 times daily (with meals), Disp: 60 tablet, Rfl: 1     Niacin (VITAMIN B-3 OR), , Disp: , Rfl:      aMILoride (MIDAMOR) 5 MG tablet, Take 2 tablets (10 mg) by mouth daily, Disp: 60 tablet, Rfl: 0     hydrochlorothiazide (MICROZIDE) 12.5 MG capsule, Take 2 capsules (25 mg) by mouth daily, Disp: 60 capsule, Rfl: 0    (Not in a hospital admission)              Physical Exam:   BP (!) 207/144 (BP Location: Right arm, Patient Position: Sitting, Cuff Size: Adult Regular)  Pulse 98  Resp 20  Ht 1.765 m (5' 9.5\")  Wt 78.9 kg (174 lb)  SpO2 98%  BMI 25.33 kg/m2   Bp end of clinic 210/130    Physical Exam:   General: NAD  HEENT: sclera anicteric  Resp: Breathing comfortably, no respiratory distress  CVS: RRR  Skin: warm and dry  Extremities: No edema    Neurologic Exam   Score    Level of consciousness: (0)   Alert, keenly responsive    LOC questions: (0)   Answers both questions correctly    LOC commands: (0)   Performs both tasks correctly    Best gaze: (0)   Normal    Visual: (0)   No visual loss    Facial palsy: (0)   Normal symmetrical movements    Motor arm (left): (0)   No drift    Motor arm (right): (0)   No drift    Motor leg (left): (0)   No drift    Motor leg (right): (0)   No drift    Limb ataxia: (0)   Absent    Sensory: (0)   Normal- no sensory loss    Best language: (0)   Normal- no aphasia    Dysarthria: (0)   Normal    Extinction and inattention: (0)   No abnormality        NIHSS Total Score:  0         Mental status: alert, interactive and alert and oriented x 3  Cranial nerves: intact  Motor: No abnormal posture, tone, atrophy, or movements/fasciculations.    Biceps Triceps Deltoid Hip flexor Knee extension Knee " flexion Ankle extension Ankle flexion   Right 5 5 5 5 5 5 5 5 5   Left 5 5 5 5 5 5 5 5 5   Sensory: Intact to light touch in all extremities.  Coordination: Intact FNF and heel-shin. No Dysmetria. No Dysdiadochokinesia.  Gait: Normal width, stride length, turn, and arm swing.    Labs/Studies:  Recent Labs   Lab Test  02/27/18   1327  11/20/17   0342  11/18/17   1759   11/12/17   0740   NA  141  139  137   < >  138   POTASSIUM  4.2  4.1  4.5   < >  4.4   CHLORIDE  106  106  104   < >  112*   CO2  27  24  26   < >  13*   ANIONGAP  8  9  7   < >  13   GLC  136*  231*  189*   < >  217*   BUN  23  30  32*   < >  73*   CR  2.23*  2.10*  2.10*   < >  2.54*   ALICIA  8.8  8.0*  8.3*   < >  8.1*   WBC   --   20.3*  14.4*   --   15.3*   RBC   --   3.61*  3.85*   --   3.96*   HGB   --   10.8*  11.6*   --   11.8*   PLT   --   217  175   --   225    < > = values in this interval not displayed.       Recent Labs   Lab Test  11/18/17   1759  11/11/17   1313   INR  1.00  1.03   PTT  29  23       Hemoglobin A1C   Date Value Ref Range Status   11/12/2017 6.7 (H) 4.3 - 6.0 % Final       Imaging:  Reviewed    Answers for HPI/ROS submitted by the patient on 8/3/2018   General Symptoms: No  Skin Symptoms: No  HENT Symptoms: No  EYE SYMPTOMS: No  HEART SYMPTOMS: No  LUNG SYMPTOMS: No  INTESTINAL SYMPTOMS: No  URINARY SYMPTOMS: No  REPRODUCTIVE SYMPTOMS: No  SKELETAL SYMPTOMS: No  BLOOD SYMPTOMS: No  NERVOUS SYSTEM SYMPTOMS: No  MENTAL HEALTH SYMPTOMS: No  PHQ-2 Score: 0      Again, thank you for allowing me to participate in the care of your patient.      Sincerely,    Kiera Art MD

## 2018-08-03 NOTE — PROGRESS NOTES
Addendum note:  Patient had zio patch for 14 days without evidence of afib  MRI brain: No acute stroke. subcortical b/l old strokes unchanged. Rt frontal micro-hemorrhages unchanged.  MRA head: unremarkable  MRA neck: unremarkable, Left Vert end at PICA.  Anti-inflammatory and autoimmune unremarkable. ESR 28 (normal for age)   A1C 6.6      Impression + Plan:  - Focal Rt frontal severe cerebral amyloid angiopathy (biopsy confirmed) s/p bleeding/edema 11/2017 + B/l associated subcortical strokes. F/U MRI April 2018 showed subacute subcortical strokes. These strokes are associated with flair signal too, no ADC associated and if you look at MRI of 11/2017 they were actually found there. I think they all occurred during or at the time of his bleed/edema 11/2017. ICH can be associated with subcortical DWI and this association has unkown pathophysiology.    Plan:  - Blood pressure control < 130/80 to prevent bleeding risk from his CAA  - Keep a Bp diary, f/u with PCP on regular basis  - A1C  Target < 7. (Patient is at target)  - LDL target <100. (avoid levels below 50) I would suggest starting a small dose of lipitor 10mg po daily  - Continue to avoid ASA due to dx of cerebral amyloid angiopathy. Avoiding ASA is a relative contraindication due to risk of bleeding. However in future if patient develops ischemic stroke it would be possible to start patient on low dose of ASA 81mg or cilostazole 100mg bid (CSPS 2 trial 2010, noninferior than ASA in Asians and safer hemorrhagic risk than ASA). Risks of strokes and bleeding could be discussed then.  F/u at stroke clinic late winter/early spring.    Kiera Art MD  Vascular Neurology    ------------------------  [facility]  Stroke Clinic  8/3/2018      Boy Aguirre MRN# [id number]   YOB: 1955 Age: 63 year old       Primary care provider: [facility]    Reason for Clinic Visit:RECHECK (P RETURN - F/U)            Assessment and Recommendations:    Boy Aguirre is a 63 yo m  With pmhx of m to f transgender  CKD, poorly controlled HTN, ?DMII, Rt frontal aggressive cerebral amyloid angiopathy with vasogenic edema (Bx confirmed amyloid but without inflammatory component) Nov 2017, multiple subcortical signal abn on MRI likely related to the inflammatory cerebral amyloid angiopathy vs subcortical strokes. He was managed with blood pressure control and avoiding antiplatelet use. Had a follow-up MRI April 2018 which showed resolution of the Rt frontal edema, significant subcortical periventricular ischemic changes, there are areas of probably new subacute stroke again occurring in the periventricular subcortical locations including left splenium. He have been clinically asymptomatic with normal neurological exam. These subcortical findings may represent a normal phenomena seen sometimes with ICH, but other differential included, small vessel disease(lacunes), vasculitis (unlikely due to being asymptomatic and normal inflammatory markers)  Or embolic stroke.   His compliance with his medications is questionable. During the clinic today his blood pressure was extremely elevated 210/144, so I asked that he be seen in the ED for this.    Dx:  - Uncontrolled HTN  - Bi-hemispheric subcortical periventricular white matter signal abnormality differential include: normal phenomena associated with initial ICH, or  ischemic stroke likely secondary to small vessel disease due poorly controled risk factors, or CNS vasculitis (unlikely becasue patient is asymptomatic and no signs of inflammation ) or embolic.   - Multiple Rt frontal focal microhemorrhages. Dx: cerebral amyloid angiopathy confirmed by biopsy.    Plan:  - Referred to the ED for elevated Bp  - Bp long term target < 130/80  - Take blood pressure daily and write it down  - Patient will need close blood pressure followup by his PCP  - Continue to avoid ASA due to dx of cerebral  amyloid angiopathy. Avoiding ASA is a relative contraindication due to risk of bleeding. However in future followups I will consider starting patient on low dose of ASA 81mg or cilostazole 100mg bid (CSPS 2 trial 2010, noninferior than ASA in Asians and safer hemorrhagic risk than ASA) if he new strokes.  - MRA (head/neck) to revaluate the cerebral vessels (vasculitis in the differential however is probably unlikely), MRI brain   - zio patch (embolic stroke is in the differential, however is probably unlikely too)  - Inflammatory and autoimmune screen  - Lipid panel, A1C  - Will followup in 6 months and as needed.     Kiera Art MD  Stroke Neurology         History of Present Illness:     History is obtained from the patient and/or family and medical record.    Boy Aguirre is a 63 year old male with a history of m--> F transgender, off hormones for several year, poorly controlled HTN due to poor compliance, mild DMII, Rt frontal aggressive cerebral amyloid angiopathy with vasogenic edema (Bx confirmed amyloid but without inflammatory component), multiple subcortical signal abn on MRI likely related to the inflammatory cerebral amyloid angiopathy vs subcortical strokes. He was managed with blood pressure control and avoiding antiplatelet use. Had a follow-up MRI April 2018 which showed resolution of the Rt frontal edema, significant subcortical periventricular ischemic changes, there was areas of probably new subacute stroke again occurring in the periventricular subcortical locations including left splenium.    From I last saw the patient in the month of February he denies any neurological symptoms. He was doing well. 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 Able to perform all his activities independently. Drives a care. He admits taking his medications regularly and keeps his PCP appointment. He admits that his blood pressure is almost always < 120/80 at home. He explains his elevated blood pressure today of  207/144 that he drove yesterday 6 hour to reach here and didn't take his morning coreg.     He denies headache, weakness, sensory symptoms, gait issues, visual problems, no dizziness, no dysphagia and no difficulty with language. He is planning to drive back home today which is more than 6 hours away.     He denies depression. No sleep apnea symptoms.            Past Medical History:     Past Medical History:   Diagnosis Date     Male-to-female transgender person               Social History:     Social History     Social History     Marital status: Single     Spouse name: N/A     Number of children: N/A     Years of education: N/A     Occupational History     Not on file.     Social History Main Topics     Smoking status: Former Smoker     Packs/day: 1.00     Years: 5.00     Types: Cigarettes     Smokeless tobacco: Former User     Quit date: 11/13/1976      Comment: Smoked from age [age], but not since     Alcohol use Yes      Comment: Very rare beer     Drug use: No     Sexual activity: Not Currently     Partners: Female     Other Topics Concern     Parent/Sibling W/ Cabg, Mi Or Angioplasty Before 65f 55m? Yes     Social History Narrative    [employer] , served 1977 - 1979 then in [employer] from 1979 - 1989.  Remote former smoking history.  Very rare etoh.             Family History:     Family History   Problem Relation Age of Onset     Hypertension Mother      Alzheimer Disease Mother      Diabetes Other      Cancer No family hx of             Allergies:      Allergies   Allergen Reactions     Insulin Swelling     Face and throat swelling             Medications:     Current Outpatient Prescriptions:      carvedilol (COREG) 25 MG tablet, Take 1 tablet (25 mg) by mouth 2 times daily (with meals), Disp: 60 tablet, Rfl: 1     Niacin (VITAMIN B-3 OR), , Disp: , Rfl:      aMILoride (MIDAMOR) 5 MG tablet, Take 2 tablets (10 mg) by mouth daily, Disp: 60 tablet, Rfl: 0     hydrochlorothiazide (MICROZIDE) 12.5 MG  "capsule, Take 2 capsules (25 mg) by mouth daily, Disp: 60 capsule, Rfl: 0    (Not in a hospital admission)              Physical Exam:   BP (!) 207/144 (BP Location: Right arm, Patient Position: Sitting, Cuff Size: Adult Regular)  Pulse 98  Resp 20  Ht 1.765 m (5' 9.5\")  Wt 78.9 kg (174 lb)  SpO2 98%  BMI 25.33 kg/m2   Bp end of clinic 210/130    Physical Exam:   General: NAD  HEENT: sclera anicteric  Resp: Breathing comfortably, no respiratory distress  CVS: RRR  Skin: warm and dry  Extremities: No edema    Neurologic Exam   Score    Level of consciousness: (0)   Alert, keenly responsive    LOC questions: (0)   Answers both questions correctly    LOC commands: (0)   Performs both tasks correctly    Best gaze: (0)   Normal    Visual: (0)   No visual loss    Facial palsy: (0)   Normal symmetrical movements    Motor arm (left): (0)   No drift    Motor arm (right): (0)   No drift    Motor leg (left): (0)   No drift    Motor leg (right): (0)   No drift    Limb ataxia: (0)   Absent    Sensory: (0)   Normal- no sensory loss    Best language: (0)   Normal- no aphasia    Dysarthria: (0)   Normal    Extinction and inattention: (0)   No abnormality        NIHSS Total Score:  0         Mental status: alert, interactive and alert and oriented x 3  Cranial nerves: intact  Motor: No abnormal posture, tone, atrophy, or movements/fasciculations.    Biceps Triceps Deltoid Hip flexor Knee extension Knee flexion Ankle extension Ankle flexion   Right 5 5 5 5 5 5 5 5 5   Left 5 5 5 5 5 5 5 5 5   Sensory: Intact to light touch in all extremities.  Coordination: Intact FNF and heel-shin. No Dysmetria. No Dysdiadochokinesia.  Gait: Normal width, stride length, turn, and arm swing.    Labs/Studies:  Recent Labs   Lab Test  02/27/18   1327  11/20/17   0342  11/18/17   1759   11/12/17   0740   NA  141  139  137   < >  138   POTASSIUM  4.2  4.1  4.5   < >  4.4   CHLORIDE  106  106  104   < >  112*   CO2  27  24  26   < >  13* "   ANIONGAP  8  9  7   < >  13   GLC  136*  231*  189*   < >  217*   BUN  23  30  32*   < >  73*   CR  2.23*  2.10*  2.10*   < >  2.54*   ALICIA  8.8  8.0*  8.3*   < >  8.1*   WBC   --   20.3*  14.4*   --   15.3*   RBC   --   3.61*  3.85*   --   3.96*   HGB   --   10.8*  11.6*   --   11.8*   PLT   --   217  175   --   225    < > = values in this interval not displayed.       Recent Labs   Lab Test  11/18/17   1759  11/11/17   1313   INR  1.00  1.03   PTT  29  23       Hemoglobin A1C   Date Value Ref Range Status   11/12/2017 6.7 (H) 4.3 - 6.0 % Final       Imaging:  Reviewed    Answers for HPI/ROS submitted by the patient on 8/3/2018   General Symptoms: No  Skin Symptoms: No  HENT Symptoms: No  EYE SYMPTOMS: No  HEART SYMPTOMS: No  LUNG SYMPTOMS: No  INTESTINAL SYMPTOMS: No  URINARY SYMPTOMS: No  REPRODUCTIVE SYMPTOMS: No  SKELETAL SYMPTOMS: No  BLOOD SYMPTOMS: No  NERVOUS SYSTEM SYMPTOMS: No  MENTAL HEALTH SYMPTOMS: No  PHQ-2 Score: 0

## 2018-08-03 NOTE — PATIENT INSTRUCTIONS
- Continue to avoid ASA for now due to risk of bleeding with cerebral amyloid angiopathy confirmed by biopsy  - Refer to the ED for extremely high blood pressure and to help in Blood pressure control before he leaves to [facility].  - Blood pressure long terget < 130/80  - HBA1C target < 7  - F/u MRI brain with MRA (recurrent stroke on prior MRI)  - Inflammatory markers, autoimmune markers  - avoid estrogen  - zio patch

## 2018-08-03 NOTE — ED NOTES
Bed: IN04  Expected date: 8/3/18  Expected time: 1:33 PM  Means of arrival:   Comments:  Boy Aguirre  /140 at neuro clinic  Was at clinic for stroke f/u

## 2018-08-03 NOTE — TELEPHONE ENCOUNTER
[facility]. Patient will need follow-up imaging that he would like to do locally.     [facility] who states [facility] needs a secondary auth for imaging. Ivanna Sheppard RN 8/3/2018 2:53 PM     Per Edilia - fax order and reason/rationale for imaging to her attention at [phone]. Advised Edilia that RN will fax order and note, when note becomes available. Ivanna Sheppard RN 8/3/2018 3:26 PM     Order and note faxed to Edilia.   Ivanna Sheppard RN   8/8/2018 11:41 AM     [facility] for Edilia to return call to ensure orders were received.  Ivanna Sheppard RN   8/13/2018 1:41 PM     [facility] stating [facility]. Patient will need to schedule with Akil. They have left him a message to call and schedule.   Ivanna Sheppard RN   8/13/2018 3:18 PM

## 2018-08-06 LAB
ANA SER QL IF: NEGATIVE
C3 SERPL-MCNC: 136 MG/DL (ref 76–169)
C4 SERPL-MCNC: 25 MG/DL (ref 15–50)
DSDNA AB SER-ACNC: 1 IU/ML
ENA SS-A IGG SER IA-ACNC: <0.2 AI (ref 0–0.9)
ENA SS-B IGG SER IA-ACNC: <0.2 AI (ref 0–0.9)
MYELOPEROXIDASE AB SER-ACNC: <0.2 AI (ref 0–0.9)
PROTEINASE3 IGG SER-ACNC: <0.2 AI (ref 0–0.9)
RHEUMATOID FACT SER NEPH-ACNC: <20 IU/ML (ref 0–20)

## 2018-08-06 NOTE — NURSING NOTE
Per Dr. Art, patient to have Ziopatch 14 day monitor placed.  Diagnosis: Cerebrovasular accident   Monitor placed: Yes  Patient Instructed: Yes  Patient verbalized understanding: Yes  Holter # [id number]    Placed By Yvette SOLIMAN

## 2018-08-22 ENCOUNTER — TRANSFERRED RECORDS (OUTPATIENT)
Dept: HEALTH INFORMATION MANAGEMENT | Facility: CLINIC | Age: 63
End: 2018-08-22

## 2018-09-06 NOTE — TELEPHONE ENCOUNTER
LVM vivek Love at number below asking for an update on patients imaging and if they have been completed. Asked to have mailed if they are done.     Wayne Mcgarry MA

## 2018-09-06 NOTE — TELEPHONE ENCOUNTER
[facility] Call Center    Phone Message    May a detailed message be left on voicemail: yes    Reason for Call: Other: Per call from Ivan PT had his imaging done at [facility].  Per Ivan she sent an email to request imaging mail it to provided address.       Action Taken: Message routed to:  [facility] Neurology

## 2018-10-01 ENCOUNTER — CARE COORDINATION (OUTPATIENT)
Dept: NEUROLOGY | Facility: CLINIC | Age: 63
End: 2018-10-01

## 2018-10-01 NOTE — PROGRESS NOTES
LVM for [facility] to send Imaging for this patient from 8/22/2018 be mailed to  [facility]. Should be sending MRI and MRA.     Wayne Mcgarry MA

## 2018-10-09 NOTE — TELEPHONE ENCOUNTER
Disc received from [place]. MRA neck, MRA head, MRI head completed 8/22/18. Sent to film room. Dr. Art informed.

## 2018-11-03 ENCOUNTER — TELEPHONE (OUTPATIENT)
Dept: CARDIOLOGY | Facility: CLINIC | Age: 63
End: 2018-11-03

## 2018-12-07 RX ORDER — ATORVASTATIN CALCIUM 10 MG/1
10 TABLET, FILM COATED ORAL DAILY
Qty: 30 TABLET | Refills: 11 | Status: SHIPPED | OUTPATIENT
Start: 2018-12-07 | End: 2019-04-02

## 2019-03-25 ENCOUNTER — TELEPHONE (OUTPATIENT)
Dept: NEUROLOGY | Facility: CLINIC | Age: 64
End: 2019-03-25

## 2019-03-25 NOTE — TELEPHONE ENCOUNTER
[facility].  I filled these out to the best of my ability and faxed them back to [facility] as requested.  The faxed forms are in our file drawer under faxes.

## 2019-03-25 NOTE — TELEPHONE ENCOUNTER
[facility] Call Center    Phone Message    May a detailed message be left on voicemail: yes    Reason for Call: Other: Juliana calling to speak with Renetta. She states that pt gave her a number to reach Renetta at and she says that the number is not accepting calls at this time. Juliana is calling about paperwork that she needs completed and sent back and would like to speak with Renetta about that. Please give Juliana a call back to advise.      Action Taken: Message routed to:  [facility] Neurology

## 2019-04-02 ENCOUNTER — OFFICE VISIT (OUTPATIENT)
Dept: NEUROLOGY | Facility: CLINIC | Age: 64
End: 2019-04-02
Payer: COMMERCIAL

## 2019-04-02 VITALS — OXYGEN SATURATION: 98 % | HEART RATE: 68 BPM | DIASTOLIC BLOOD PRESSURE: 88 MMHG | SYSTOLIC BLOOD PRESSURE: 188 MMHG

## 2019-04-02 DIAGNOSIS — I68.0 CEREBRAL AMYLOID ANGIOPATHY (CODE): Primary | ICD-10-CM

## 2019-04-02 ASSESSMENT — PAIN SCALES - GENERAL: PAINLEVEL: NO PAIN (0)

## 2019-04-02 NOTE — NURSING NOTE
Chief Complaint   Patient presents with     RECHECK     [facility] RETURN STROKE       Barbara Garcia, EMT

## 2019-04-02 NOTE — PROGRESS NOTES
[facility]    Vascular Neurology Consult    Name: Boy Aguirre  YOB: 1955  MRN: [id number]  Today's date: 4/2/2019  Source of information: Patient and chart review    Chief Complaint:  Neurology follow up    History of Present Illness:   Boy Aguirre is a 63 year old RH male from [place], [place] with a pmhx of M to F transgender - currently not on any hormonal therapy, asthma as a child, history of CKD, right sided sciatica, poorly controlled HTN, borderline diabetic, Rt frontal aggressive cerebral amyloid angiopathy with vasogenic edema  in Nov 2017, multiple subcortical signal abn on MRI likely related to the cerebral amyloid angiopathy vs subcortical strokes presents to the Stroke Clinic for follow up. (Bx confirmed amyloid but without inflammatory component)    After diagnosis in November 2017, he was managed with blood pressure control and avoiding antiplatelet use. Had a follow-up MRI April 2018 which showed resolution of the Rt frontal edema, significant subcortical periventricular ischemic changes, there are areas of probably ew subacute stroke again occurring in the periventricular subcortical locations including left splenium. He have been clinically asymptomatic with normal neurological exam. Currently remains asymptomatic but his BP during this clinic visit was SBP> 200s and on a recheck was 160s.     The patient's medical, surgical, social, and family history were personally reviewed with the patient.  Past Medical History:   Diagnosis Date     Male-to-female transgender person    Other medical history as mentioned above    Past Surgical History:   Procedure Laterality Date     OPTICAL TRACKING SYSTEM BIOPSY BRAIN Right 11/19/2017    Procedure: OPTICAL TRACKING SYSTEM BIOPSY BRAIN;  Stealth Guided Right Frontal Craniotomy for Biopsy;  Surgeon: Alexandr Hauser MD;  Location: [facility]     TONSILLECTOMY      As a child     Social  History     Tobacco Use     Smoking status: Former Smoker     Packs/day: 1.00     Years: 5.00     Pack years: 5.00     Types: Cigarettes     Smokeless tobacco: Former User     Quit date: 11/13/1976     Tobacco comment: Smoked from age [age], but not since   Substance Use Topics     Alcohol use: Yes     Comment: Very rare beer     Drug use: Yes     Comment: CBD oil daily     Family History   Problem Relation Age of Onset     Hypertension Mother      Alzheimer Disease Mother      Diabetes Other      Cancer No family hx of      Current Outpatient Medications   Medication     aMILoride (MIDAMOR) 5 MG tablet     atorvastatin (LIPITOR) 10 MG tablet     atorvastatin (LIPITOR) 10 MG tablet     carvedilol (COREG) 25 MG tablet     hydrochlorothiazide (MICROZIDE) 12.5 MG capsule     Niacin (VITAMIN B-3 OR)     No current facility-administered medications for this visit.      Allergies   Allergen Reactions     Insulin Swelling     Face and throat swelling     Review of Systems:  14-point review of systems was completed. The pertinent positives and negatives are in the HPI, and the remainder was negative unless otherwise mentioned.     Physical Exam:  /88 (BP Location: Right arm, Patient Position: Chair, Cuff Size: Adult Regular)   Pulse 68   SpO2 98%    General: Pleasant. Dressed appropriately for season.   Head: Normocephalic, atraumatic  Eyes: No conjunctival injection, no scleral icterus.   Mouth: No oral lesions, no erythema or exudate in the oropharynx  Respiratory: Non-labored breathing on room air. No accessory muscle use.  Cardiovascular: Peripheral pulses intact. No carotid bruits.  Abdomen: Soft  Extremities: Warm, dry without peripheral edema  Neurologic:    Mental Status Exam: Alert, awake. Fully oriented. Provides a thorough history. Speech of normal fluency.  Cranial Nerves:  EOMs intact, no nystagmus, facial movements symmetric, facial sensation intact to light touch, hearing intact to conversation,  palate and uvula rise symmetrically, no deviation in uvula or tongue, symmetric shoulder shrug, tongue midline and fully mobile.   Motor:  Normal tone in all four extremities, no atrophy or fasciculations were seen. 5/5 strength bilaterally in shoulder abduction, elbow extensors and flexors, wrist extension, finger abduction, hip flexors, knee extensors and flexors. No tremors.  Sensory: Sensation intact to light touch on arms and legs bilaterally. Negative Romberg.  Coordination: Finger-nose-finger without dysmetria bilaterally. Finger tapping regular and rhythmic bilaterally.   Reflexes: 2+ and symmetric in biceps, brachioradialis, patellar, Achilles, and plantars downgoing bilaterally.   Gait: Normal gait; normal arm swing and stance. Normal tandem.    Laboratory:  Lab Results   Component Value Date    WBC 10.8 08/03/2018    HGB 15.2 08/03/2018    HCT 45.3 08/03/2018     08/03/2018     08/03/2018    POTASSIUM 3.9 08/03/2018    CHLORIDE 102 08/03/2018    CO2 28 08/03/2018    BUN 16 08/03/2018    CR 1.98 (H) 08/03/2018     (H) 08/03/2018    SED 28 (H) 08/03/2018    NTBNPI 5,950 (H) 11/13/2017    AST 14 08/03/2018    ALT 16 08/03/2018    ALKPHOS 121 08/03/2018    BILITOTAL 0.7 08/03/2018    INR 1.00 11/18/2017   Reviewed imaging from prior hospitalizations.     Assessment/Recommendations:  Boy Aguirre is a 63 year old male with focal Rt frontal severe cerebral amyloid angiopathy (biopsy confirmed) s/p bleeding/edema 11/2017 + B/l associated subcortical strokes. F/U MRI April 2018 showed subacute subcortical strokes.      #Cerebral amyloid  - Avoid antiplatelets for now   - Consider long term rhythm monitoring to evaluate for occult atrial fibrillation. Given amyloid findings, patient might not be a candidate for anticoagulation but can consider other treatment options including ablation/ NALINI closure   - However in future if patient develops ischemic stroke it would be possible to start patient  on low dose of ASA 81mg or cilostazole 100mg bid (CSPS 2 trial 2010, noninferior than ASA in Asians and safer hemorrhagic risk than ASA). Risks of strokes and bleeding could be discussed then.    #Blood pressure  - Counseled regarding strict blood pressure control  - Long term BP goal < 130  -     #Follow up  - PCP in 1-2 weeks, consider referral to Cardiology for long term rhythm monitoring   - Follow up with Neurology in 6 months - 1 year as needed    Please call us with any questions or concerns, case was seen and discussed with Dr. Contreras    ATTENDING NOTE    Patient was seen and examined with Stroke Fellow . I agree with the note above except as noted.    Biopsy proven CAA.  Avoid anti-platelets for now unless other indication.  Severe HTN - re-check showed SBP in the 180's but patient states that he does run high and will work with PCP on that.    I would recommend a referral to Cardiology for consideration of ILR long term cardiac monitoring. We discussed zio results with patient.     Follow up WILSON Lujan MD  Vascular Neurology fellow  Pager # [phone]

## 2019-04-02 NOTE — LETTER
4/2/2019       RE: Boy Aguirre  [address]  [facility] [address]     Dear Colleague,    Thank you for referring your patient, Boy Aguirre, to the [facility] NEUROLOGY at [facility] acute hospital. Please see a copy of my visit note below.    [facility], [facility]    Vascular Neurology Consult    Name: Boy Aguirre  YOB: 1955  MRN: [id number]  Today's date: 4/2/2019  Source of information: Patient and chart review    Chief Complaint:  Neurology follow up    History of Present Illness:   Boy Aguirre is a 63 year old RH male from [place], [place] with a pmhx of  M to F transgender - currently not on any hormonal therapy, asthma as a child, history of CKD, right sided sciatica, poorly controlled HTN, borderline diabetic, Rt frontal aggressive cerebral amyloid angiopathy with vasogenic edema  in Nov 2017, multiple subcortical signal abn on MRI likely related to the cerebral amyloid angiopathy vs subcortical strokes presents to the Stroke Clinic for follow up. (Bx confirmed amyloid but without inflammatory component)    After diagnosis in November 2017, he was managed with blood pressure control and avoiding antiplatelet use. Had a follow-up MRI April 2018 which showed resolution of the Rt frontal edema, significant subcortical periventricular ischemic changes, there are areas of probably ew subacute stroke again occurring in the periventricular subcortical locations including left splenium. He have been clinically asymptomatic with normal neurological exam. Currently remains asymptomatic but his BP during this clinic visit was SBP> 200s and on a recheck was 160s.     The patient's medical, surgical, social, and family history were personally reviewed with the patient.  Past Medical History:   Diagnosis Date     Male-to-female transgender person    Other medical history as mentioned above    Past Surgical History:    Procedure Laterality Date     OPTICAL TRACKING SYSTEM BIOPSY BRAIN Right 11/19/2017    Procedure: OPTICAL TRACKING SYSTEM BIOPSY BRAIN;  Stealth Guided Right Frontal Craniotomy for Biopsy;  Surgeon: Alexandr Hauser MD;  Location: [facility] OR     TONSILLECTOMY      As a child     Social History     Tobacco Use     Smoking status: Former Smoker     Packs/day: 1.00     Years: 5.00     Pack years: 5.00     Types: Cigarettes     Smokeless tobacco: Former User     Quit date: 11/13/1976     Tobacco comment: Smoked from age [age], but not since   Substance Use Topics     Alcohol use: Yes     Comment: Very rare beer     Drug use: Yes     Comment: CBD oil daily     Family History   Problem Relation Age of Onset     Hypertension Mother      Alzheimer Disease Mother      Diabetes Other      Cancer No family hx of      Current Outpatient Medications   Medication     aMILoride (MIDAMOR) 5 MG tablet     atorvastatin (LIPITOR) 10 MG tablet     atorvastatin (LIPITOR) 10 MG tablet     carvedilol (COREG) 25 MG tablet     hydrochlorothiazide (MICROZIDE) 12.5 MG capsule     Niacin (VITAMIN B-3 OR)     No current facility-administered medications for this visit.      Allergies   Allergen Reactions     Insulin Swelling     Face and throat swelling     Review of Systems:  14-point review of systems was completed. The pertinent positives and negatives are in the HPI, and the remainder was negative unless otherwise mentioned.     Physical Exam:  /88 (BP Location: Right arm, Patient Position: Chair, Cuff Size: Adult Regular)   Pulse 68   SpO2 98%    General: Pleasant. Dressed appropriately for season.   Head: Normocephalic, atraumatic  Eyes: No conjunctival injection, no scleral icterus.   Mouth: No oral lesions, no erythema or exudate in the oropharynx  Respiratory: Non-labored breathing on room air. No accessory muscle use.  Cardiovascular: Peripheral pulses intact. No carotid bruits.  Abdomen: Soft  Extremities: Warm, dry  without peripheral edema  Neurologic:    Mental Status Exam: Alert, awake. Fully oriented. Provides a thorough history. Speech of normal fluency.  Cranial Nerves:  EOMs intact, no nystagmus, facial movements symmetric, facial sensation intact to light touch, hearing intact to conversation, palate and uvula rise symmetrically, no deviation in uvula or tongue, symmetric shoulder shrug, tongue midline and fully mobile.   Motor:  Normal tone in all four extremities, no atrophy or fasciculations were seen. 5/5 strength bilaterally in shoulder abduction, elbow extensors and flexors, wrist extension, finger abduction, hip flexors, knee extensors and flexors. No tremors.  Sensory: Sensation intact to light touch on arms and legs bilaterally. Negative Romberg.  Coordination: Finger-nose-finger without dysmetria bilaterally. Finger tapping regular and rhythmic bilaterally.   Reflexes: 2+ and symmetric in biceps, brachioradialis, patellar, Achilles, and plantars downgoing bilaterally.   Gait: Normal gait; normal arm swing and stance. Normal tandem.    Laboratory:  Lab Results   Component Value Date    WBC 10.8 08/03/2018    HGB 15.2 08/03/2018    HCT 45.3 08/03/2018     08/03/2018     08/03/2018    POTASSIUM 3.9 08/03/2018    CHLORIDE 102 08/03/2018    CO2 28 08/03/2018    BUN 16 08/03/2018    CR 1.98 (H) 08/03/2018     (H) 08/03/2018    SED 28 (H) 08/03/2018    NTBNPI 5,950 (H) 11/13/2017    AST 14 08/03/2018    ALT 16 08/03/2018    ALKPHOS 121 08/03/2018    BILITOTAL 0.7 08/03/2018    INR 1.00 11/18/2017   Reviewed imaging from prior hospitalizations.     Assessment/Recommendations:  Boy Aguirre is a 63 year old male with focal Rt frontal severe cerebral amyloid angiopathy (biopsy confirmed) s/p bleeding/edema 11/2017 + B/l associated subcortical strokes. F/U MRI April 2018 showed subacute subcortical strokes.      #Cerebral amyloid  - Avoid antiplatelets for now   - Consider long term rhythm monitoring  to evaluate for occult atrial fibrillation. Given amyloid findings, patient might not be a candidate for anticoagulation but can consider other treatment options including ablation/ NALINI closure   - However in future if patient develops ischemic stroke it would be possible to start patient on low dose of ASA 81mg or cilostazole 100mg bid (CSPS 2 trial 2010, noninferior than ASA in Asians and safer hemorrhagic risk than ASA). Risks of strokes and bleeding could be discussed then.    #Blood pressure  - Counseled regarding strict blood pressure control  - Long term BP goal < 130  -     #Follow up  - PCP in 1-2 weeks, consider referral to Cardiology for long term rhythm monitoring   - Follow up with Neurology in 6 months - 1 year as needed    Please call us with any questions or concerns, case was seen and discussed with Dr. Contreras    ATTENDING NOTE    Patient was seen and examined with Stroke Fellow . I agree with the note above except as noted.    Biopsy proven CAA.  Avoid anti-platelets for now unless other indication.  Severe HTN - re-check showed SBP in the 180's but patient states that he does run high and will work with PCP on that.    I would recommend a referral to Cardiology for consideration of ILR long term cardiac monitoring. We discussed zio results with patient.     Follow up WILSON Lujan MD  Vascular Neurology fellow  Pager # [phone]    Giacomo Contreras MD

## 2019-04-06 NOTE — PATIENT INSTRUCTIONS
1. Will work with PCP on BP control  2. Discussed zio results - will talk to PCP about cardiology referral for long-term monitoring  3. Follow up as needed  4. No aspirin or NSAIDS for now but will reconsider if needed for secondary prevention

## 2019-04-09 ENCOUNTER — TELEPHONE (OUTPATIENT)
Dept: NEUROLOGY | Facility: CLINIC | Age: 64
End: 2019-04-09

## 2019-04-09 NOTE — TELEPHONE ENCOUNTER
----- Message from Dami Mcgarry MA sent at 4/9/2019  9:04 AM CDT -----  Please and thank you    Wayne SOLIMAN    ----- Message -----  From: Ivanna Sheppard RN  Sent: 4/8/2019  11:07 AM  To: AVELINA Tiejrina,  Please fax the note to his primary care provider.  Thanks,  Robel    ----- Message -----  From: Giacomo Contreras MD  Sent: 4/6/2019  11:37 AM  To: Ivanna Sheppard RN, Dami Mcgarry MA    Hi - this gentleman is from [place] - can we get our note to the PCP somehow - thanks  KL

## 2019-04-10 ENCOUNTER — TELEPHONE (OUTPATIENT)
Dept: NEUROLOGY | Facility: CLINIC | Age: 64
End: 2019-04-10

## 2019-04-10 NOTE — TELEPHONE ENCOUNTER
[facility] Call Center    Phone Message    May a detailed message be left on voicemail: no    Reason for Call: Other: Ivan from [facility] called to clarify plan of care for this Pt after seeing Dr. Contreras on 4/2/19 for stroke consult. Pt has been telling them they need another zio patch ordered and placed an additional testing. [facility] notes from 4/2 contradict this. [facility] with [facility] from [facility] and didn't want to call themselves to clarify. Please call Ivan back to discuss.     Action Taken: Message routed to:  [facility] NE[facility]OLOGY [facility]ULT [facility]

## 2019-04-11 NOTE — TELEPHONE ENCOUNTER
Informed Ivan of plan to follow-up with primary care provider in 1-2 weeks and consider Cards referral for long term monitoring, and follow-up with neurology in 6-12 months as needed. Dr. Quiroga did not receive Dr. Contreras's visit note that was faxed on 4/9/19. Will refax to [phone]. Ivan will contact patient to make appointment with Dr. Quiroga.

## 2020-11-17 NOTE — TELEPHONE ENCOUNTER
APPT INFO    Date /Time: 1/9/18, 9:30am    Reason for Appt: Amyloid angiopathy    Ref Provider/Clinic: DENNISE MENON   Are there internal records? Yes/No?  IF YES, list clinic names: [facility], [facility]   Are there outside records? Yes/No? no   Patient Contact (Y/N) & Call Details: No, referred    Action:                 Hemigard Postcare Instructions: The HEMIGARD strips are to remain completely dry for at least 5-7 days.

## 2022-12-09 NOTE — PROGRESS NOTES
Brief Pulmonary Note    Reviewed PET/CT and discussed management decisions with Mr. Aguirre at bedside today.   - PET/CT did not show any discrete pulmonary nodules/lesions or LN that were PET avid. The chances that he would have a primary lung malignancy given normal CT chest and PET/CT is <5%.   - Would not recommend diagnostic thoracentesis or lung biopsies at this time.   - Please call with any additional q's or concerns.     Fernandez Herman MD  Interventional Pulmonary  Division of Pulmonary, Allergy, Critical Care and Sleep Medicine   [phone]      Opt out

## (undated) DEVICE — COVER CAMERA VIDEO 5X96" 29-59009

## (undated) DEVICE — SPONGE SURGIFOAM 12 1972

## (undated) DEVICE — PREP CHLORAPREP CLEAR 3ML 260400

## (undated) DEVICE — SPONGE COTTONOID 1/2X1/2" 20-04S

## (undated) DEVICE — DRSG PRIMAPORE 02X3" 7133

## (undated) DEVICE — BUR BALL 5MM CARBIDE ANSPACH S-5B-C-G1

## (undated) DEVICE — BLADE CLIPPER SGL USE 9680

## (undated) DEVICE — LINEN TOWEL PACK X6 WHITE 5487

## (undated) DEVICE — DECANTER VIAL 2006S

## (undated) DEVICE — PERFORATOR 14MM CODMAN

## (undated) DEVICE — PAD CHUX UNDERPAD 23X24" 7136

## (undated) DEVICE — SUCTION MANIFOLD DORNOCH ULTRA CART UL-CL500

## (undated) DEVICE — DRSG TELFA 3X8" 1238

## (undated) DEVICE — MARKER SPHERE PASSIVE 5 SPHERES/TRAY 8801002

## (undated) DEVICE — SOL RINGERS LACTATED 1000ML BAG 07953-09

## (undated) DEVICE — DECANTER BAG 2002S

## (undated) DEVICE — PREP POVIDONE IODINE SCRUB 7.5% 120ML

## (undated) DEVICE — SU VICRYL 2-0 CT-2 CR 8X18" J726D

## (undated) DEVICE — CRANIOTOME ADULT ANSPACH A-CRN

## (undated) DEVICE — PREP SKIN SCRUB TRAY 4461A

## (undated) DEVICE — PACK NEURO MINOR UMMC SNE32MNMU4

## (undated) DEVICE — DRAPE SHEET MED 44X70" 9355

## (undated) DEVICE — PREP POVIDONE IODINE SOLUTION 10% 120ML

## (undated) DEVICE — ESU GROUND PAD ADULT W/CORD E7507

## (undated) DEVICE — SYR 10ML LL W/O NDL 302995

## (undated) DEVICE — GLOVE PROTEXIS MICRO 7.5  2D73PM75

## (undated) DEVICE — LINEN TOWEL PACK X5 5464

## (undated) DEVICE — NDL BX BRAIN STEALTH KIT PASSIVE  9733068

## (undated) DEVICE — DRAPE CRANIOTOMY W/POUCH 9450

## (undated) DEVICE — SOL WATER IRRIG 1000ML BOTTLE 2F7114

## (undated) DEVICE — SU NUROLON 4-0 TF CR 8X18" C584D

## (undated) RX ORDER — SODIUM CHLORIDE 9 MG/ML
INJECTION, SOLUTION INTRAVENOUS
Status: DISPENSED
Start: 2017-11-19

## (undated) RX ORDER — FENTANYL CITRATE 50 UG/ML
INJECTION, SOLUTION INTRAMUSCULAR; INTRAVENOUS
Status: DISPENSED
Start: 2017-11-19

## (undated) RX ORDER — ONDANSETRON 2 MG/ML
INJECTION INTRAMUSCULAR; INTRAVENOUS
Status: DISPENSED
Start: 2017-11-19

## (undated) RX ORDER — DEXAMETHASONE SODIUM PHOSPHATE 4 MG/ML
INJECTION, SOLUTION INTRA-ARTICULAR; INTRALESIONAL; INTRAMUSCULAR; INTRAVENOUS; SOFT TISSUE
Status: DISPENSED
Start: 2017-11-19

## (undated) RX ORDER — HYDRALAZINE HYDROCHLORIDE 20 MG/ML
INJECTION INTRAMUSCULAR; INTRAVENOUS
Status: DISPENSED
Start: 2017-11-19

## (undated) RX ORDER — PROPOFOL 10 MG/ML
INJECTION, EMULSION INTRAVENOUS
Status: DISPENSED
Start: 2017-11-19

## (undated) RX ORDER — LABETALOL HYDROCHLORIDE 5 MG/ML
INJECTION, SOLUTION INTRAVENOUS
Status: DISPENSED
Start: 2017-11-19